# Patient Record
Sex: FEMALE | Race: WHITE | NOT HISPANIC OR LATINO | URBAN - METROPOLITAN AREA
[De-identification: names, ages, dates, MRNs, and addresses within clinical notes are randomized per-mention and may not be internally consistent; named-entity substitution may affect disease eponyms.]

---

## 2022-08-26 ENCOUNTER — EMERGENCY (EMERGENCY)
Facility: HOSPITAL | Age: 82
LOS: 1 days | Discharge: HOME | End: 2022-08-26
Attending: INTERNAL MEDICINE | Admitting: INTERNAL MEDICINE
Payer: MEDICARE

## 2022-08-26 ENCOUNTER — APPOINTMENT (OUTPATIENT)
Dept: INTERNAL MEDICINE | Facility: CLINIC | Age: 82
End: 2022-08-26

## 2022-08-26 VITALS
OXYGEN SATURATION: 100 % | HEART RATE: 89 BPM | DIASTOLIC BLOOD PRESSURE: 77 MMHG | WEIGHT: 110.89 LBS | HEIGHT: 62 IN | RESPIRATION RATE: 16 BRPM | SYSTOLIC BLOOD PRESSURE: 158 MMHG | TEMPERATURE: 98 F

## 2022-08-26 VITALS
OXYGEN SATURATION: 99 % | RESPIRATION RATE: 16 BRPM | BODY MASS INDEX: 21.99 KG/M2 | HEIGHT: 60 IN | DIASTOLIC BLOOD PRESSURE: 68 MMHG | WEIGHT: 112 LBS | TEMPERATURE: 98 F | HEART RATE: 88 BPM | SYSTOLIC BLOOD PRESSURE: 124 MMHG

## 2022-08-26 VITALS
RESPIRATION RATE: 18 BRPM | HEART RATE: 78 BPM | DIASTOLIC BLOOD PRESSURE: 73 MMHG | OXYGEN SATURATION: 97 % | SYSTOLIC BLOOD PRESSURE: 128 MMHG

## 2022-08-26 DIAGNOSIS — U07.1 COVID-19: ICD-10-CM

## 2022-08-26 DIAGNOSIS — M54.12 RADICULOPATHY, CERVICAL REGION: ICD-10-CM

## 2022-08-26 LAB
ALBUMIN SERPL ELPH-MCNC: 3.3 G/DL — SIGNIFICANT CHANGE UP (ref 3.3–5)
ALP SERPL-CCNC: 67 U/L — SIGNIFICANT CHANGE UP (ref 40–120)
ALT FLD-CCNC: 18 U/L — SIGNIFICANT CHANGE UP (ref 10–45)
ANION GAP SERPL CALC-SCNC: 10 MMOL/L — SIGNIFICANT CHANGE UP (ref 5–17)
AST SERPL-CCNC: 16 U/L — SIGNIFICANT CHANGE UP (ref 10–40)
BASOPHILS # BLD AUTO: 0.04 K/UL — SIGNIFICANT CHANGE UP (ref 0–0.2)
BASOPHILS NFR BLD AUTO: 0.8 % — SIGNIFICANT CHANGE UP (ref 0–2)
BILIRUB SERPL-MCNC: 0.2 MG/DL — SIGNIFICANT CHANGE UP (ref 0.2–1.2)
BUN SERPL-MCNC: 30 MG/DL — HIGH (ref 7–23)
CALCIUM SERPL-MCNC: 10.3 MG/DL — SIGNIFICANT CHANGE UP (ref 8.4–10.5)
CHLORIDE SERPL-SCNC: 107 MMOL/L — SIGNIFICANT CHANGE UP (ref 96–108)
CO2 SERPL-SCNC: 25 MMOL/L — SIGNIFICANT CHANGE UP (ref 22–31)
CREAT SERPL-MCNC: 0.9 MG/DL — SIGNIFICANT CHANGE UP (ref 0.5–1.3)
EGFR: 64 ML/MIN/1.73M2 — SIGNIFICANT CHANGE UP
EOSINOPHIL # BLD AUTO: 0.06 K/UL — SIGNIFICANT CHANGE UP (ref 0–0.5)
EOSINOPHIL NFR BLD AUTO: 1.2 % — SIGNIFICANT CHANGE UP (ref 0–6)
GLUCOSE SERPL-MCNC: 113 MG/DL — HIGH (ref 70–99)
HCT VFR BLD CALC: 33.1 % — LOW (ref 34.5–45)
HGB BLD-MCNC: 10.7 G/DL — LOW (ref 11.5–15.5)
IMM GRANULOCYTES NFR BLD AUTO: 0.2 % — SIGNIFICANT CHANGE UP (ref 0–1.5)
LIDOCAIN IGE QN: 91 U/L — SIGNIFICANT CHANGE UP (ref 73–393)
LYMPHOCYTES # BLD AUTO: 0.9 K/UL — LOW (ref 1–3.3)
LYMPHOCYTES # BLD AUTO: 18.7 % — SIGNIFICANT CHANGE UP (ref 13–44)
MCHC RBC-ENTMCNC: 29.9 PG — SIGNIFICANT CHANGE UP (ref 27–34)
MCHC RBC-ENTMCNC: 32.3 GM/DL — SIGNIFICANT CHANGE UP (ref 32–36)
MCV RBC AUTO: 92.5 FL — SIGNIFICANT CHANGE UP (ref 80–100)
MONOCYTES # BLD AUTO: 0.53 K/UL — SIGNIFICANT CHANGE UP (ref 0–0.9)
MONOCYTES NFR BLD AUTO: 11 % — SIGNIFICANT CHANGE UP (ref 2–14)
NEUTROPHILS # BLD AUTO: 3.27 K/UL — SIGNIFICANT CHANGE UP (ref 1.8–7.4)
NEUTROPHILS NFR BLD AUTO: 68.1 % — SIGNIFICANT CHANGE UP (ref 43–77)
NRBC # BLD: 0 /100 WBCS — SIGNIFICANT CHANGE UP (ref 0–0)
PLATELET # BLD AUTO: 250 K/UL — SIGNIFICANT CHANGE UP (ref 150–400)
POTASSIUM SERPL-MCNC: 3.9 MMOL/L — SIGNIFICANT CHANGE UP (ref 3.5–5.3)
POTASSIUM SERPL-SCNC: 3.9 MMOL/L — SIGNIFICANT CHANGE UP (ref 3.5–5.3)
PROT SERPL-MCNC: 7.7 G/DL — SIGNIFICANT CHANGE UP (ref 6–8.3)
RBC # BLD: 3.58 M/UL — LOW (ref 3.8–5.2)
RBC # FLD: 12.3 % — SIGNIFICANT CHANGE UP (ref 10.3–14.5)
SARS-COV-2 RNA SPEC QL NAA+PROBE: SIGNIFICANT CHANGE UP
SODIUM SERPL-SCNC: 142 MMOL/L — SIGNIFICANT CHANGE UP (ref 135–145)
TROPONIN I, HIGH SENSITIVITY RESULT: 11.5 NG/L — SIGNIFICANT CHANGE UP
TROPONIN I, HIGH SENSITIVITY RESULT: 12.4 NG/L — SIGNIFICANT CHANGE UP
WBC # BLD: 4.81 K/UL — SIGNIFICANT CHANGE UP (ref 3.8–10.5)
WBC # FLD AUTO: 4.81 K/UL — SIGNIFICANT CHANGE UP (ref 3.8–10.5)

## 2022-08-26 PROCEDURE — 83690 ASSAY OF LIPASE: CPT

## 2022-08-26 PROCEDURE — 76705 ECHO EXAM OF ABDOMEN: CPT

## 2022-08-26 PROCEDURE — 76705 ECHO EXAM OF ABDOMEN: CPT | Mod: 26

## 2022-08-26 PROCEDURE — 84484 ASSAY OF TROPONIN QUANT: CPT

## 2022-08-26 PROCEDURE — 74177 CT ABD & PELVIS W/CONTRAST: CPT | Mod: 26,MA

## 2022-08-26 PROCEDURE — 71045 X-RAY EXAM CHEST 1 VIEW: CPT | Mod: 26

## 2022-08-26 PROCEDURE — 99204 OFFICE O/P NEW MOD 45 MIN: CPT | Mod: 25

## 2022-08-26 PROCEDURE — 36415 COLL VENOUS BLD VENIPUNCTURE: CPT

## 2022-08-26 PROCEDURE — 93010 ELECTROCARDIOGRAM REPORT: CPT

## 2022-08-26 PROCEDURE — 74177 CT ABD & PELVIS W/CONTRAST: CPT | Mod: MA

## 2022-08-26 PROCEDURE — 85025 COMPLETE CBC W/AUTO DIFF WBC: CPT

## 2022-08-26 PROCEDURE — 93005 ELECTROCARDIOGRAM TRACING: CPT

## 2022-08-26 PROCEDURE — 96365 THER/PROPH/DIAG IV INF INIT: CPT | Mod: XU

## 2022-08-26 PROCEDURE — 80053 COMPREHEN METABOLIC PANEL: CPT

## 2022-08-26 PROCEDURE — 71045 X-RAY EXAM CHEST 1 VIEW: CPT

## 2022-08-26 PROCEDURE — 99285 EMERGENCY DEPT VISIT HI MDM: CPT | Mod: 25

## 2022-08-26 PROCEDURE — 96375 TX/PRO/DX INJ NEW DRUG ADDON: CPT

## 2022-08-26 PROCEDURE — 87635 SARS-COV-2 COVID-19 AMP PRB: CPT

## 2022-08-26 PROCEDURE — 99285 EMERGENCY DEPT VISIT HI MDM: CPT

## 2022-08-26 PROCEDURE — 96361 HYDRATE IV INFUSION ADD-ON: CPT

## 2022-08-26 RX ORDER — CIPROFLOXACIN LACTATE 400MG/40ML
400 VIAL (ML) INTRAVENOUS ONCE
Refills: 0 | Status: COMPLETED | OUTPATIENT
Start: 2022-08-26 | End: 2022-08-26

## 2022-08-26 RX ORDER — MORPHINE SULFATE 50 MG/1
4 CAPSULE, EXTENDED RELEASE ORAL ONCE
Refills: 0 | Status: DISCONTINUED | OUTPATIENT
Start: 2022-08-26 | End: 2022-08-26

## 2022-08-26 RX ORDER — SODIUM CHLORIDE 9 MG/ML
1000 INJECTION INTRAMUSCULAR; INTRAVENOUS; SUBCUTANEOUS ONCE
Refills: 0 | Status: COMPLETED | OUTPATIENT
Start: 2022-08-26 | End: 2022-08-26

## 2022-08-26 RX ORDER — ONDANSETRON 8 MG/1
4 TABLET, FILM COATED ORAL ONCE
Refills: 0 | Status: COMPLETED | OUTPATIENT
Start: 2022-08-26 | End: 2022-08-26

## 2022-08-26 RX ORDER — ONDANSETRON 8 MG/1
1 TABLET, FILM COATED ORAL
Qty: 21 | Refills: 0
Start: 2022-08-26 | End: 2022-09-01

## 2022-08-26 RX ORDER — METRONIDAZOLE 500 MG
500 TABLET ORAL ONCE
Refills: 0 | Status: DISCONTINUED | OUTPATIENT
Start: 2022-08-26 | End: 2022-08-26

## 2022-08-26 RX ADMIN — SODIUM CHLORIDE 1000 MILLILITER(S): 9 INJECTION INTRAMUSCULAR; INTRAVENOUS; SUBCUTANEOUS at 17:35

## 2022-08-26 RX ADMIN — Medication 400 MILLIGRAM(S): at 20:17

## 2022-08-26 RX ADMIN — Medication 200 MILLIGRAM(S): at 19:17

## 2022-08-26 RX ADMIN — SODIUM CHLORIDE 1000 MILLILITER(S): 9 INJECTION INTRAMUSCULAR; INTRAVENOUS; SUBCUTANEOUS at 16:35

## 2022-08-26 RX ADMIN — ONDANSETRON 4 MILLIGRAM(S): 8 TABLET, FILM COATED ORAL at 16:35

## 2022-08-26 NOTE — ED PROVIDER NOTE - PROGRESS NOTE DETAILS
case d/w dr martinez recommend no emergent surgical issue , dr camp informed ct ordered to further evaluate JESSICA Tran: Pt s/o to me to f/u abd CT results-- results d/w radiologist, suspicious for lung cancer with mets. I spoke with PCP Dr. Lynch regarding the results- he will f/u with patient in 1 week and refer her to the appropriate specialist. Pt and her  were informed of the results and the importance to f/u with Dr. Lynch for an outpatient w/u for malignancy. They verbalized understanding. No indication for abx

## 2022-08-26 NOTE — ED ADULT NURSE NOTE - OBJECTIVE STATEMENT
Pt came from home with c/o RUQ pain for a few weeks. Pt states that the RUQ pain is 3/10 and is associated with nausea. Pt states that she was sent in by MD Samaniego for a CT abdomen. Pt with hx HTN and HLD. Pt denies any fevers, chills, sob or chest pain at this time. Pt came from home with c/o RUQ pain for a few weeks. Pt states that the RUQ pain is 6/10 and is associated with nausea. Pt states that she has tried taking carafate, protonix and pepcid with no relief. Pt states that she was sent in by MD Samaniego for a CT abdomen. Pt with hx HTN and HLD. Pt denies any fevers, chills, sob or chest pain at this time.

## 2022-08-26 NOTE — ED ADULT TRIAGE NOTE - WEIGHT IN LBS
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 endometrium appears markedly thickened\par 4) EMB 8/28/2019\par    a) Rare foci of complex atypical hyperplasia in a background of exogenous hormone-related change.\par 5) EMB 11/22/2019 \par    a) Endometrium with foci of complex atypical hyperplasia in a background of exogenous hormone related changes. \par 6) EMB 2/27/2020\par     a) CAH\par 7) Pelvic US 7/7/20\par     a) uterus 8.4cm\par     b) endometrium 0.8cm , prominent echogenicity along its superior extent\par     c) IUD low in position \par \par \par patient is no longer bleeding.  feels fine.  Has been seeing an endocrinologist   She has gained a lot of weight.  She is taking megace.   110.8

## 2022-08-26 NOTE — ED PROVIDER NOTE - PATIENT PORTAL LINK FT
You can access the FollowMyHealth Patient Portal offered by [facility] by registering at the following website: [email or web address]. By joining [employer]’s FollowMyHealth portal, you will also be able to view your health information using other applications (apps) compatible with our system.

## 2022-08-26 NOTE — ED PROVIDER NOTE - CARE PROVIDERS DIRECT ADDRESSES
[email or web address]

## 2022-08-26 NOTE — ED PROVIDER NOTE - SKIN NEGATIVE STATEMENT, MLM
Afib    Anemia    CKD (chronic kidney disease)    Dementia    Depression    DM (diabetes mellitus), secondary    Dyslipidemia    HTN (hypertension) no abrasions, no jaundice, no lesions, no pruritis, and no rashes.

## 2022-08-26 NOTE — ED PROVIDER NOTE - CARE PROVIDER_API CALL
Chuck Samaniego  GASTROENTEROLOGY  [address] [place] [address], [address]  [place], [place] [id number]  Phone: [phone]  Fax: [phone]  Follow Up Time:

## 2022-08-26 NOTE — ED PROVIDER NOTE - ATTENDING APP SHARED VISIT CONTRIBUTION OF CARE
80 y/o F with h/o RA, HTN, HLD pw waxing and waning RUQ abdominal pain for a few weeks associated with nausea. Denies fever, chills, headache, chest pain, shortness of breath, vomiting, diarrhea, dysuria, weakness, numbness, tingling. Currently on carafate, pepcid and protonix without relief.   PMD- Danette  NO smoking, no ETOH  Plan: Will check basic labs with lipase, RUQ US, UA, UCX, COVID, give fluids/zofran/pepcid and reassess  Dr. Cao:  I have reviewed and discussed with the PA/ resident the case specifics, including the history, physical assessment, evaluation, conclusion, laboratory results, and medical plan. I agree with the contents, and conclusions. I have personally examined, and interviewed the patient. 82 y/o F with h/o RA, HTN, HLD pw waxing and waning RUQ abdominal pain for a few weeks associated with nausea. Denies fever, chills, headache, chest pain, shortness of breath, vomiting, diarrhea, dysuria, weakness, numbness, tingling. Currently on carafate, pepcid and protonix without relief.   PMD- Danette  NO smoking, no ETOH  Plan: Will check basic labs with lipase, RUQ US, UA, UCX, COVID, give fluids/zofran/pepcid and reassess  case was s/o dr prabhakar follow up ct , reeval and dispo  Dr. Cao:  I have reviewed and discussed with the PA/ resident the case specifics, including the history, physical assessment, evaluation, conclusion, laboratory results, and medical plan. I agree with the contents, and conclusions. I have personally examined, and interviewed the patient.

## 2022-08-26 NOTE — ED PROVIDER NOTE - CLINICAL SUMMARY MEDICAL DECISION MAKING FREE TEXT BOX
80 y/o F with h/o RA, HTN, HLD pw waxing and waning RUQ abdominal pain for a few weeks associated with nausea. Denies fever, chills, headache, chest pain, shortness of breath, vomiting, diarrhea, dysuria, weakness, numbness, tingling. Currently on carafate, pepcid and protonix without relief.   PMD- Danette  NO smoking, no ETOH  Plan: Will check basic labs with lipase, RUQ US, UA, UCX, COVID, give fluids/zofran/pepcid and reassess

## 2022-08-26 NOTE — ED PROVIDER NOTE - NSFOLLOWUPINSTRUCTIONS_ED_ALL_ED_FT
Follow up with your primary care physician Dr. Lynch within 1 week as discussed for the abnormal abdominal catscan. Take the copy of your test results you were given in the emergency room for your doctor to review.     Take the prescribed medication as directed for nausea     Stay hydrated    Return to the ER if your symptoms worsen or for any other medical emergencies  ***********

## 2022-08-26 NOTE — ED PROVIDER NOTE - OBJECTIVE STATEMENT
Pantoprazole 40mg PO daily     80 y/o F with h/o RA, HTN, HLD pw waxing and waning RUQ abdominal pain for a few weeks associated with nausea. Denies fever, chills, headache, chest pain, shortness of breath, vomiting, diarrhea, dysuria, weakness, numbness, tingling. Currently on carafate, pepcid and protonix without relief.   PMD- Danette  NO smoking, no ETOH

## 2022-08-26 NOTE — ED PROVIDER NOTE - NS ED ATTENDING STATEMENT MOD
This was a shared visit with the EDILMA. I reviewed and verified the documentation and independently performed the documented:

## 2022-08-29 ENCOUNTER — NON-APPOINTMENT (OUTPATIENT)
Age: 82
End: 2022-08-29

## 2022-08-30 ENCOUNTER — APPOINTMENT (OUTPATIENT)
Dept: CT IMAGING | Facility: IMAGING CENTER | Age: 82
End: 2022-08-30

## 2022-08-30 ENCOUNTER — RESULT REVIEW (OUTPATIENT)
Age: 82
End: 2022-08-30

## 2022-08-30 ENCOUNTER — OUTPATIENT (OUTPATIENT)
Dept: OUTPATIENT SERVICES | Facility: HOSPITAL | Age: 82
LOS: 1 days | End: 2022-08-30
Payer: MEDICARE

## 2022-08-30 ENCOUNTER — INPATIENT (INPATIENT)
Facility: HOSPITAL | Age: 82
LOS: 2 days | Discharge: HOME | End: 2022-09-02
Attending: INTERNAL MEDICINE | Admitting: INTERNAL MEDICINE
Payer: MEDICARE

## 2022-08-30 ENCOUNTER — APPOINTMENT (OUTPATIENT)
Dept: PULMONOLOGY | Facility: CLINIC | Age: 82
End: 2022-08-30

## 2022-08-30 VITALS
HEART RATE: 80 BPM | HEIGHT: 62 IN | OXYGEN SATURATION: 100 % | DIASTOLIC BLOOD PRESSURE: 67 MMHG | TEMPERATURE: 98 F | SYSTOLIC BLOOD PRESSURE: 137 MMHG | RESPIRATION RATE: 18 BRPM

## 2022-08-30 DIAGNOSIS — J18.1 LOBAR PNEUMONIA, UNSPECIFIED ORGANISM: ICD-10-CM

## 2022-08-30 DIAGNOSIS — Z01.812 ENCOUNTER FOR PREPROCEDURAL LABORATORY EXAMINATION: ICD-10-CM

## 2022-08-30 DIAGNOSIS — Z20.822 ENCOUNTER FOR PREPROCEDURAL LABORATORY EXAMINATION: ICD-10-CM

## 2022-08-30 PROCEDURE — 82565 ASSAY OF CREATININE: CPT

## 2022-08-30 PROCEDURE — 71275 CT ANGIOGRAPHY CHEST: CPT

## 2022-08-30 PROCEDURE — 87811 SARS-COV-2 COVID19 W/OPTIC: CPT

## 2022-08-30 PROCEDURE — 99285 EMERGENCY DEPT VISIT HI MDM: CPT

## 2022-08-30 PROCEDURE — 71275 CT ANGIOGRAPHY CHEST: CPT | Mod: 26

## 2022-08-30 NOTE — ED PROVIDER NOTE - ATTENDING CONTRIBUTION TO CARE
Patient is an 80 yo F with history of RA, HTN, high cholesterol sent in by Dr. Chiu for admission for outpatient CT that showed acute segmental PE in [place] and findings concerning for a neoplastic process with left lower lobe consolidation multistation thoracic lymphadenopathy, moderate left and small right pleural effusions with left pleural nodularity and question of L2 pathologic fracture. Patient was sent in with instructions to start heparin and admit to tele. IR was consulted by pcp and already arranged for a procedure in 2 days. Patient reports 3 weeks of left upper abdominal discomfort that led to outpatient imaging. She reports she had COVID in May. No fevers, chills, nausea, vomiting or chest pain.     VS noted  Gen. no acute distress, Non toxic   HEENT: EOMI, mmm  Lungs: CTAB/L no C/ W /R   CVS: RRR   Abd; Soft non tender, non distended   Ext: b/l pedal edema, no calf tenderness  Skin: no rash  Neuro AAOx3 non focal clear speech  a/p: acute PE and CT findings as described above, concerning for neoplastic process with associated pleural effusions. Plan for labs and admission. Will start heparin. Patient has CT imaging visible in PACS.   - Nieves CHRIS

## 2022-08-30 NOTE — ED ADULT NURSE NOTE - OBJECTIVE STATEMENT
Patient received to room trb. Patient complaining of ct scan showing PE and stating that patient needs to be admitted. . Patient denies chest pain sob, n/v Patient on exam is on the monitor. IVg placed awaitng for orders.

## 2022-08-30 NOTE — ED PROVIDER NOTE - OBJECTIVE STATEMENT
82yo F w/ history of RA, htn and HLD coming in for outpatient CT that showed acute segmental PE in [place] and findings concerning for a neoplastic process by Dr. Chiu. Patient initially reports 3 weeks of left upper abdominal pain x 3 weeks, which prompted the scan. Patient was + for COVID in may. Otherwise denies chest pain, nausea, vomiting or fevers.

## 2022-08-30 NOTE — ED ADULT TRIAGE NOTE - CHIEF COMPLAINT QUOTE
sent here by pulmonologist for "left sided pulmonary embolism." Denies CP, SOB, Dizziness Hx HTN, RA

## 2022-08-30 NOTE — CHART NOTE - NSCHARTNOTEFT_GEN_A_CORE
IP Note    80 y/o F with dyspnea, with findings of new left sided pleural effusion with pleural nodularity, thoracic adenopathy, and possible extrathoracic metastatic disease with CTA also demonstrating new PE and pericardial effusion. Patient admitted to hospital for diagnostic and therapeutic intervention. Case discussed with referring pulmonologist.     Plan for OR on Thursday pm with Flexible Bronchoscopy, EBUS and possible left thoracentesis.     Recommend -   - Admit to tele for new PE and pericardial effusion  - Start heparin infusion for PE  - Obtain TTE/2D echo to assess pericardial effusion prior to OR and obtain cardiology eval and clearance for general anesthesia for the procedure.   - NPO after midnight wednesday night.  - Page on call pulmonary fellow with questions.     Full consult in am.     Axel Dos Santos MD  Interventional Pulmonology

## 2022-08-30 NOTE — CHART NOTE - NSCHARTNOTEFT_GEN_A_CORE
SW placed call to patient to discuss and assist with follow up care.  Patient presented to ED on 8/26/22.  Patient did not answer - right to VM. As per HIE, patient is scheduled to complete CTA on 8/30/22 as ordered by PMD.

## 2022-08-30 NOTE — ED PROVIDER NOTE - NS ED ROS FT
General: denies fever, chills  HENT: denies nasal congestion, rhinorrhea  Eyes: denies visual changes, blurred vision  CV: denies chest pain, palpitations  Resp: denies difficulty breathing, cough  Abdominal: left upper abdominal pain  : denies urinary pain or discharge  MSK: denies muscle aches, leg swelling  Neuro: denies headaches, numbness, tingling  Skin: denies rashes, bruises

## 2022-08-30 NOTE — ED PROVIDER NOTE - PHYSICAL EXAMINATION
GENERAL: well appearing in no acute distress, non-toxic appearing  HEAD: normocephalic, atraumatic  HENT: airway intact, neck supple  EYES: normal conjunctiva, EOMI, PERRL  CARDIAC: regular rate and rhythm, normal S1S2, no appreciable murmurs, 2+ pulses in UE/LE b/l  PULM: normal breath sounds, clear to ascultation bilaterally, no rales, rhonchi, wheezing  GI: abdomen nondistended, soft, nontender, no guarding, rebound tenderness  NEURO: no focal motor or sensory deficits  MSK: no peripheral edema, no calf tenderness b/l  SKIN: well-perfused, extremities warm, no visible rashes

## 2022-08-30 NOTE — ED PROVIDER NOTE - CLINICAL SUMMARY MEDICAL DECISION MAKING FREE TEXT BOX
82yo F w/ history of RA, htn and HLD coming in for outpatient CT that showed acute segmental PE w/ concerns for neoplastic process. Will begin heparin and admit for further eval

## 2022-08-31 ENCOUNTER — TRANSCRIPTION ENCOUNTER (OUTPATIENT)
Age: 82
End: 2022-08-31

## 2022-08-31 DIAGNOSIS — E78.5 HYPERLIPIDEMIA, UNSPECIFIED: ICD-10-CM

## 2022-08-31 DIAGNOSIS — Z90.89 ACQUIRED ABSENCE OF OTHER ORGANS: Chronic | ICD-10-CM

## 2022-08-31 DIAGNOSIS — Z29.9 ENCOUNTER FOR PROPHYLACTIC MEASURES, UNSPECIFIED: ICD-10-CM

## 2022-08-31 DIAGNOSIS — I10 ESSENTIAL (PRIMARY) HYPERTENSION: ICD-10-CM

## 2022-08-31 DIAGNOSIS — M06.9 RHEUMATOID ARTHRITIS, UNSPECIFIED: ICD-10-CM

## 2022-08-31 DIAGNOSIS — I26.99 OTHER PULMONARY EMBOLISM WITHOUT ACUTE COR PULMONALE: ICD-10-CM

## 2022-08-31 DIAGNOSIS — Z91.89 OTHER SPECIFIED PERSONAL RISK FACTORS, NOT ELSEWHERE CLASSIFIED: ICD-10-CM

## 2022-08-31 LAB
ALBUMIN SERPL ELPH-MCNC: 3.9 G/DL — SIGNIFICANT CHANGE UP (ref 3.3–5)
ALP SERPL-CCNC: 67 U/L — SIGNIFICANT CHANGE UP (ref 40–120)
ALT FLD-CCNC: 13 U/L — SIGNIFICANT CHANGE UP (ref 4–33)
ANION GAP SERPL CALC-SCNC: 12 MMOL/L — SIGNIFICANT CHANGE UP (ref 7–14)
APTT BLD: 24 SEC — LOW (ref 27–36.3)
APTT BLD: 52 SEC — HIGH (ref 27–36.3)
APTT BLD: 71.4 SEC — HIGH (ref 27–36.3)
APTT BLD: 75.1 SEC — HIGH (ref 27–36.3)
AST SERPL-CCNC: 19 U/L — SIGNIFICANT CHANGE UP (ref 4–32)
BASOPHILS # BLD AUTO: 0.03 K/UL — SIGNIFICANT CHANGE UP (ref 0–0.2)
BASOPHILS NFR BLD AUTO: 0.7 % — SIGNIFICANT CHANGE UP (ref 0–2)
BILIRUB SERPL-MCNC: 0.2 MG/DL — SIGNIFICANT CHANGE UP (ref 0.2–1.2)
BLD GP AB SCN SERPL QL: NEGATIVE — SIGNIFICANT CHANGE UP
BUN SERPL-MCNC: 25 MG/DL — HIGH (ref 7–23)
CALCIUM SERPL-MCNC: 9.3 MG/DL — SIGNIFICANT CHANGE UP (ref 8.4–10.5)
CHLORIDE SERPL-SCNC: 102 MMOL/L — SIGNIFICANT CHANGE UP (ref 98–107)
CO2 SERPL-SCNC: 23 MMOL/L — SIGNIFICANT CHANGE UP (ref 22–31)
CREAT SERPL-MCNC: 0.95 MG/DL — SIGNIFICANT CHANGE UP (ref 0.5–1.3)
EGFR: 60 ML/MIN/1.73M2 — SIGNIFICANT CHANGE UP
EOSINOPHIL # BLD AUTO: 0.09 K/UL — SIGNIFICANT CHANGE UP (ref 0–0.5)
EOSINOPHIL NFR BLD AUTO: 2 % — SIGNIFICANT CHANGE UP (ref 0–6)
GLUCOSE SERPL-MCNC: 90 MG/DL — SIGNIFICANT CHANGE UP (ref 70–99)
HCT VFR BLD CALC: 33 % — LOW (ref 34.5–45)
HCT VFR BLD CALC: 35.3 % — SIGNIFICANT CHANGE UP (ref 34.5–45)
HGB BLD-MCNC: 10.4 G/DL — LOW (ref 11.5–15.5)
HGB BLD-MCNC: 11.1 G/DL — LOW (ref 11.5–15.5)
IANC: 2.95 K/UL — SIGNIFICANT CHANGE UP (ref 1.8–7.4)
IMM GRANULOCYTES NFR BLD AUTO: 0.2 % — SIGNIFICANT CHANGE UP (ref 0–1.5)
INR BLD: 1.04 RATIO — SIGNIFICANT CHANGE UP (ref 0.88–1.16)
LYMPHOCYTES # BLD AUTO: 1.04 K/UL — SIGNIFICANT CHANGE UP (ref 1–3.3)
LYMPHOCYTES # BLD AUTO: 22.6 % — SIGNIFICANT CHANGE UP (ref 13–44)
MAGNESIUM SERPL-MCNC: 2.1 MG/DL — SIGNIFICANT CHANGE UP (ref 1.6–2.6)
MCHC RBC-ENTMCNC: 29.6 PG — SIGNIFICANT CHANGE UP (ref 27–34)
MCHC RBC-ENTMCNC: 30 PG — SIGNIFICANT CHANGE UP (ref 27–34)
MCHC RBC-ENTMCNC: 31.4 GM/DL — LOW (ref 32–36)
MCHC RBC-ENTMCNC: 31.5 GM/DL — LOW (ref 32–36)
MCV RBC AUTO: 94.1 FL — SIGNIFICANT CHANGE UP (ref 80–100)
MCV RBC AUTO: 95.1 FL — SIGNIFICANT CHANGE UP (ref 80–100)
MONOCYTES # BLD AUTO: 0.49 K/UL — SIGNIFICANT CHANGE UP (ref 0–0.9)
MONOCYTES NFR BLD AUTO: 10.6 % — SIGNIFICANT CHANGE UP (ref 2–14)
NEUTROPHILS # BLD AUTO: 2.95 K/UL — SIGNIFICANT CHANGE UP (ref 1.8–7.4)
NEUTROPHILS NFR BLD AUTO: 63.9 % — SIGNIFICANT CHANGE UP (ref 43–77)
NRBC # BLD: 0 /100 WBCS — SIGNIFICANT CHANGE UP (ref 0–0)
NRBC # BLD: 0 /100 WBCS — SIGNIFICANT CHANGE UP (ref 0–0)
NRBC # FLD: 0 K/UL — SIGNIFICANT CHANGE UP (ref 0–0)
NRBC # FLD: 0.03 K/UL — HIGH (ref 0–0)
NT-PROBNP SERPL-SCNC: 478 PG/ML — HIGH
PLATELET # BLD AUTO: 212 K/UL — SIGNIFICANT CHANGE UP (ref 150–400)
PLATELET # BLD AUTO: 275 K/UL — SIGNIFICANT CHANGE UP (ref 150–400)
POTASSIUM SERPL-MCNC: 4 MMOL/L — SIGNIFICANT CHANGE UP (ref 3.5–5.3)
POTASSIUM SERPL-SCNC: 4 MMOL/L — SIGNIFICANT CHANGE UP (ref 3.5–5.3)
PROT SERPL-MCNC: 7.5 G/DL — SIGNIFICANT CHANGE UP (ref 6–8.3)
PROTHROM AB SERPL-ACNC: 12.1 SEC — SIGNIFICANT CHANGE UP (ref 10.5–13.4)
RBC # BLD: 3.47 M/UL — LOW (ref 3.8–5.2)
RBC # BLD: 3.75 M/UL — LOW (ref 3.8–5.2)
RBC # FLD: 12.3 % — SIGNIFICANT CHANGE UP (ref 10.3–14.5)
RBC # FLD: 12.4 % — SIGNIFICANT CHANGE UP (ref 10.3–14.5)
RH IG SCN BLD-IMP: POSITIVE — SIGNIFICANT CHANGE UP
SARS-COV-2 N GENE NPH QL NAA+PROBE: NOT DETECTED
SARS-COV-2 RNA SPEC QL NAA+PROBE: SIGNIFICANT CHANGE UP
SODIUM SERPL-SCNC: 137 MMOL/L — SIGNIFICANT CHANGE UP (ref 135–145)
TROPONIN T, HIGH SENSITIVITY RESULT: 24 NG/L — SIGNIFICANT CHANGE UP
WBC # BLD: 4.37 K/UL — SIGNIFICANT CHANGE UP (ref 3.8–10.5)
WBC # BLD: 4.61 K/UL — SIGNIFICANT CHANGE UP (ref 3.8–10.5)
WBC # FLD AUTO: 4.37 K/UL — SIGNIFICANT CHANGE UP (ref 3.8–10.5)
WBC # FLD AUTO: 4.61 K/UL — SIGNIFICANT CHANGE UP (ref 3.8–10.5)

## 2022-08-31 PROCEDURE — 99222 1ST HOSP IP/OBS MODERATE 55: CPT

## 2022-08-31 PROCEDURE — 99223 1ST HOSP IP/OBS HIGH 75: CPT

## 2022-08-31 PROCEDURE — 93306 TTE W/DOPPLER COMPLETE: CPT | Mod: 26

## 2022-08-31 PROCEDURE — 99233 SBSQ HOSP IP/OBS HIGH 50: CPT

## 2022-08-31 PROCEDURE — 99223 1ST HOSP IP/OBS HIGH 75: CPT | Mod: GC

## 2022-08-31 RX ORDER — HEPARIN SODIUM 5000 [USP'U]/ML
2000 INJECTION INTRAVENOUS; SUBCUTANEOUS EVERY 6 HOURS
Refills: 0 | Status: DISCONTINUED | OUTPATIENT
Start: 2022-08-31 | End: 2022-09-01

## 2022-08-31 RX ORDER — LANOLIN ALCOHOL/MO/W.PET/CERES
3 CREAM (GRAM) TOPICAL AT BEDTIME
Refills: 0 | Status: DISCONTINUED | OUTPATIENT
Start: 2022-08-31 | End: 2022-09-02

## 2022-08-31 RX ORDER — FOLIC ACID 0.8 MG
1 TABLET ORAL DAILY
Refills: 0 | Status: DISCONTINUED | OUTPATIENT
Start: 2022-08-31 | End: 2022-09-02

## 2022-08-31 RX ORDER — ONDANSETRON 8 MG/1
4 TABLET, FILM COATED ORAL ONCE
Refills: 0 | Status: COMPLETED | OUTPATIENT
Start: 2022-08-31 | End: 2022-08-31

## 2022-08-31 RX ORDER — HYDROXYCHLOROQUINE SULFATE 200 MG
200 TABLET ORAL DAILY
Refills: 0 | Status: DISCONTINUED | OUTPATIENT
Start: 2022-08-31 | End: 2022-09-02

## 2022-08-31 RX ORDER — HEPARIN SODIUM 5000 [USP'U]/ML
2000 INJECTION INTRAVENOUS; SUBCUTANEOUS EVERY 6 HOURS
Refills: 0 | Status: DISCONTINUED | OUTPATIENT
Start: 2022-08-31 | End: 2022-08-31

## 2022-08-31 RX ORDER — HEPARIN SODIUM 5000 [USP'U]/ML
INJECTION INTRAVENOUS; SUBCUTANEOUS
Qty: 25000 | Refills: 0 | Status: DISCONTINUED | OUTPATIENT
Start: 2022-08-31 | End: 2022-08-31

## 2022-08-31 RX ORDER — AMLODIPINE BESYLATE 2.5 MG/1
2.5 TABLET ORAL DAILY
Refills: 0 | Status: DISCONTINUED | OUTPATIENT
Start: 2022-08-31 | End: 2022-09-02

## 2022-08-31 RX ORDER — ACETAMINOPHEN 500 MG
650 TABLET ORAL EVERY 6 HOURS
Refills: 0 | Status: DISCONTINUED | OUTPATIENT
Start: 2022-08-31 | End: 2022-09-02

## 2022-08-31 RX ORDER — HEPARIN SODIUM 5000 [USP'U]/ML
1100 INJECTION INTRAVENOUS; SUBCUTANEOUS
Qty: 25000 | Refills: 0 | Status: DISCONTINUED | OUTPATIENT
Start: 2022-08-31 | End: 2022-09-01

## 2022-08-31 RX ORDER — HEPARIN SODIUM 5000 [USP'U]/ML
4000 INJECTION INTRAVENOUS; SUBCUTANEOUS EVERY 6 HOURS
Refills: 0 | Status: DISCONTINUED | OUTPATIENT
Start: 2022-08-31 | End: 2022-09-01

## 2022-08-31 RX ORDER — HEPARIN SODIUM 5000 [USP'U]/ML
4000 INJECTION INTRAVENOUS; SUBCUTANEOUS ONCE
Refills: 0 | Status: COMPLETED | OUTPATIENT
Start: 2022-08-31 | End: 2022-08-31

## 2022-08-31 RX ORDER — HEPARIN SODIUM 5000 [USP'U]/ML
4000 INJECTION INTRAVENOUS; SUBCUTANEOUS EVERY 6 HOURS
Refills: 0 | Status: DISCONTINUED | OUTPATIENT
Start: 2022-08-31 | End: 2022-08-31

## 2022-08-31 RX ORDER — SIMVASTATIN 20 MG/1
40 TABLET, FILM COATED ORAL AT BEDTIME
Refills: 0 | Status: DISCONTINUED | OUTPATIENT
Start: 2022-08-31 | End: 2022-09-02

## 2022-08-31 RX ADMIN — Medication 200 MILLIGRAM(S): at 11:55

## 2022-08-31 RX ADMIN — Medication 650 MILLIGRAM(S): at 18:57

## 2022-08-31 RX ADMIN — HEPARIN SODIUM 1100 UNIT(S)/HR: 5000 INJECTION INTRAVENOUS; SUBCUTANEOUS at 07:31

## 2022-08-31 RX ADMIN — Medication 650 MILLIGRAM(S): at 17:42

## 2022-08-31 RX ADMIN — HEPARIN SODIUM 1000 UNIT(S)/HR: 5000 INJECTION INTRAVENOUS; SUBCUTANEOUS at 00:48

## 2022-08-31 RX ADMIN — ONDANSETRON 4 MILLIGRAM(S): 8 TABLET, FILM COATED ORAL at 15:43

## 2022-08-31 RX ADMIN — HEPARIN SODIUM 2000 UNIT(S): 5000 INJECTION INTRAVENOUS; SUBCUTANEOUS at 07:31

## 2022-08-31 RX ADMIN — Medication 1 MILLIGRAM(S): at 11:56

## 2022-08-31 RX ADMIN — HEPARIN SODIUM 1100 UNIT(S)/HR: 5000 INJECTION INTRAVENOUS; SUBCUTANEOUS at 20:19

## 2022-08-31 RX ADMIN — HEPARIN SODIUM 1100 UNIT(S)/HR: 5000 INJECTION INTRAVENOUS; SUBCUTANEOUS at 22:22

## 2022-08-31 RX ADMIN — HEPARIN SODIUM 1100 UNIT(S)/HR: 5000 INJECTION INTRAVENOUS; SUBCUTANEOUS at 19:26

## 2022-08-31 RX ADMIN — AMLODIPINE BESYLATE 2.5 MILLIGRAM(S): 2.5 TABLET ORAL at 11:55

## 2022-08-31 RX ADMIN — HEPARIN SODIUM 4000 UNIT(S): 5000 INJECTION INTRAVENOUS; SUBCUTANEOUS at 00:48

## 2022-08-31 NOTE — H&P ADULT - GASTROINTESTINAL
details… normal/soft/nontender/nondistended/normal active bowel sounds/no guarding/no rigidity/no organomegaly/no palpable moisés/no masses palpable

## 2022-08-31 NOTE — H&P ADULT - NS ATTEND AMEND GEN_ALL_CORE FT
80yo F w/ history of RA, HTN, and HLD presenting after OP CT showed acute segmental PE in [place] with findings concerning for malignancy. Patient is a bit anxious due to findings, but doing okay.     #Acute segmental/subsegmental [place] PE  - HD stable, resting comfortably off oxygen   - hep ggt, monitor PTTs   - BNP mildly elevated to the 400s, trop negative   - echo ordered to assess for right heart strain   - hold ggt 6 am on 9/1 for procedure     #c/f malignancy   - pulm consult appreciated   - cardiology consult for risk strat prior to EBUS bronchoscopy, thoracentesis 09/01     #HTN / HLD  - c/w home amlodipine, Zocor      #RA   - continue Plaquenil     Moreno Thompson MD  [facility] Division of Hospital Medicine  Pager: [phone]  Available via MS Teams

## 2022-08-31 NOTE — H&P ADULT - RESPIRATORY
normal/clear to auscultation bilaterally/no wheezes/no rales/no rhonchi/no respiratory distress/no use of accessory muscles/no subcutaneous emphysema

## 2022-08-31 NOTE — ED ADULT NURSE REASSESSMENT NOTE - NS ED NURSE REASSESS COMMENT FT1
Aptt result noted to be 71.4 .. no change in titration needed per heparin nomogram full anticoagulation protocol. Heparin gtt remains at 11ml/hr. Rpt aptt @1900
Received report from ENEDINA Acosta. Pt presents AAOx4 resting in bed in non acute distress. Heparin gtt in progress @11ml/hr verified with ENEDINA Acosta. Rpt aptt sent @1314. Pt provided with food.  at bedside. Pt denies pain/discomfort. Respirations even and unlabored sating 100% on room air. NSR on cardiac monitor. Cardiology at bedside for eval. Admitted to tele awaiting bed assignment
pt alert and oriented. pt denies complaints at this time. pt has no signs of bleeding noticed. Heparin drip was titrated up to 11ml/hr as per full AC nomogram. Will continue to monitor.
pt heparin infusion began. pt explained to notify RN if noticed any signs of bleeding. pt denies complaints at this time. Family at bedside. Will continue to monitor.
pt received A&ox4, awake and alert, NSR on telemetry. pt denies chest pain and SOB. heparin gtt running at 11ml/hr. no signs of bleeding. bleeding precautions in place. breathing is spontaneous and unlabored.  at bedside, bed in lowest position, call bell within reach, will continue to monitor.
pt is sleeping in stretcher, no signs of bleeding noted. VSS. Will continue to monitor.

## 2022-08-31 NOTE — CONSULT NOTE ADULT - ASSESSMENT
82 yo F pmhx RA, HTN presenting with right upper quadrant pain found to have lymph adenopathy and pleural effusion. IP consulted for potential procedure. CT scan findings of PE and pericardial effusion as well    #Pulmonary Embolism  #Pleural Effusion  # Pleural Nodules  # Thoracic adenopathy    Recommendations:  - Heparin gtt for PE  - TTE for cardiac evaluation  - Plan for EBUS, bronchoscopy, thoracentesis tomorrow at 2pm   - Cardiology consult; needs cardiac clearance prior to procedure  - Hold anticoagulation at 6 am  - Check cbc, cmp, coags, type and screen, negative covid and NPO status prior to procedure  - Monitor on tele     case d/w Dr. Dos Santos  80 yo F pmhx RA, HTN presenting with right upper quadrant pain found to have lymph adenopathy and pleural effusion. IP consulted for diagnostic and therapeutic procedure. CT scan findings of PE and pericardial effusion as well    #Pulmonary Embolism  #Pleural Effusion  # Pleural Nodules  # Thoracic adenopathy    Recommendations:  - Heparin gtt for PE  - TTE for cardiac evaluation  - Plan for EBUS, bronchoscopy, thoracentesis tomorrow at 2pm   - Cardiology consult; needs cardiac clearance prior to procedure  - Hold anticoagulation at 6 am  - Check cbc, cmp, coags, type and screen, negative covid and NPO status prior to procedure  - Monitor on tele     case d/w Dr. Dos Santos

## 2022-08-31 NOTE — CONSULT NOTE ADULT - ASSESSMENT
82 y/o Female PMH HTN, HLD presented for further treatment and bronchoscopy after outpatient CT showed acute segmental PE, pericardial effusion and left lower lobe consolidation c/f neoplastic process. Cardiology consulted for evaluation and optimization prior to bronchoscopy tomorrow. Echo today showed no evidence of RHS.     #Pericardial Effusion   -Small circumferential pericardial effusion seen on echo 8/31/22  -JVD noted on exam   -No evidence of tamponade on EKG; patient is hemodynamically stable   -    #Pre-op evaluation   -Prior to 1 month ago patient was >4 METS  Cardiovascular Risk Stratification - RCRI =  (0 points).  1. History of ischemic heart disease: No  2. History of congestive heart failure: No  3. History of cerebrovascular disease (stroke or transient ischemic attack): No  4. History of diabetes requiring preoperative insulin use: No  5. Chronic kidney disease (creatinine > 2 mg/dL): No  6. Undergoing suprainguinal vascular, intraperitoneal, or intrathoracic surgery: No  0 predictors = 0.4%, 1 predictor = 0.9%, 2 predictors = 6.6%, =3 predictors = >11%    - Overall this patient is as 0.4% risk (for cardiac death, nonfatal myocardial infarction, and nonfatal cardiac arrest perioperatively for this low risk procedure).    Please note recommendations are not final until attested by attending.   Radha Arora  PGY-1 80 y/o Female PMH HTN, HLD presented for further treatment and bronchoscopy after outpatient CT showed acute segmental PE, pericardial effusion and left lower lobe consolidation c/f neoplastic process. Cardiology consulted for evaluation and optimization prior to bronchoscopy tomorrow. Echo today showed no evidence of RHS.     #Pericardial Effusion likely 2/2 neoplastic process   -Small circumferential pericardial effusion seen on echo 8/31/22  -JVD noted on exam   -No evidence of tamponade on EKG; patient is hemodynamically stable   -no further management at this time    #Pre-op evaluation   -Prior to 1 month ago patient was >4 METS  Cardiovascular Risk Stratification - RCRI =  0 points.  1. History of ischemic heart disease: No  2. History of congestive heart failure: No  3. History of cerebrovascular disease (stroke or transient ischemic attack): No  4. History of diabetes requiring preoperative insulin use: No  5. Chronic kidney disease (creatinine > 2 mg/dL): No  6. Undergoing suprainguinal vascular, intraperitoneal, or intrathoracic surgery: No  0 predictors = 0.4%, 1 predictor = 0.9%, 2 predictors = 6.6%, =3 predictors = >11%    - Overall this patient is as 0.4% risk (for cardiac death, nonfatal myocardial infarction, and nonfatal cardiac arrest perioperatively for this low risk procedure).  Patient is at low risk for this low risk procedure.   No further cardiac optimization at this time.     Please note recommendations are not final until attested by attending.   Radha Arora  PGY-1

## 2022-08-31 NOTE — H&P ADULT - HISTORY OF PRESENT ILLNESS
80yo F w/ history of RA, HTN, and HLD coming in for outpatient CT that showed acute segmental PE in [place], pericardial effusion and findings concerning for a neoplastic process by Dr. Chiu. Patient c/o left upper abdominal pain with findings of new left sided pleural effusion, pleural nodularity, thoracic adenopathy, and possible extrathoracic metastatic disease. Patient admitted to hospital for diagnostic and therapeutic intervention. Patient complaints of left upper/epigastric abdominal discomfort, decrease appetite, general weakness , and weight loss ( 8 lbs in 3 months).No chest pain, SOB at rest, ESCALANTE, PND, orthopnea, palpitations, diaphoresis, lightheadedness, dizziness, syncope, increased lower extremity edema, fever chills, malaise, myalgias, , night sweats,  abdominal pain, N/V/C/D BRBPR, melena, urinary symptoms, cough, and wheezing.

## 2022-08-31 NOTE — CONSULT NOTE ADULT - ATTENDING COMMENTS
No acute cardiac pathology. No active cardiac conditions.  May safely proceed with planned procedure.
Agree with above. Patient seen and examined in the emergency room. Case discussed with pulmonary team. Imaging reviewed. Concerning for underlying malignancy, will plan for diagnostic bronchoscopy, ebus guided terrie sampling, possible left sided thoracentesis. Recommend TTE and cardiology clearance for OR tomorrow given new pulmonary embolism and pericardial effusion. NPO after midnight. Hold AC starting at 6 am for the procedure tomorrow. High risk of decompensation. Call with questions.

## 2022-08-31 NOTE — CONSULT NOTE ADULT - SUBJECTIVE AND OBJECTIVE BOX
HPI:  80yo F w/ history of RA, HTN, and HLD coming in for outpatient CT that showed acute segmental PE in SANDOVAL, pericardial effusion and findings concerning for a neoplastic process by Dr. Chiu. Patient c/o left upper abdominal pain with findings of new left sided pleural effusion, pleural nodularity, thoracic adenopathy, and possible extrathoracic metastatic disease. Patient admitted to hospital for diagnostic and therapeutic intervention. Patient complaints of left upper/epigastric abdominal discomfort, decrease appetite, general weakness , and weight loss ( 8 lbs in 3 months).No chest pain, SOB at rest, ESCALANTE, PND, orthopnea, palpitations, diaphoresis, lightheadedness, dizziness, syncope, increased lower extremity edema, fever chills, malaise, myalgias, , night sweats,  abdominal pain, N/V/C/D BRBPR, melena, urinary symptoms, cough, and wheezing.     Patient seen and examined at bedside. Admitted to [facility] at night. NO fevers, chills, no shortness of breath. Oxygen saturations have been okay on room air. Does have some lower extremity swelling that is chronic for her after surgery.         PAST MEDICAL & SURGICAL HISTORY:  HTN (hypertension)      Rheumatoid arthritis      Hyperlipidemia      S/P tonsillectomy          FAMILY HISTORY:  No pertinent family history in first degree relatives        SOCIAL HISTORY:  Smoking:   EtOH Use:  Marital Status:  Occupation:  Exposures:  Recent Travel:    Allergies    No Known Allergies    Intolerances        HOME MEDICATIONS:    REVIEW OF SYSTEMS:  Constitutional: No fevers or chills. No weight loss. No fatigue or generalised malaise.  Eyes: No itching or discharge from the eyes  ENT: No ear pain. No ear discharge. No nasal congestion. No post nasal drip. No epistaxis. No throat pain. No sore throat. No difficulty swallowing.   CV: No chest pain. No palpitations. No lightheadedness or dizziness.   Resp: No dyspnea at rest. No dyspnea on exertion. No orthopnea. No wheezing. No cough. No stridor. No sputum production. No chest pain with respiration.  GI: No nausea. No vomiting. No diarrhea.  MSK: No joint pain or pain in any extremities  Integumentary: No skin lesions. No pedal edema.  Neurological: No gross motor weakness. No sensory changes.    [ ] All other systems negative  [ ] Unable to assess ROS because ________    OBJECTIVE:  ICU Vital Signs Last 24 Hrs  T(C): 36.7 (31 Aug 2022 07:40), Max: 36.7 (30 Aug 2022 21:14)  T(F): 98 (31 Aug 2022 07:40), Max: 98 (30 Aug 2022 21:14)  HR: 71 (31 Aug 2022 07:40) (71 - 88)  BP: 145/75 (31 Aug 2022 07:40) (128/76 - 156/88)  BP(mean): --  ABP: --  ABP(mean): --  RR: 24 (31 Aug 2022 07:40) (18 - 25)  SpO2: 96% (31 Aug 2022 07:40) (96% - 100%)    O2 Parameters below as of 31 Aug 2022 07:40  Patient On (Oxygen Delivery Method): room air              CAPILLARY BLOOD GLUCOSE          PHYSICAL EXAM:  General: Awake, alert, oriented X 3.   HEENT: Atraumatic, normocephalic.                  No tonsillar or pharyngeal exudates.  Lymph Nodes: No palpable lymphadenopathy  Neck: No JVD. No carotid bruit.   Respiratory: normal breath sounds b/l  Cardiovascular: S1 S2 normal. No murmurs, rubs or gallops.   Abdomen: Soft, non-tender, non-distended. No organomegaly.  Extremities: Warm to touch. Peripheral pulse palpable. No pedal edema. Mild swelling.   Skin: No rashes or skin lesions  Neurological: Non-focal  Psychiatry: Appropriate mood and affect.    HOSPITAL MEDICATIONS:  MEDICATIONS  (STANDING):  amLODIPine   Tablet 2.5 milliGRAM(s) Oral daily  folic acid 1 milliGRAM(s) Oral daily  heparin  Infusion.  Unit(s)/Hr (10 mL/Hr) IV Continuous <Continuous>  hydroxychloroquine 200 milliGRAM(s) Oral daily  simvastatin 40 milliGRAM(s) Oral at bedtime    MEDICATIONS  (PRN):  acetaminophen     Tablet .. 650 milliGRAM(s) Oral every 6 hours PRN Temp greater or equal to 38C (100.4F), Mild Pain (1 - 3)  heparin   Injectable 4000 Unit(s) IV Push every 6 hours PRN For aPTT less than 40  heparin   Injectable 2000 Unit(s) IV Push every 6 hours PRN For aPTT between 40 - 57  melatonin 3 milliGRAM(s) Oral at bedtime PRN Insomnia      LABS:                        10.4   4.37  )-----------( 212      ( 31 Aug 2022 07:07 )             33.0     08-30    137  |  102  |  25<H>  ----------------------------<  90  4.0   |  23  |  0.95    Ca    9.3      30 Aug 2022 23:27  Mg     2.10     08-30    TPro  7.5  /  Alb  3.9  /  TBili  0.2  /  DBili  x   /  AST  19  /  ALT  13  /  AlkPhos  67  08-30    PT/INR - ( 30 Aug 2022 23:27 )   PT: 12.1 sec;   INR: 1.04 ratio         PTT - ( 31 Aug 2022 07:07 )  PTT:52.0 sec          MICROBIOLOGY:     RADIOLOGY:  [ ] Reviewed and interpreted by me    Point of Care Ultrasound Findings;    PFT:  < from: CT Angio Chest PE Protocol w/ IV Cont (08.30.22 @ 18:06) >  IMPRESSION:  Acute segmental/subsegmental pulmonary embolism in the anterior left   upper lobe. No imaging evidence of right heart strain.    Findings concerning for a neoplastic process (suspect lung primary) with   left lower lobe consolidation, multistation thoracic lymphadenopathy,   moderate left and small right pleural effusions with left pleural   nodularity and question of L2 pathologic fracture; details above.    Small pericardial effusion.    Findings were discussed with Dr. Chiu at 6:25pm on 8/30/2022.    --- End of Report ---    < end of copied text >        EKG:
HPI: 80 y/o Female PMH RA, HTN, HLD presented for a bronchoscopy after an outpatient CT chest showed acute segmental PE in SANDOVAL, pericardial effusion and findings c/f neoplastic process. Cardiology consulted for cardiac evaluation and optimization prior to bronchoscopy tomorrow.     Patient described that prior to about 1 month ago, she was fairly independent, completing her own household chores, grocery shopping.   For the past one month she has felt increasingly weak and unable to continue with her usual activities as before.     SUBJECTIVE: Patient seen and examined at bedside. Patient denies fevers, chills, CP, palpitations, headache, SOB while lying down. Patient does report she feels "chest congestion" and SOB when she exerts herself.     OBJECTIVE:    VITAL SIGNS:  ICU Vital Signs Last 24 Hrs  T(C): 36.7 (31 Aug 2022 07:40), Max: 36.7 (30 Aug 2022 21:14)  T(F): 98 (31 Aug 2022 07:40), Max: 98 (30 Aug 2022 21:14)  HR: 72 (31 Aug 2022 11:40) (64 - 88)  BP: 129/90 (31 Aug 2022 11:40) (122/87 - 156/88)  BP(mean): --  ABP: --  ABP(mean): --  RR: 16 (31 Aug 2022 11:40) (16 - 25)  SpO2: 96% (31 Aug 2022 11:40) (96% - 100%)    O2 Parameters below as of 31 Aug 2022 11:40  Patient On (Oxygen Delivery Method): room air              CAPILLARY BLOOD GLUCOSE          PHYSICAL EXAM:    General: Thin, elderly.   HEENT: NC/AT; PERRL, clear conjunctiva  Neck: supple; JVD Noted   Respiratory: Rhonchorus breath sounds bilaterally, with diminished breath sounds at bases, worse on left side  Cardiovascular: +S1/S2; RRR; possible third heart sound  Abdomen: soft, NT/ND; +BS x4  Extremities:  1 + pitting edema   Vascular: WWP, 2+ peripheral pulses b/l;  Skin: normal color and turgor; no rash  Neurological: A&Ox3, move all extremities. CN II-XII intact    MEDICATIONS:  MEDICATIONS  (STANDING):  amLODIPine   Tablet 2.5 milliGRAM(s) Oral daily  folic acid 1 milliGRAM(s) Oral daily  heparin  Infusion.  Unit(s)/Hr (10 mL/Hr) IV Continuous <Continuous>  hydroxychloroquine 200 milliGRAM(s) Oral daily  simvastatin 40 milliGRAM(s) Oral at bedtime    MEDICATIONS  (PRN):  acetaminophen     Tablet .. 650 milliGRAM(s) Oral every 6 hours PRN Temp greater or equal to 38C (100.4F), Mild Pain (1 - 3)  heparin   Injectable 4000 Unit(s) IV Push every 6 hours PRN For aPTT less than 40  heparin   Injectable 2000 Unit(s) IV Push every 6 hours PRN For aPTT between 40 - 57  melatonin 3 milliGRAM(s) Oral at bedtime PRN Insomnia      ALLERGIES:  Allergies    No Known Allergies    Intolerances        LABS:                        10.4   4.37  )-----------( 212      ( 31 Aug 2022 07:07 )             33.0     08-30    137  |  102  |  25<H>  ----------------------------<  90  4.0   |  23  |  0.95    Ca    9.3      30 Aug 2022 23:27  Mg     2.10     08-30    TPro  7.5  /  Alb  3.9  /  TBili  0.2  /  DBili  x   /  AST  19  /  ALT  13  /  AlkPhos  67  08-30    PT/INR - ( 30 Aug 2022 23:27 )   PT: 12.1 sec;   INR: 1.04 ratio         PTT - ( 31 Aug 2022 07:07 )  PTT:52.0 sec      RADIOLOGY & ADDITIONAL TESTS: Reviewed.  < from: CT Angio Chest PE Protocol w/ IV Cont (08.30.22 @ 18:06) >  IMPRESSION:  Acute segmental/subsegmental pulmonary embolism in the anterior left   upper lobe. No imaging evidence of right heart strain.    Findings concerning for a neoplastic process (suspect lung primary) with   left lower lobe consolidation, multistation thoracic lymphadenopathy,   moderate left and small right pleural effusions with left pleural   nodularity and question of L2 pathologic fracture; details above.    Small pericardial effusion.    < end of copied text >    < from: Transthoracic Echocardiogram (08.31.22 @ 11:02) >  CONCLUSIONS:  1. Mitral annular calcification, otherwise normal mitral  valve. Mild mitral regurgitation.  2. Calcified trileaflet aortic valve with decreased  opening. Peak transaortic valve gradient equals 22 mm Hg,  mean transaortic valve gradient equals 11 mm Hg, estimated  aortic valve area equals 1.8 sqcm (by continuity equation),  consistent with mild aortic stenosis. Mild aortic  regurgitation.  3. Normal left ventricular internal dimensions and wall  thicknesses.  4. Normal left ventricular systolic function. No segmental  wall motion abnormalities.  5. Normal right ventricular size and function.  6. Small circumferential pericardial effusion.    < end of copied text >

## 2022-08-31 NOTE — H&P ADULT - NEGATIVE GENERAL SYMPTOMS
no fever/no chills/no sweating/no weight gain/no polyphagia/no polyuria/no polydipsia/no malaise/no fatigue

## 2022-08-31 NOTE — H&P ADULT - PROBLEM SELECTOR PLAN 2
New left sided pleural effusion with pleural nodularity, thoracic hilar lymphadenopathy, w/ pericardial effusion and possible extrathoracic metastatic disease possible primary lung CA w/ mets  Pulmonary following: New left sided pleural effusion with pleural nodularity, thoracic adenopathy, and possible extrathoracic metastatic disease with CTA also demonstrating new PE and pericardial effusion. Patient admitted to hospital for diagnostic and therapeutic intervention. Case discussed with referring pulmonologist. Plan for OR on Thursday pm with Flexible Bronchoscopy, EBUS and possible left thoracentesis. Start heparin infusion for PE  CT chest: Findings concerning for a neoplastic process (suspect lung primary) with left lower lobe consolidation, multistation thoracic lymphadenopathy, moderate left and small right pleural effusions with left pleural nodularity and question of L2 pathologic fracture  - Obtain TTE/2D echo to assess pericardial effusion prior to OR and obtain cardiology eval and clearance for general anesthesia for the procedure.   - NPO after midnight wednesday night.

## 2022-08-31 NOTE — H&P ADULT - ASSESSMENT
82yo F w/ history of RA, HTN, and HLD coming in for outpatient CT that showed acute segmental PE in SANDOVAL, pericardial effusion and findings concerning for a neoplastic process by Dr. Chiu. Patient c/o left upper abdominal pain with findings of new left sided pleural effusion, pleural nodularity, thoracic adenopathy, and possible extrathoracic metastatic disease.

## 2022-08-31 NOTE — H&P ADULT - PROBLEM SELECTOR PLAN 1
Acute segmental/subsegmental SANDOVAL PE  Heparin gtt started   TTE ordered to eval R heart strain and pericardial effusion  Case to be discussed

## 2022-09-01 ENCOUNTER — TRANSCRIPTION ENCOUNTER (OUTPATIENT)
Age: 82
End: 2022-09-01

## 2022-09-01 ENCOUNTER — APPOINTMENT (OUTPATIENT)
Dept: PULMONOLOGY | Facility: HOSPITAL | Age: 82
End: 2022-09-01

## 2022-09-01 ENCOUNTER — RESULT REVIEW (OUTPATIENT)
Age: 82
End: 2022-09-01

## 2022-09-01 LAB
A1C WITH ESTIMATED AVERAGE GLUCOSE RESULT: 5.1 % — SIGNIFICANT CHANGE UP (ref 4–5.6)
ALBUMIN FLD-MCNC: 2.4 G/DL — SIGNIFICANT CHANGE UP
ANION GAP SERPL CALC-SCNC: 12 MMOL/L — SIGNIFICANT CHANGE UP (ref 7–14)
APTT BLD: 26.3 SEC — LOW (ref 27–36.3)
APTT BLD: 40.7 SEC — HIGH (ref 27–36.3)
B PERT IGG+IGM PNL SER: ABNORMAL
BUN SERPL-MCNC: 17 MG/DL — SIGNIFICANT CHANGE UP (ref 7–23)
CALCIUM SERPL-MCNC: 8.9 MG/DL — SIGNIFICANT CHANGE UP (ref 8.4–10.5)
CHLORIDE SERPL-SCNC: 104 MMOL/L — SIGNIFICANT CHANGE UP (ref 98–107)
CHOLEST FLD-MCNC: 58 MG/DL — SIGNIFICANT CHANGE UP
CHOLEST SERPL-MCNC: 144 MG/DL — SIGNIFICANT CHANGE UP
CO2 SERPL-SCNC: 22 MMOL/L — SIGNIFICANT CHANGE UP (ref 22–31)
COLOR FLD: YELLOW
CREAT SERPL-MCNC: 0.81 MG/DL — SIGNIFICANT CHANGE UP (ref 0.5–1.3)
EGFR: 73 ML/MIN/1.73M2 — SIGNIFICANT CHANGE UP
EOSINOPHIL # FLD: 0 % — SIGNIFICANT CHANGE UP
ESTIMATED AVERAGE GLUCOSE: 100 — SIGNIFICANT CHANGE UP
FLUID INTAKE SUBSTANCE CLASS: SIGNIFICANT CHANGE UP
FOLATE+VIT B12 SERBLD-IMP: 0 % — SIGNIFICANT CHANGE UP
GLUCOSE FLD-MCNC: 87 MG/DL — SIGNIFICANT CHANGE UP
GLUCOSE SERPL-MCNC: 88 MG/DL — SIGNIFICANT CHANGE UP (ref 70–99)
HCT VFR BLD CALC: 33.1 % — LOW (ref 34.5–45)
HDLC SERPL-MCNC: 55 MG/DL — SIGNIFICANT CHANGE UP
HGB BLD-MCNC: 10.4 G/DL — LOW (ref 11.5–15.5)
INR BLD: 1.08 RATIO — SIGNIFICANT CHANGE UP (ref 0.88–1.16)
LDH SERPL L TO P-CCNC: 577 U/L — SIGNIFICANT CHANGE UP
LIPID PNL WITH DIRECT LDL SERPL: 75 MG/DL — SIGNIFICANT CHANGE UP
LYMPHOCYTES # FLD: 40 % — SIGNIFICANT CHANGE UP
MAGNESIUM SERPL-MCNC: 2.1 MG/DL — SIGNIFICANT CHANGE UP (ref 1.6–2.6)
MCHC RBC-ENTMCNC: 29.2 PG — SIGNIFICANT CHANGE UP (ref 27–34)
MCHC RBC-ENTMCNC: 31.4 GM/DL — LOW (ref 32–36)
MCV RBC AUTO: 93 FL — SIGNIFICANT CHANGE UP (ref 80–100)
MESOTHL CELL # FLD: 0 % — SIGNIFICANT CHANGE UP
MONOS+MACROS # FLD: 5 % — SIGNIFICANT CHANGE UP
NEUTROPHILS-BODY FLUID: 35 % — SIGNIFICANT CHANGE UP
NON HDL CHOLESTEROL: 89 MG/DL — SIGNIFICANT CHANGE UP
NRBC # BLD: 0 /100 WBCS — SIGNIFICANT CHANGE UP (ref 0–0)
NRBC # FLD: 0 K/UL — SIGNIFICANT CHANGE UP (ref 0–0)
OTHER CELLS FLD MANUAL: 20 % — SIGNIFICANT CHANGE UP
PHOSPHATE SERPL-MCNC: 2.9 MG/DL — SIGNIFICANT CHANGE UP (ref 2.5–4.5)
PLATELET # BLD AUTO: 250 K/UL — SIGNIFICANT CHANGE UP (ref 150–400)
POTASSIUM SERPL-MCNC: 3.9 MMOL/L — SIGNIFICANT CHANGE UP (ref 3.5–5.3)
POTASSIUM SERPL-SCNC: 3.9 MMOL/L — SIGNIFICANT CHANGE UP (ref 3.5–5.3)
PROT FLD-MCNC: 4.5 G/DL — SIGNIFICANT CHANGE UP
PROTHROM AB SERPL-ACNC: 12.5 SEC — SIGNIFICANT CHANGE UP (ref 10.5–13.4)
RBC # BLD: 3.56 M/UL — LOW (ref 3.8–5.2)
RBC # FLD: 12.2 % — SIGNIFICANT CHANGE UP (ref 10.3–14.5)
RCV VOL RI: 1000 CELLS/UL — HIGH (ref 0–5)
SODIUM SERPL-SCNC: 138 MMOL/L — SIGNIFICANT CHANGE UP (ref 135–145)
SPECIMEN SOURCE FLD: SIGNIFICANT CHANGE UP
TOTAL NUCLEATED CELL COUNT, BODY FLUID: 1755 CELLS/UL — HIGH (ref 0–5)
TRIGL FLD-MCNC: 16 MG/DL — SIGNIFICANT CHANGE UP
TRIGL SERPL-MCNC: 71 MG/DL — SIGNIFICANT CHANGE UP
TSH SERPL-MCNC: 3.01 UIU/ML — SIGNIFICANT CHANGE UP (ref 0.27–4.2)
TUBE TYPE: SIGNIFICANT CHANGE UP
WBC # BLD: 4.16 K/UL — SIGNIFICANT CHANGE UP (ref 3.8–10.5)
WBC # FLD AUTO: 4.16 K/UL — SIGNIFICANT CHANGE UP (ref 3.8–10.5)

## 2022-09-01 PROCEDURE — 31645 BRNCHSC W/THER ASPIR 1ST: CPT | Mod: GC

## 2022-09-01 PROCEDURE — 88360 TUMOR IMMUNOHISTOCHEM/MANUAL: CPT | Mod: 26,59

## 2022-09-01 PROCEDURE — 76604 US EXAM CHEST: CPT | Mod: 26,GC

## 2022-09-01 PROCEDURE — 88112 CYTOPATH CELL ENHANCE TECH: CPT | Mod: 26,59

## 2022-09-01 PROCEDURE — 32555 ASPIRATE PLEURA W/ IMAGING: CPT | Mod: GC,59

## 2022-09-01 PROCEDURE — 88342 IMHCHEM/IMCYTCHM 1ST ANTB: CPT | Mod: 26

## 2022-09-01 PROCEDURE — 88173 CYTOPATH EVAL FNA REPORT: CPT | Mod: 26

## 2022-09-01 PROCEDURE — 31652 BRONCH EBUS SAMPLNG 1/2 NODE: CPT | Mod: GC

## 2022-09-01 PROCEDURE — 88305 TISSUE EXAM BY PATHOLOGIST: CPT | Mod: 26

## 2022-09-01 PROCEDURE — 88341 IMHCHEM/IMCYTCHM EA ADD ANTB: CPT | Mod: 26

## 2022-09-01 PROCEDURE — 88108 CYTOPATH CONCENTRATE TECH: CPT | Mod: 26,59

## 2022-09-01 PROCEDURE — 99233 SBSQ HOSP IP/OBS HIGH 50: CPT

## 2022-09-01 PROCEDURE — 31641 BRONCHOSCOPY TREAT BLOCKAGE: CPT | Mod: GC

## 2022-09-01 PROCEDURE — 99233 SBSQ HOSP IP/OBS HIGH 50: CPT | Mod: GC,25

## 2022-09-01 DEVICE — PACK THORACENTESIS CHG: Type: IMPLANTABLE DEVICE | Status: FUNCTIONAL

## 2022-09-01 RX ORDER — ONDANSETRON 8 MG/1
4 TABLET, FILM COATED ORAL ONCE
Refills: 0 | Status: DISCONTINUED | OUTPATIENT
Start: 2022-09-01 | End: 2022-09-01

## 2022-09-01 RX ORDER — HEPARIN SODIUM 5000 [USP'U]/ML
2000 INJECTION INTRAVENOUS; SUBCUTANEOUS EVERY 6 HOURS
Refills: 0 | Status: DISCONTINUED | OUTPATIENT
Start: 2022-09-01 | End: 2022-09-02

## 2022-09-01 RX ORDER — FENTANYL CITRATE 50 UG/ML
50 INJECTION INTRAVENOUS
Refills: 0 | Status: DISCONTINUED | OUTPATIENT
Start: 2022-09-01 | End: 2022-09-01

## 2022-09-01 RX ORDER — HEPARIN SODIUM 5000 [USP'U]/ML
INJECTION INTRAVENOUS; SUBCUTANEOUS
Qty: 25000 | Refills: 0 | Status: DISCONTINUED | OUTPATIENT
Start: 2022-09-01 | End: 2022-09-02

## 2022-09-01 RX ORDER — OXYCODONE HYDROCHLORIDE 5 MG/1
5 TABLET ORAL ONCE
Refills: 0 | Status: DISCONTINUED | OUTPATIENT
Start: 2022-09-01 | End: 2022-09-01

## 2022-09-01 RX ORDER — POLYETHYLENE GLYCOL 3350 17 G/17G
17 POWDER, FOR SOLUTION ORAL DAILY
Refills: 0 | Status: DISCONTINUED | OUTPATIENT
Start: 2022-09-01 | End: 2022-09-02

## 2022-09-01 RX ORDER — HYDROMORPHONE HYDROCHLORIDE 2 MG/ML
0.25 INJECTION INTRAMUSCULAR; INTRAVENOUS; SUBCUTANEOUS
Refills: 0 | Status: DISCONTINUED | OUTPATIENT
Start: 2022-09-01 | End: 2022-09-01

## 2022-09-01 RX ORDER — FENTANYL CITRATE 50 UG/ML
25 INJECTION INTRAVENOUS
Refills: 0 | Status: DISCONTINUED | OUTPATIENT
Start: 2022-09-01 | End: 2022-09-01

## 2022-09-01 RX ORDER — HEPARIN SODIUM 5000 [USP'U]/ML
4000 INJECTION INTRAVENOUS; SUBCUTANEOUS EVERY 6 HOURS
Refills: 0 | Status: DISCONTINUED | OUTPATIENT
Start: 2022-09-01 | End: 2022-09-02

## 2022-09-01 RX ORDER — SENNA PLUS 8.6 MG/1
2 TABLET ORAL AT BEDTIME
Refills: 0 | Status: DISCONTINUED | OUTPATIENT
Start: 2022-09-01 | End: 2022-09-02

## 2022-09-01 RX ADMIN — Medication 1 MILLIGRAM(S): at 13:49

## 2022-09-01 RX ADMIN — SENNA PLUS 2 TABLET(S): 8.6 TABLET ORAL at 22:03

## 2022-09-01 RX ADMIN — HEPARIN SODIUM 900 UNIT(S)/HR: 5000 INJECTION INTRAVENOUS; SUBCUTANEOUS at 19:46

## 2022-09-01 RX ADMIN — Medication 3 MILLIGRAM(S): at 05:07

## 2022-09-01 RX ADMIN — AMLODIPINE BESYLATE 2.5 MILLIGRAM(S): 2.5 TABLET ORAL at 05:03

## 2022-09-01 RX ADMIN — POLYETHYLENE GLYCOL 3350 17 GRAM(S): 17 POWDER, FOR SOLUTION ORAL at 15:18

## 2022-09-01 RX ADMIN — SIMVASTATIN 40 MILLIGRAM(S): 20 TABLET, FILM COATED ORAL at 22:28

## 2022-09-01 RX ADMIN — Medication 200 MILLIGRAM(S): at 13:43

## 2022-09-01 RX ADMIN — HEPARIN SODIUM 900 UNIT(S)/HR: 5000 INJECTION INTRAVENOUS; SUBCUTANEOUS at 20:33

## 2022-09-01 NOTE — PROGRESS NOTE ADULT - ATTENDING COMMENTS
Patient seen and examined at the bedside earlier today. Underwent flexible bronchoscopy today with EBUS and APC ablation of mucosal infiltration as well as left sided thoracentesis. Preliminary findings concerning. Pleural fluid appears exudative. Discussed with patient,  and son. Recommend MRI Brain w/wo contrast for CNS eval. Will need oncology eval on discharge after biopsy results. Pulmonary follow up in 2-3 weeks to assess for pleural fluid recurrence. Ok to transition to DOAC/Lovenox if no bleeding. Page IP team with questions.

## 2022-09-01 NOTE — PROCEDURE NOTE - NSINFORMCONSENT_GEN_A_CORE
LEFT/Benefits, risks, and possible complications of procedure explained to patient/caregiver who verbalized understanding and gave written consent.

## 2022-09-01 NOTE — BRIEF OPERATIVE NOTE - OPERATION/FINDINGS
Flexible bronchoscopy, Endobronchial Ultrasound LN biopsy, thoracentesis Flexible bronchoscopy, Endobronchial Ultrasound LN biopsy, APC assisted ablation and hemostasis, left sided ultrasound guided thoracentesis    #[id number]

## 2022-09-02 ENCOUNTER — TRANSCRIPTION ENCOUNTER (OUTPATIENT)
Age: 82
End: 2022-09-02

## 2022-09-02 VITALS
SYSTOLIC BLOOD PRESSURE: 130 MMHG | RESPIRATION RATE: 18 BRPM | DIASTOLIC BLOOD PRESSURE: 69 MMHG | HEART RATE: 81 BPM | OXYGEN SATURATION: 98 % | TEMPERATURE: 98 F

## 2022-09-02 LAB
ALBUMIN SERPL ELPH-MCNC: 3.6 G/DL — SIGNIFICANT CHANGE UP (ref 3.3–5)
ALP SERPL-CCNC: 63 U/L — SIGNIFICANT CHANGE UP (ref 40–120)
ALT FLD-CCNC: 11 U/L — SIGNIFICANT CHANGE UP (ref 4–33)
ANION GAP SERPL CALC-SCNC: 10 MMOL/L — SIGNIFICANT CHANGE UP (ref 7–14)
APTT BLD: 101.1 SEC — HIGH (ref 27–36.3)
APTT BLD: 26.3 SEC — LOW (ref 27–36.3)
APTT BLD: 41.7 SEC — HIGH (ref 27–36.3)
AST SERPL-CCNC: 16 U/L — SIGNIFICANT CHANGE UP (ref 4–32)
BILIRUB SERPL-MCNC: 0.2 MG/DL — SIGNIFICANT CHANGE UP (ref 0.2–1.2)
BUN SERPL-MCNC: 18 MG/DL — SIGNIFICANT CHANGE UP (ref 7–23)
CALCIUM SERPL-MCNC: 8.9 MG/DL — SIGNIFICANT CHANGE UP (ref 8.4–10.5)
CHLORIDE SERPL-SCNC: 106 MMOL/L — SIGNIFICANT CHANGE UP (ref 98–107)
CO2 SERPL-SCNC: 24 MMOL/L — SIGNIFICANT CHANGE UP (ref 22–31)
COMMENT - FLUIDS: SIGNIFICANT CHANGE UP
CREAT SERPL-MCNC: 0.8 MG/DL — SIGNIFICANT CHANGE UP (ref 0.5–1.3)
EGFR: 74 ML/MIN/1.73M2 — SIGNIFICANT CHANGE UP
GLUCOSE SERPL-MCNC: 101 MG/DL — HIGH (ref 70–99)
HCT VFR BLD CALC: 32.3 % — LOW (ref 34.5–45)
HCT VFR BLD CALC: 32.8 % — LOW (ref 34.5–45)
HGB BLD-MCNC: 10.4 G/DL — LOW (ref 11.5–15.5)
HGB BLD-MCNC: 10.9 G/DL — LOW (ref 11.5–15.5)
LDH SERPL L TO P-CCNC: 335 U/L — HIGH (ref 135–225)
MAGNESIUM SERPL-MCNC: 2.2 MG/DL — SIGNIFICANT CHANGE UP (ref 1.6–2.6)
MCHC RBC-ENTMCNC: 29.5 PG — SIGNIFICANT CHANGE UP (ref 27–34)
MCHC RBC-ENTMCNC: 29.9 PG — SIGNIFICANT CHANGE UP (ref 27–34)
MCHC RBC-ENTMCNC: 32.2 GM/DL — SIGNIFICANT CHANGE UP (ref 32–36)
MCHC RBC-ENTMCNC: 33.2 GM/DL — SIGNIFICANT CHANGE UP (ref 32–36)
MCV RBC AUTO: 89.9 FL — SIGNIFICANT CHANGE UP (ref 80–100)
MCV RBC AUTO: 91.8 FL — SIGNIFICANT CHANGE UP (ref 80–100)
NRBC # BLD: 0 /100 WBCS — SIGNIFICANT CHANGE UP (ref 0–0)
NRBC # BLD: 0 /100 WBCS — SIGNIFICANT CHANGE UP (ref 0–0)
NRBC # FLD: 0 K/UL — SIGNIFICANT CHANGE UP (ref 0–0)
NRBC # FLD: 0 K/UL — SIGNIFICANT CHANGE UP (ref 0–0)
PHOSPHATE SERPL-MCNC: 2.7 MG/DL — SIGNIFICANT CHANGE UP (ref 2.5–4.5)
PLATELET # BLD AUTO: 281 K/UL — SIGNIFICANT CHANGE UP (ref 150–400)
PLATELET # BLD AUTO: 285 K/UL — SIGNIFICANT CHANGE UP (ref 150–400)
POTASSIUM SERPL-MCNC: 4.4 MMOL/L — SIGNIFICANT CHANGE UP (ref 3.5–5.3)
POTASSIUM SERPL-SCNC: 4.4 MMOL/L — SIGNIFICANT CHANGE UP (ref 3.5–5.3)
PROT SERPL-MCNC: 7.1 G/DL — SIGNIFICANT CHANGE UP (ref 6–8.3)
RBC # BLD: 3.52 M/UL — LOW (ref 3.8–5.2)
RBC # BLD: 3.65 M/UL — LOW (ref 3.8–5.2)
RBC # FLD: 11.9 % — SIGNIFICANT CHANGE UP (ref 10.3–14.5)
RBC # FLD: 12.2 % — SIGNIFICANT CHANGE UP (ref 10.3–14.5)
SODIUM SERPL-SCNC: 140 MMOL/L — SIGNIFICANT CHANGE UP (ref 135–145)
SPECIMEN SOURCE FLD: SIGNIFICANT CHANGE UP
WBC # BLD: 5.08 K/UL — SIGNIFICANT CHANGE UP (ref 3.8–10.5)
WBC # BLD: 6.39 K/UL — SIGNIFICANT CHANGE UP (ref 3.8–10.5)
WBC # FLD AUTO: 5.08 K/UL — SIGNIFICANT CHANGE UP (ref 3.8–10.5)
WBC # FLD AUTO: 6.39 K/UL — SIGNIFICANT CHANGE UP (ref 3.8–10.5)

## 2022-09-02 PROCEDURE — 99233 SBSQ HOSP IP/OBS HIGH 50: CPT | Mod: GC

## 2022-09-02 PROCEDURE — 99239 HOSP IP/OBS DSCHRG MGMT >30: CPT

## 2022-09-02 PROCEDURE — 71045 X-RAY EXAM CHEST 1 VIEW: CPT | Mod: 26

## 2022-09-02 RX ORDER — POLYETHYLENE GLYCOL 3350 17 G/17G
17 POWDER, FOR SOLUTION ORAL
Qty: 0 | Refills: 0 | DISCHARGE
Start: 2022-09-02

## 2022-09-02 RX ORDER — APIXABAN 2.5 MG/1
1 TABLET, FILM COATED ORAL
Qty: 60 | Refills: 0
Start: 2022-09-02

## 2022-09-02 RX ORDER — APIXABAN 2.5 MG/1
1 TABLET, FILM COATED ORAL
Qty: 60 | Refills: 0
Start: 2022-09-02 | End: 2022-10-01

## 2022-09-02 RX ORDER — APIXABAN 2.5 MG/1
10 TABLET, FILM COATED ORAL EVERY 12 HOURS
Refills: 0 | Status: DISCONTINUED | OUTPATIENT
Start: 2022-09-02 | End: 2022-09-02

## 2022-09-02 RX ORDER — LANOLIN ALCOHOL/MO/W.PET/CERES
1 CREAM (GRAM) TOPICAL
Qty: 0 | Refills: 0 | DISCHARGE
Start: 2022-09-02

## 2022-09-02 RX ORDER — APIXABAN 2.5 MG/1
2 TABLET, FILM COATED ORAL
Qty: 120 | Refills: 0
Start: 2022-09-02 | End: 2022-10-01

## 2022-09-02 RX ORDER — SENNA PLUS 8.6 MG/1
2 TABLET ORAL
Qty: 0 | Refills: 0 | DISCHARGE
Start: 2022-09-02

## 2022-09-02 RX ORDER — ACETAMINOPHEN 500 MG
2 TABLET ORAL
Qty: 0 | Refills: 0 | DISCHARGE
Start: 2022-09-02

## 2022-09-02 RX ORDER — APIXABAN 2.5 MG/1
2 TABLET, FILM COATED ORAL
Qty: 56 | Refills: 0
Start: 2022-09-02 | End: 2022-09-15

## 2022-09-02 RX ADMIN — APIXABAN 10 MILLIGRAM(S): 2.5 TABLET, FILM COATED ORAL at 18:07

## 2022-09-02 RX ADMIN — Medication 3 MILLIGRAM(S): at 02:08

## 2022-09-02 RX ADMIN — Medication 200 MILLIGRAM(S): at 12:41

## 2022-09-02 RX ADMIN — HEPARIN SODIUM 1100 UNIT(S)/HR: 5000 INJECTION INTRAVENOUS; SUBCUTANEOUS at 02:23

## 2022-09-02 RX ADMIN — POLYETHYLENE GLYCOL 3350 17 GRAM(S): 17 POWDER, FOR SOLUTION ORAL at 12:41

## 2022-09-02 RX ADMIN — AMLODIPINE BESYLATE 2.5 MILLIGRAM(S): 2.5 TABLET ORAL at 06:25

## 2022-09-02 RX ADMIN — HEPARIN SODIUM 1000 UNIT(S)/HR: 5000 INJECTION INTRAVENOUS; SUBCUTANEOUS at 10:28

## 2022-09-02 RX ADMIN — HEPARIN SODIUM 1100 UNIT(S)/HR: 5000 INJECTION INTRAVENOUS; SUBCUTANEOUS at 08:21

## 2022-09-02 RX ADMIN — HEPARIN SODIUM 4000 UNIT(S): 5000 INJECTION INTRAVENOUS; SUBCUTANEOUS at 02:26

## 2022-09-02 RX ADMIN — Medication 1 MILLIGRAM(S): at 12:41

## 2022-09-02 NOTE — DISCHARGE NOTE PROVIDER - NSDCQMAMI_CARD_ALL_CORE
Su Olivera. Signed orders per protocol. No

## 2022-09-02 NOTE — DISCHARGE NOTE NURSING/CASE MANAGEMENT/SOCIAL WORK - NSDCFUADDAPPT_GEN_ALL_CORE_FT
Pulmonology office should call you with your follow up appointment   If you do not hear from them you can call the office to speak with them   [phone]

## 2022-09-02 NOTE — PROGRESS NOTE ADULT - PROBLEM SELECTOR PLAN 3
low salt, low fat, low cholesterol   Continue norvasc
low salt, low fat, low cholesterol   Continue norvasc

## 2022-09-02 NOTE — DISCHARGE NOTE PROVIDER - HOSPITAL COURSE
82yo F w/ history of RA, HTN, and HLD coming in for outpatient CT that showed acute segmental PE in SANDOVAL, pericardial effusion and findings concerning for a neoplastic process by Dr. Chiu. Patient c/o left upper abdominal pain with findings of new left sided pleural effusion, pleural nodularity, thoracic adenopathy, and possible extrathoracic metastatic disease. Patient is s/p thoracentesis, EBUS, bronch. Path sent. Stable post-procedure. Awaiting MRI for staging. Dispo planning.      Problem/Plan - 1:  ·  Problem: Pulmonary embolism.   ·  Plan: Acute segmental/subsegmental SANDOVAL PE - may be 2' malignancy.  ECHO done. Small pericardial eff. No RV dysfunction   Resumed on heparin drip - tolerating. Will anticipate transition to DOAC. Eliquis$28 copay starter gary free   -MRI pending - will eval for mets.  does not want to stay in house for MRI and wishes for it to be done as OP.  Dr. Fox has explained the need for MRI of brain to eval for metastisis which could cause bleeding  is aware and still requests OP MRI and wife is in agreement    -Pleuritic CP likely 2' PE v. underlying malignancy.   -Appreciate pulm recs. F/u as OP.     Problem/Plan - 2:  ·  Problem: At risk for secondary malignancy.   ·  Plan: -Concerning findings on imaging. Thoracic findings noted on imaging, admitted for diagnostic workup   -S/p EBUS, thora, LN bx.   -Path to be followed as OP.     Problem/Plan - 3:  ·  Problem: HTN (hypertension).   ·  Plan: low salt, low fat, low cholesterol   Continue norvasc.     Problem/Plan - 4:  ·  Problem: Rheumatoid arthritis.   ·  Plan: Continue hydroxychloriquine  Cont Enbrel as OP fu with rheumatology MD        Problem/Plan - 5:  ·  Problem: Hyperlipidemia.   ·  Plan: Stable zocor.     Problem/Plan - 6:  ·  Problem: Prophylactic measure.   ·  Plan: DVT ppx- heparin drip - test Rx for Eliquis for DC.    Pt seen by Dr Fox and cleared for discharge 9/2/22 Risks reviewed with /Dr Fox and he wishes to take her for OP MRI Brain   Dispo: home

## 2022-09-02 NOTE — PROGRESS NOTE ADULT - PROBLEM SELECTOR PLAN 1
Acute segmental/subsegmental SANDOVAL PE - may be 2' malignancy.  ECHO done. Small pericardial eff. No RV dysfunction   Resumed on heparin drip - tolerating. Will anticipate transition to DOAC. Will do test script for Eliquis.  -MRI pending - will eval for mets. If any positive findings, would further discuss w/ oncology re: anticoagulation.   -Pleuritic CP likely 2' PE v. underlying malignancy.   -Appreciate pulm recs. F/u as OP.
Acute segmental/subsegmental SANDOVAL PE  TTE ordered to eval R heart strain and pericardial effusion  Heparin drip held due to procedure today.  -Awaiting pulm f/u for clearance after bronch/EBUS/thora.

## 2022-09-02 NOTE — DISCHARGE NOTE PROVIDER - CARE PROVIDER_API CALL
Axel Dos Santos)  Critical Care Medicine; Internal Medicine; Pulmonary Disease  [address] [place] [address]  Phone: [phone]  Fax: [phone]  Follow Up Time:

## 2022-09-02 NOTE — DISCHARGE NOTE NURSING/CASE MANAGEMENT/SOCIAL WORK - PATIENT PORTAL LINK FT
You can access the FollowMyHealth Patient Portal offered by [facility] by registering at the following website: [email or web address]. By joining [email or web address]’s FollowMyHealth portal, you will also be able to view your health information using other applications (apps) compatible with our system.

## 2022-09-02 NOTE — PROGRESS NOTE ADULT - SUBJECTIVE AND OBJECTIVE BOX
CHIEF COMPLAINT:    Interval Events:    Plan for bronchscopy today. Heparingtt is being held.     REVIEW OF SYSTEMS:  Constitutional: No fevers or chills. No weight loss. No fatigue or generalised malaise.  Eyes: No itching or discharge from the eyes  ENT: No ear pain. No ear discharge. No nasal congestion. No post nasal drip. No epistaxis. No throat pain. No sore throat. No difficulty swallowing.   CV: No chest pain. No palpitations. No lightheadedness or dizziness.   Resp: No dyspnea at rest. No dyspnea on exertion. No orthopnea. No wheezing. No cough. No stridor. No sputum production. No chest pain with respiration.  GI: No nausea. No vomiting. No diarrhea.  MSK: No joint pain or pain in any extremities  Integumentary: No skin lesions. No pedal edema.  Neurological: No gross motor weakness. No sensory changes.  [ ] All other systems negative  [ ] Unable to assess ROS because ________    OBJECTIVE:  ICU Vital Signs Last 24 Hrs  T(C): 36.7 (01 Sep 2022 05:00), Max: 36.9 (01 Sep 2022 00:54)  T(F): 98.1 (01 Sep 2022 05:00), Max: 98.4 (01 Sep 2022 00:54)  HR: 79 (01 Sep 2022 05:00) (64 - 79)  BP: 142/78 (01 Sep 2022 05:00) (121/70 - 142/78)  BP(mean): --  ABP: --  ABP(mean): --  RR: 18 (01 Sep 2022 05:00) (16 - 20)  SpO2: 96% (01 Sep 2022 05:00) (96% - 100%)    O2 Parameters below as of 01 Sep 2022 05:00  Patient On (Oxygen Delivery Method): room air              CAPILLARY BLOOD GLUCOSE          PHYSICAL EXAM:  General: Awake, alert, oriented X 3.   HEENT: Atraumatic, normocephalic.                  No tonsillar or pharyngeal exudates.  Lymph Nodes: No palpable lymphadenopathy  Neck: No JVD. No carotid bruit.   Respiratory: normal breath sounds b/l  Cardiovascular: S1 S2 normal. No murmurs, rubs or gallops.   Abdomen: Soft, non-tender, non-distended. No organomegaly.  Extremities: Warm to touch. Peripheral pulse palpable. No pedal edema. Mild swelling.   Skin: No rashes or skin lesions  Neurological: Non-focal  Psychiatry: Appropriate mood and affect.    HOSPITAL MEDICATIONS:  MEDICATIONS  (STANDING):  amLODIPine   Tablet 2.5 milliGRAM(s) Oral daily  folic acid 1 milliGRAM(s) Oral daily  hydroxychloroquine 200 milliGRAM(s) Oral daily  simvastatin 40 milliGRAM(s) Oral at bedtime    MEDICATIONS  (PRN):  acetaminophen     Tablet .. 650 milliGRAM(s) Oral every 6 hours PRN Temp greater or equal to 38C (100.4F), Mild Pain (1 - 3)  melatonin 3 milliGRAM(s) Oral at bedtime PRN Insomnia      LABS:                        10.4   4.16  )-----------( 250      ( 01 Sep 2022 07:10 )             33.1     09-01    138  |  104  |  17  ----------------------------<  88  3.9   |  22  |  0.81    Ca    8.9      01 Sep 2022 07:10  Phos  2.9     09-01  Mg     2.10     09-01    TPro  7.5  /  Alb  3.9  /  TBili  0.2  /  DBili  x   /  AST  19  /  ALT  13  /  AlkPhos  67  08-30    PT/INR - ( 01 Sep 2022 07:10 )   PT: 12.5 sec;   INR: 1.08 ratio         PTT - ( 01 Sep 2022 07:10 )  PTT:26.3 sec          MICROBIOLOGY:     RADIOLOGY:  [ ] Reviewed and interpreted by me    Point of Care Ultrasound Findings:    PFT:    EKG:
CHIEF COMPLAINT:    Interval Events:    Pt seen and examined at bedside. S.p bronchoscopy yesterday     REVIEW OF SYSTEMS:  Constitutional: No fevers or chills. No weight loss. No fatigue or generalised malaise.  Eyes: No itching or discharge from the eyes  ENT: No ear pain. No ear discharge. No nasal congestion. No post nasal drip. No epistaxis. No throat pain. No sore throat. No difficulty swallowing.   CV: No chest pain. No palpitations. No lightheadedness or dizziness.   Resp: No dyspnea at rest. No dyspnea on exertion. No orthopnea. No wheezing. No cough. No stridor. No sputum production. No chest pain with respiration.  GI: No nausea. No vomiting. No diarrhea.  MSK: No joint pain or pain in any extremities  Integumentary: No skin lesions. No pedal edema.  Neurological: No gross motor weakness. No sensory changes.  [ ] All other systems negative  [ ] Unable to assess ROS because ________    OBJECTIVE:  ICU Vital Signs Last 24 Hrs  T(C): 36.7 (02 Sep 2022 06:24), Max: 37.3 (01 Sep 2022 09:39)  T(F): 98 (02 Sep 2022 06:24), Max: 99.1 (01 Sep 2022 09:39)  HR: 83 (02 Sep 2022 06:24) (72 - 83)  BP: 147/83 (02 Sep 2022 06:24) (113/71 - 149/68)  BP(mean): 79 (01 Sep 2022 13:30) (79 - 90)  ABP: --  ABP(mean): --  RR: 17 (02 Sep 2022 06:24) (12 - 21)  SpO2: 97% (02 Sep 2022 06:24) (95% - 100%)    O2 Parameters below as of 02 Sep 2022 06:24  Patient On (Oxygen Delivery Method): room air              09-01 @ 07:01  -  09-02 @ 07:00  --------------------------------------------------------  IN: 100 mL / OUT: 0 mL / NET: 100 mL      CAPILLARY BLOOD GLUCOSE        PHYSICAL EXAM:  General: Awake, alert, oriented X 3.   HEENT: Atraumatic, normocephalic.                  No tonsillar or pharyngeal exudates.  Lymph Nodes: No palpable lymphadenopathy  Neck: No JVD. No carotid bruit.   Respiratory: normal breath sounds b/l  Cardiovascular: S1 S2 normal. No murmurs, rubs or gallops.   Abdomen: Soft, non-tender, non-distended. No organomegaly.  Extremities: Warm to touch. Peripheral pulse palpable. No pedal edema. Mild swelling.   Skin: No rashes or skin lesions  Neurological: Non-focal  Psychiatry: Appropriate mood and affect.      HOSPITAL MEDICATIONS:  MEDICATIONS  (STANDING):  amLODIPine   Tablet 2.5 milliGRAM(s) Oral daily  folic acid 1 milliGRAM(s) Oral daily  heparin  Infusion.  Unit(s)/Hr (9 mL/Hr) IV Continuous <Continuous>  hydroxychloroquine 200 milliGRAM(s) Oral daily  polyethylene glycol 3350 17 Gram(s) Oral daily  senna 2 Tablet(s) Oral at bedtime  simvastatin 40 milliGRAM(s) Oral at bedtime    MEDICATIONS  (PRN):  acetaminophen     Tablet .. 650 milliGRAM(s) Oral every 6 hours PRN Temp greater or equal to 38C (100.4F), Mild Pain (1 - 3)  heparin   Injectable 4000 Unit(s) IV Push every 6 hours PRN For aPTT less than 40  heparin   Injectable 2000 Unit(s) IV Push every 6 hours PRN For aPTT between 40 - 57  melatonin 3 milliGRAM(s) Oral at bedtime PRN Insomnia      LABS:                        10.9   5.08  )-----------( 285      ( 02 Sep 2022 01:46 )             32.8     09-01    138  |  104  |  17  ----------------------------<  88  3.9   |  22  |  0.81    Ca    8.9      01 Sep 2022 07:10  Phos  2.9     09-01  Mg     2.10     09-01      PT/INR - ( 01 Sep 2022 07:10 )   PT: 12.5 sec;   INR: 1.08 ratio         PTT - ( 02 Sep 2022 01:46 )  PTT:26.3 sec          MICROBIOLOGY:     RADIOLOGY:  [ ] Reviewed and interpreted by me    Point of Care Ultrasound Findings:    PFT:    EKG:
[facility] Division of Hospital Medicine  Félix MichelRadu) MD Ruddy  Pager [phone]    SUBJECTIVE:  Chief complaint: PE.    Pt seen and evaluated at bedside this AM. No o/n events. Denies any SOB/CP/NV. Just got out of bronch.       ROS: All systems negative except as noted.      Vital Signs Last 24 Hrs  T(C): 36.4 (01 Sep 2022 13:00), Max: 37.3 (01 Sep 2022 09:39)  T(F): 97.5 (01 Sep 2022 13:00), Max: 99.1 (01 Sep 2022 09:39)  HR: 73 (01 Sep 2022 13:45) (67 - 81)  BP: 129/65 (01 Sep 2022 13:30) (122/71 - 149/68)  BP(mean): 79 (01 Sep 2022 13:30) (79 - 90)  RR: 19 (01 Sep 2022 13:45) (12 - 21)  SpO2: 95% (01 Sep 2022 13:30) (95% - 100%)    Parameters below as of 01 Sep 2022 12:30  Patient On (Oxygen Delivery Method): room air      PHYSICAL EXAM:  Gen- In bed, comfortable, NAD  Eyes- EOMI, PERRLA, nonicteric.  EMNT- Fair dentition. MMM. No tonsilar exudates. No posterior pharynx erythema.  Neck- Supple. No masses. No tracheal deviation.  Resp- CTAB, normal effort. No r/r/w. No accessory muscle use.  CVS- RRR, S1S2, no g/r/m. No LE edema.  GI- Soft abd, NT, ND, +BSx4. No HSM.  MSK- No C/C. ROM intact. No crepitus.  Neuro- CN II-XII intact. Speech fluent/face symmetric. Sensation intact.  Skin- No rashes/ulcers. Warm/moist.  Psych- Appropriate mood/affect.      MEDICATION:  MEDICATIONS  (STANDING):  amLODIPine   Tablet 2.5 milliGRAM(s) Oral daily  folic acid 1 milliGRAM(s) Oral daily  hydroxychloroquine 200 milliGRAM(s) Oral daily  polyethylene glycol 3350 17 Gram(s) Oral daily  senna 2 Tablet(s) Oral at bedtime  simvastatin 40 milliGRAM(s) Oral at bedtime    MEDICATIONS  (PRN):  acetaminophen     Tablet .. 650 milliGRAM(s) Oral every 6 hours PRN Temp greater or equal to 38C (100.4F), Mild Pain (1 - 3)  fentaNYL    Injectable 25 MICROGram(s) IV Push every 5 minutes PRN Moderate Pain (4 - 6)  fentaNYL    Injectable 50 MICROGram(s) IV Push every 10 minutes PRN Severe Pain (7 - 10)  HYDROmorphone  Injectable 0.25 milliGRAM(s) IV Push every 15 minutes PRN Moderate Pain (4 - 6)  melatonin 3 milliGRAM(s) Oral at bedtime PRN Insomnia  ondansetron Injectable 4 milliGRAM(s) IV Push once PRN Nausea and/or Vomiting  oxyCODONE    IR 5 milliGRAM(s) Oral once PRN Moderate Pain (4 - 6)            LABORATORY:                          10.4   4.16  )-----------( 250      ( 01 Sep 2022 07:10 )             33.1     09-01    138  |  104  |  17  ----------------------------<  88  3.9   |  22  |  0.81    Ca    8.9      01 Sep 2022 07:10  Phos  2.9     09-01  Mg     2.10     09-01    TPro  7.5  /  Alb  3.9  /  TBili  0.2  /  DBili  x   /  AST  19  /  ALT  13  /  AlkPhos  67  08-30    PT/INR - ( 01 Sep 2022 07:10 )   PT: 12.5 sec;   INR: 1.08 ratio         PTT - ( 01 Sep 2022 07:10 )  PTT:26.3 sec          COVID-19 PCR: NotDetec (31 Aug 2022 00:01)  COVID-19 PCR: NotDetec (26 Aug 2022 16:47)              
[facility] Division of Hospital Medicine  Félix MichelRadu) MD Ruddy  Pager [phone]    SUBJECTIVE:  Chief complaint: PE.    Pt seen and evaluated at bedside this AM. No o/n events. Denies any SOB. She does report substernal CP, worse w/ inspiration. No f/c. Tolerating PO. No LE swelling. Spouse at bedside.       ROS: All systems negative except as noted.      Vital Signs Last 24 Hrs  T(C): 36.7 (02 Sep 2022 12:41), Max: 36.7 (02 Sep 2022 06:24)  T(F): 98 (02 Sep 2022 12:41), Max: 98 (02 Sep 2022 06:24)  HR: 76 (02 Sep 2022 12:41) (76 - 83)  BP: 112/64 (02 Sep 2022 12:41) (112/64 - 147/83)  BP(mean): --  RR: 18 (02 Sep 2022 12:41) (12 - 18)  SpO2: 97% (02 Sep 2022 12:41) (96% - 98%)    Parameters below as of 02 Sep 2022 12:41  Patient On (Oxygen Delivery Method): room air      PHYSICAL EXAM:  Gen- In bed, comfortable, NAD  Eyes- EOMI, PERRLA, nonicteric.  EMNT- Fair dentition. MMM. No tonsilar exudates. No posterior pharynx erythema.  Neck- Supple. No masses. No tracheal deviation.  Resp- CTAB, normal effort. No r/r/w. No accessory muscle use.  CVS- RRR, S1S2, no g/r/m. No LE edema.  GI- Soft abd, NT, ND, +BSx4. No HSM.  MSK- No C/C. ROM intact. No crepitus. LEFT chest wall thora site C/d/i.  Neuro- CN II-XII intact. Speech fluent/face symmetric. Sensation intact.  Skin- No rashes/ulcers. Warm/moist.  Psych- Appropriate mood/affect.      MEDICATION:  MEDICATIONS  (STANDING):  amLODIPine   Tablet 2.5 milliGRAM(s) Oral daily  folic acid 1 milliGRAM(s) Oral daily  heparin  Infusion.  Unit(s)/Hr (9 mL/Hr) IV Continuous <Continuous>  hydroxychloroquine 200 milliGRAM(s) Oral daily  polyethylene glycol 3350 17 Gram(s) Oral daily  senna 2 Tablet(s) Oral at bedtime  simvastatin 40 milliGRAM(s) Oral at bedtime    MEDICATIONS  (PRN):  acetaminophen     Tablet .. 650 milliGRAM(s) Oral every 6 hours PRN Temp greater or equal to 38C (100.4F), Mild Pain (1 - 3)  heparin   Injectable 4000 Unit(s) IV Push every 6 hours PRN For aPTT less than 40  heparin   Injectable 2000 Unit(s) IV Push every 6 hours PRN For aPTT between 40 - 57  melatonin 3 milliGRAM(s) Oral at bedtime PRN Insomnia        LABORATORY:                          10.4   6.39  )-----------( 281      ( 02 Sep 2022 09:02 )             32.3     09-02    140  |  106  |  18  ----------------------------<  101<H>  4.4   |  24  |  0.80    Ca    8.9      02 Sep 2022 09:02  Phos  2.7     09-02  Mg     2.20     09-02    TPro  7.1  /  Alb  3.6  /  TBili  0.2  /  DBili  x   /  AST  16  /  ALT  11  /  AlkPhos  63  09-02    PT/INR - ( 01 Sep 2022 07:10 )   PT: 12.5 sec;   INR: 1.08 ratio         PTT - ( 02 Sep 2022 09:02 )  PTT:101.1 sec          COVID-19 PCR: Calebtejesus (31 Aug 2022 00:01)  COVID-19 PCR: Robert (26 Aug 2022 16:47)

## 2022-09-02 NOTE — PROGRESS NOTE ADULT - TIME BILLING
patient care
review of laboratory data, radiology results, discussion with primary team\patient, and monitoring for potential decompensation. Interventions were performed as documented above. Time also spent discussing staging, possible diagnosis, possible treatment plans, appropriate imaging and referral to specialists as well as discussing management for recurrence of pleural effusion.

## 2022-09-02 NOTE — DISCHARGE NOTE NURSING/CASE MANAGEMENT/SOCIAL WORK - DATE OF LAST VACCINATION
Problem: Patient Care Overview  Goal: Plan of Care Review   07/01/19 1821   Plan of Care Review   Progress improving   Coping/Psychosocial   Plan of Care Reviewed With patient   OTHER   Outcome Summary Patient states he is back at his baseline for breathing. He is back on his home amount of o2 at rest. He will be using 6 liters with exertion and ghoulds will bring out a new concentrator. He is anxious to gome home with his daughter today.         
02-Sep-2022

## 2022-09-02 NOTE — DISCHARGE NOTE PROVIDER - NSDCCPCAREPLAN_GEN_ALL_CORE_FT
PRINCIPAL DISCHARGE DIAGNOSIS  Diagnosis: Pulmonary embolism  Assessment and Plan of Treatment: You were placed on Eliquis starter gary due to pulmonary embolism  Take as directed 10mg 2x day for 14 days then 5mg 2x day   Make sure you continue this medication as directed   Observe for any s/s bleeding while on Eliquis      SECONDARY DISCHARGE DIAGNOSES  Diagnosis: Rheumatoid arthritis  Assessment and Plan of Treatment: Follow up with your Rheumatologist regarding Enbral    Diagnosis: HTN (hypertension)  Assessment and Plan of Treatment: Continue blood pressure medication regimen as directed. Monitor for any visual changes, headaches or dizziness.  Monitor blood pressure regularly.  Follow up with your PCP for further management for high blood pressure.      Diagnosis: Hyperlipidemia  Assessment and Plan of Treatment: Continue simvastatin   Low fat/cholesterol diet    Diagnosis: At risk for secondary malignancy  Assessment and Plan of Treatment: Follow up with Pulmonology office regarding follow up appointment to get results   we have asked themt to reach out to you for an appointment  [phone] is the office # if you do not get a call     PRINCIPAL DISCHARGE DIAGNOSIS  Diagnosis: Pulmonary embolism  Assessment and Plan of Treatment: You were placed on Eliquis starter gary due to pulmonary embolism  Take as directed 10mg 2x day for 14 doses then 5mg 2x day   Make sure you continue this medication as directed   Observe for any s/s bleeding while on Eliquis      SECONDARY DISCHARGE DIAGNOSES  Diagnosis: Rheumatoid arthritis  Assessment and Plan of Treatment: Follow up with your Rheumatologist regarding Enbral    Diagnosis: HTN (hypertension)  Assessment and Plan of Treatment: Continue blood pressure medication regimen as directed. Monitor for any visual changes, headaches or dizziness.  Monitor blood pressure regularly.  Follow up with your PCP for further management for high blood pressure.      Diagnosis: Hyperlipidemia  Assessment and Plan of Treatment: Continue simvastatin   Low fat/cholesterol diet    Diagnosis: At risk for secondary malignancy  Assessment and Plan of Treatment: Follow up with Pulmonology office regarding follow up appointment to get results   we have asked themt to reach out to you for an appointment  [phone] is the office # if you do not get a call

## 2022-09-02 NOTE — DISCHARGE NOTE PROVIDER - NSDCMRMEDTOKEN_GEN_ALL_CORE_FT
acetaminophen 325 mg oral tablet: 2 tab(s) orally every 6 hours, As needed, Temp greater or equal to 38C (100.4F), Mild Pain (1 - 3)  Eliquis Starter Pack for Treatment of DVT and PE 5 mg oral tablet: 2 tab(s) orally 2 times a day x 14 days   then 5mg po daily 2x day   Please use coupon for free medication   Enbrel 25 mg/0.5 mL subcutaneous solution: 1 milliliter(s) subcutaneous once a week  folic acid 1 mg oral tablet: 1 tab(s) orally once a day  hydroxychloroquine 200 mg oral tablet: 1 tab(s) orally once a day  melatonin 3 mg oral tablet: 1 tab(s) orally once a day (at bedtime), As needed, Insomnia  Norvasc 2.5 mg oral tablet: 1 tab(s) orally once a day  polyethylene glycol 3350 oral powder for reconstitution: 17 gram(s) orally once a day  senna leaf extract oral tablet: 2 tab(s) orally once a day (at bedtime)  Zocor 40 mg oral tablet: 1 tab(s) orally once a day (at bedtime)   acetaminophen 325 mg oral tablet: 2 tab(s) orally every 6 hours, As needed, Temp greater or equal to 38C (100.4F), Mild Pain (1 - 3)  Enbrel 25 mg/0.5 mL subcutaneous solution: 1 milliliter(s) subcutaneous once a week  folic acid 1 mg oral tablet: 1 tab(s) orally once a day  hydroxychloroquine 200 mg oral tablet: 1 tab(s) orally once a day  melatonin 3 mg oral tablet: 1 tab(s) orally once a day (at bedtime), As needed, Insomnia  Norvasc 2.5 mg oral tablet: 1 tab(s) orally once a day  polyethylene glycol 3350 oral powder for reconstitution: 17 gram(s) orally once a day  senna leaf extract oral tablet: 2 tab(s) orally once a day (at bedtime)  Zocor 40 mg oral tablet: 1 tab(s) orally once a day (at bedtime)

## 2022-09-02 NOTE — DISCHARGE NOTE NURSING/CASE MANAGEMENT/SOCIAL WORK - NSDCVIVACCINE_GEN_ALL_CORE_FT
Refill Approved    Rx renewed per Medication Renewal Policy. Medication was last renewed on 10/3/119.    Madhavi Stallings, [facility] Triage/Med Refill 10/9/2020     Requested Prescriptions   Pending Prescriptions Disp Refills     blood glucose test (ACCU-CHEK SMARTVIEW TEST STRIP) strips [Pharmacy Med Name: ACCU-CHEK SMARTVIEW TEST STRIP] 100 strip PRN     Sig: USE TO CHECK BLOOD SUGARS DAILY AS DIRECTED       Diabetic Supplies Refill Protocol Passed - 10/7/2020  7:19 AM        Passed - Visit with PCP or prescribing provider visit in last 6 months     Last office visit with prescriber/PCP: 5/16/2019 Kamaljit Plata MD OR same dept: Visit date not found OR same specialty: 7/23/2020 Aakash García MD  Last physical: 10/23/2017 Last MTM visit: Visit date not found   Next visit within 3 mo: Visit date not found  Next physical within 3 mo: Visit date not found  Prescriber OR PCP: Kamaljit Plata MD  Last diagnosis associated with med order: 1. Uncontrolled type 2 diabetes mellitus with complication, without long-term current use of insulin (H)  - ACCU-CHEK SMARTVIEW TEST STRIP strips [Pharmacy Med Name: ACCU-CHEK SMARTVIEW TEST STRIP]; USE TO CHECK BLOOD SUGARS DAILY AS DIRECTED  Dispense: 100 strip; Refill: PRN    If protocol passes may refill for 12 months if within 3 months of last provider visit (or a total of 15 months).             Passed - A1C in last 6 months     Hemoglobin A1c   Date Value Ref Range Status   07/13/2020 7.9 (H) <=5.6 % Final     Comment:     Prediabetes:   HBA1c       5.7 to 6.4%        Diabetes:        HBA1c        >=6.5%   Patients with Hgb F >5%, total bilirubin >10.0 mg/dL, abnormal red cell turnover, severe renal or hepatic disease or malignancy should not have this A1C method used to diagnose or monitor diabetes.                                   No Vaccines Administered.

## 2022-09-02 NOTE — DISCHARGE NOTE PROVIDER - NSDCFUADDAPPT_GEN_ALL_CORE_FT
Pulmonology office should call you with your follow up appointment   If you do not hear from them you can call the office to speak with them   [phone]

## 2022-09-02 NOTE — PATIENT PROFILE ADULT - FALL HARM RISK - RISK INTERVENTIONS

## 2022-09-02 NOTE — PROGRESS NOTE ADULT - PROBLEM SELECTOR PLAN 2
-Concerning findings on imaging. Thoracic findings noted on imaging, admitted for diagnostic workup   -S/p EBUS, thora, LN bx.   -Path to be followed as OP.
-Thoracic findings noted on imaging, admitted for diagnostic workup   -S/p EBUS, thora, LN bx.   -Path to be followed  -Further discuss pulm re: plan of care and clearance for DC.   -Will need further OP follow up.

## 2022-09-02 NOTE — DISCHARGE NOTE NURSING/CASE MANAGEMENT/SOCIAL WORK - NSDCPEFALRISK_GEN_ALL_CORE
For information on Fall & Injury Prevention, visit: [email or web address] OR  [email or web address] OR  https://www.cdc.gov/steadi/patient.html

## 2022-09-02 NOTE — PROGRESS NOTE ADULT - PROBLEM SELECTOR PLAN 6
DVT ppx- heparin drip - test Rx for Eliquis for DC.    Dispo- Pending MRI of brain - anticipate DC home pending course. If no findings on MRI - possible this afternoon.
DVT ppx- Will await pulm clearance to resume AC.       Dispo- Pending pulm clearance.

## 2022-09-02 NOTE — PROGRESS NOTE ADULT - ASSESSMENT
82yo F w/ history of RA, HTN, and HLD coming in for outpatient CT that showed acute segmental PE in [place], pericardial effusion and findings concerning for a neoplastic process by Dr. Chiu. Patient c/o left upper abdominal pain with findings of new left sided pleural effusion, pleural nodularity, thoracic adenopathy, and possible extrathoracic metastatic disease.
80 yo F pmhx RA, HTN presenting with right upper quadrant pain found to have lymph adenopathy and pleural effusion. IP consulted for diagnostic and therapeutic procedure. CT scan findings of PE and pericardial effusion as well    #Pulmonary Embolism  #Pleural Effusion  # Pleural Nodules  # Thoracic adenopathy    Recommendations:  - Heparin gtt for PE, currently being held prior to procedure  - Cardiology evaluation on 8/31, okay to safely proceed with planned procedure   - TTE for cardiac evaluation  - Plan today, 9/1 for diagnostic bronchoscopy, ebus guided terrie sampling, possible left sided thoracentesis.   - labs reviewed, continue with NPO   - Monitor on tele     
82 yo F pmhx RA, HTN presenting with right upper quadrant pain found to have lymph adenopathy and pleural effusion. IP consulted for diagnostic and therapeutic procedure. CT scan findings of PE and pericardial effusion as well    #Pulmonary Embolism  #Pleural Effusion  # Pleural Nodules  # Thoracic adenopathy    Recommendations:  - s/p bronchoscopy on  with  Endobronchial Ultrasound LN biopsy, APC assisted ablation and hemostasis  - Left sided ultrasound guided thoracentesis on  with 650cc's of fluid drained --> exudative effusion  - Can transition to DOAC/Lovenox per primary team for PE if no hemoptysis or bleeding  - Recommend brain MRI w/ wo contrast for CNS eval  - Oncology follow up outpatient after biopsy results return  - Pulmonary follow up in 2-3 weeks for monitoring and assess for pleural fluid recurrence  - Prior to discharge:  Please email: [email or web address] to setup an appointment prior to discharge. Include the patient's name, , MRN and contact information in the email.      P[facility]  [address] [place] [address]  [phone]      
82yo F w/ history of RA, HTN, and HLD coming in for outpatient CT that showed acute segmental PE in [place], pericardial effusion and findings concerning for a neoplastic process by Dr. Chiu. Patient c/o left upper abdominal pain with findings of new left sided pleural effusion, pleural nodularity, thoracic adenopathy, and possible extrathoracic metastatic disease. Patient is s/p thoracentesis, EBUS, bronch. Path sent. Stable post-procedure. Awaiting MRI for staging. Dispo planning.

## 2022-09-02 NOTE — PROGRESS NOTE ADULT - ATTENDING COMMENTS
s/p flexible bronchoscopy with EBUS and APC ablation of mucosal infiltration as well as left sided thoracentesis.  Pleural fluid is exudative.   Pending MRI Brain.  Outpatient follow up in 2 - 3 weeks for follow up results.

## 2022-09-06 VITALS
WEIGHT: 112 LBS | HEIGHT: 60 IN | HEART RATE: 88 BPM | BODY MASS INDEX: 21.99 KG/M2 | TEMPERATURE: 98 F | OXYGEN SATURATION: 99 % | DIASTOLIC BLOOD PRESSURE: 68 MMHG | RESPIRATION RATE: 16 BRPM | SYSTOLIC BLOOD PRESSURE: 124 MMHG

## 2022-09-06 NOTE — REVIEW OF SYSTEMS
[Chest Pain] : chest pain [Nocturia] : nocturia [Joint Pain] : joint pain [Negative] : Heme/Lymph [FreeTextEntry6] : pleuritic chest pain

## 2022-09-06 NOTE — HEALTH RISK ASSESSMENT
[Never] : Never [No] : No [No falls in past year] : Patient reported no falls in the past year [0] : 2) Feeling down, depressed, or hopeless: Not at all (0) [PHQ-2 Negative - No further assessment needed] : PHQ-2 Negative - No further assessment needed [[id number]] : 0

## 2022-09-06 NOTE — ASSESSMENT
[FreeTextEntry1] : Physical examination reveals a well-developed elderly woman in no acute distress at rest temperature 98 °F blood pressure 124/68 heart rate of 88 respiratory rate of 16 oxygen saturation on room air 99% HEENT was unremarkable chest was significant for rales noted in the left lower lobe with decreased breath sounds questionable effusion at base of left lung cardiovascular was regular abdomen soft extremities showed no edema neurologic exam was nonfocal patient comes to me as a new patient with symptoms that have not been correct directed previously concerned about pulmonary embolus or abnormality in the lung she will go immediately to the emergency room for evaluation further work-up and treatment depends on the results patient and  are in agreement to go there immediately

## 2022-09-06 NOTE — PHYSICAL EXAM
[No Acute Distress] : no acute distress [Well Nourished] : well nourished [Well Developed] : well developed [Well-Appearing] : well-appearing [Normal Sclera/Conjunctiva] : normal sclera/conjunctiva [PERRL] : pupils equal round and reactive to light [EOMI] : extraocular movements intact [Normal Outer Ear/Nose] : the outer ears and nose were normal in appearance [Normal Oropharynx] : the oropharynx was normal [No JVD] : no jugular venous distention [No Lymphadenopathy] : no lymphadenopathy [Supple] : supple [Thyroid Normal, No Nodules] : the thyroid was normal and there were no nodules present [No Respiratory Distress] : no respiratory distress  [No Accessory Muscle Use] : no accessory muscle use [Normal Rate] : normal rate  [Regular Rhythm] : with a regular rhythm [Normal S1, S2] : normal S1 and S2 [No Murmur] : no murmur heard [No Carotid Bruits] : no carotid bruits [No Abdominal Bruit] : a ~M bruit was not heard ~T in the abdomen [No Varicosities] : no varicosities [Pedal Pulses Present] : the pedal pulses are present [No Edema] : there was no peripheral edema [No Palpable Aorta] : no palpable aorta [No Extremity Clubbing/Cyanosis] : no extremity clubbing/cyanosis [Soft] : abdomen soft [Non Tender] : non-tender [Non-distended] : non-distended [No Masses] : no abdominal mass palpated [No HSM] : no HSM [Normal Bowel Sounds] : normal bowel sounds [Normal Posterior Cervical Nodes] : no posterior cervical lymphadenopathy [Normal Anterior Cervical Nodes] : no anterior cervical lymphadenopathy [No CVA Tenderness] : no CVA  tenderness [No Spinal Tenderness] : no spinal tenderness [No Joint Swelling] : no joint swelling [Grossly Normal Strength/Tone] : grossly normal strength/tone [No Rash] : no rash [Coordination Grossly Intact] : coordination grossly intact [No Focal Deficits] : no focal deficits [Normal Gait] : normal gait [Deep Tendon Reflexes (DTR)] : deep tendon reflexes were 2+ and symmetric [Normal Affect] : the affect was normal [Normal Insight/Judgement] : insight and judgment were intact [de-identified] : rales left lower lung

## 2022-09-06 NOTE — HISTORY OF PRESENT ILLNESS
[Spouse] : spouse [FreeTextEntry1] : 81-year-old woman comes to the office as a new patient for a comprehensive physical examination to review her medications and discuss her overall health recently with chest discomfort [de-identified] : Comes to the office for comprehensive physical examination with a history of rheumatoid arthritis cervical radiculitis osteoarthritis of the shoulders and osteoporosis review of systems is significant for nonexertional chest discomfort worse with inspiration mostly on the left side nocturia and joint pains from rheumatoid arthritis remaining review of symptoms is noncontributory she has had no acid reflux has been recently treated for such with no improvement

## 2022-09-07 LAB
NON-GYNECOLOGICAL CYTOLOGY STUDY: SIGNIFICANT CHANGE UP
NON-GYNECOLOGICAL CYTOLOGY STUDY: SIGNIFICANT CHANGE UP

## 2022-09-09 PROBLEM — I10 ESSENTIAL (PRIMARY) HYPERTENSION: Chronic | Status: ACTIVE | Noted: 2022-08-26

## 2022-09-09 PROBLEM — E78.5 HYPERLIPIDEMIA, UNSPECIFIED: Chronic | Status: ACTIVE | Noted: 2022-08-31

## 2022-09-09 PROBLEM — M06.9 RHEUMATOID ARTHRITIS, UNSPECIFIED: Chronic | Status: ACTIVE | Noted: 2022-08-26

## 2022-09-12 ENCOUNTER — APPOINTMENT (OUTPATIENT)
Dept: NUCLEAR MEDICINE | Facility: CLINIC | Age: 82
End: 2022-09-12

## 2022-09-12 ENCOUNTER — OUTPATIENT (OUTPATIENT)
Dept: OUTPATIENT SERVICES | Facility: HOSPITAL | Age: 82
LOS: 1 days | End: 2022-09-12
Payer: MEDICARE

## 2022-09-12 ENCOUNTER — APPOINTMENT (OUTPATIENT)
Dept: MRI IMAGING | Facility: CLINIC | Age: 82
End: 2022-09-12

## 2022-09-12 DIAGNOSIS — Z90.89 ACQUIRED ABSENCE OF OTHER ORGANS: Chronic | ICD-10-CM

## 2022-09-12 DIAGNOSIS — Z00.8 ENCOUNTER FOR OTHER GENERAL EXAMINATION: ICD-10-CM

## 2022-09-12 DIAGNOSIS — C34.92 MALIGNANT NEOPLASM OF UNSPECIFIED PART OF LEFT BRONCHUS OR LUNG: ICD-10-CM

## 2022-09-12 DIAGNOSIS — J18.1 LOBAR PNEUMONIA, UNSPECIFIED ORGANISM: ICD-10-CM

## 2022-09-12 PROCEDURE — 70553 MRI BRAIN STEM W/O & W/DYE: CPT

## 2022-09-12 PROCEDURE — 70553 MRI BRAIN STEM W/O & W/DYE: CPT | Mod: 26

## 2022-09-12 PROCEDURE — 78815 PET IMAGE W/CT SKULL-THIGH: CPT | Mod: 26,PI

## 2022-09-12 PROCEDURE — A9585: CPT

## 2022-09-12 PROCEDURE — A9552: CPT

## 2022-09-12 PROCEDURE — 78815 PET IMAGE W/CT SKULL-THIGH: CPT

## 2022-09-16 ENCOUNTER — APPOINTMENT (OUTPATIENT)
Dept: PULMONOLOGY | Facility: CLINIC | Age: 82
End: 2022-09-16

## 2022-09-16 VITALS
BODY MASS INDEX: 21.2 KG/M2 | TEMPERATURE: 96.5 F | HEART RATE: 90 BPM | RESPIRATION RATE: 17 BRPM | DIASTOLIC BLOOD PRESSURE: 85 MMHG | SYSTOLIC BLOOD PRESSURE: 136 MMHG | WEIGHT: 108 LBS | OXYGEN SATURATION: 100 % | HEIGHT: 60 IN

## 2022-09-16 DIAGNOSIS — K29.70 GASTRITIS, UNSPECIFIED, W/OUT BLEEDING: ICD-10-CM

## 2022-09-16 PROCEDURE — 99214 OFFICE O/P EST MOD 30 MIN: CPT

## 2022-09-16 NOTE — HISTORY OF PRESENT ILLNESS
[TextBox_4] : Mrs. 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 She had recent EBUS/TBNA and thoracentesis on 9/1/22. Pathology with non-small cell lung adenocarcinoma, molecular studies pending. She lost about 8-10 lbs in the past few months. Denies dyspnea at rest or with regular exertion. She does report dyspnea going up a flight of stairs. Has mild cough, but not bothersome. No hemoptysis. Denies chest congestion. Reports significant epigastric pain that is worse with eating. Also with associated nausea and loss of appetite with about 8-10 lb weight loss in the past few months, but reports improved appetite currently. ROS positive for sleep-maintenance insomnia. Goes to sleep at 11 pm, but wakes up at 1-2 am with anxiety.\par \par MRI Brain (Sept 2022) with no evidence of metastatic disease. \par \par PET/CT (Sept 2022) with FDG avid mass in the left lower lung likely corresponding to the newly diagnosed adenocarcinoma. FDG avid nodule in the lingula, satellite lesion versus metastasis.  FDG avid nodular opacities in the right middle and right lower lobes; differential includes infectious and metastatic disease. FDG avid pleural thickening in the left lower lung, suspicious for metastasis. Multiple FDG avid mediastinal, and bilateral hilar lymph nodes compatible with metastases. FDG avid bilateral supraclavicular lymph nodes suspicious for additional metastatic lesions. The left supraclavicular lymph node is amenable to ultrasound-guided FNA biopsy as indicated. Scattered mildly FDG avid osseous foci in the axial skeleton and right proximal femur, concerning for metastases. FDG avid low-attenuation lesion within the enlarged left thyroid lobe. Differential includes benign and malignant etiologies. This may be further evaluated with ultrasound and possible FNA biopsy. Nonspecific diffuse gastric hypermetabolism without CT correlate. Please correlate clinically for gastritis.

## 2022-09-16 NOTE — REVIEW OF SYSTEMS
[Recent Wt Loss (___ Lbs)] : ~T recent [unfilled] lb weight loss [Negative] : Endocrine [Cough] : cough [SOB on Exertion] : sob on exertion [Abdominal Pain] : abdominal pain [Nausea] : nausea

## 2022-09-16 NOTE — ASSESSMENT
[FreeTextEntry1] : Mrs. 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 She had recent EBUS/TBNA and thoracentesis on 9/1/22. Pathology with non-small cell lung adenocarcinoma, molecular studies pending. She lost about 8-10 lbs in the past few months. Denies dyspnea at rest or with regular exertion. She does report dyspnea going up a flight of stairs. Has mild cough, but not bothersome. No hemoptysis. Denies chest congestion. Reports significant epigastric pain that is worse with eating. Also with associated nausea and loss of appetite with about 8-10 lb weight loss in the past few months, but reports improved appetite currently. ROS positive for sleep-maintenance insomnia. Goes to sleep at 11 pm, but wakes up at 1-2 am with anxiety.\par \par Assessment:\par Stage IV Lung adenocarcinoma, molecular studies pending\par Malignant left pleural effusion\par Axial and R femoral bone metastases\par Pulmonary emboli\par Rheumatoid arthritis\par Gastritis\par Insomnia\par \par Plan:\par - Follow-up molecular studies of pathology. So far, PD-L1 TPS is 40%\par - Oncology evaluation next week, may require radiation oncology evaluation given bone mets\par - MRI without brain mets\par - PET/CT with spread to supraclavicular lymph nodes, contralateral lung, left pleura, and axial/R femoral bone\par - The left malignant pleural effusion is stable/decreased in size after thoracentesis on 9/1 based on PET/CT from 9/12. If effusion enlarges despite therapy, then may require repeat thoracentesis vs. tunneled indwelling pleural catheter\par - Follow-up with interventional pulmonary next week\par - Continue apixaban for malignancy-associated pulmonary emboli\par - Will have to discuss with oncology regarding stopping Enbrel for rheumatoid arthritis\par - Severe gastritis, start pantoprazole 40 mg bid + sucralfate 1 gm TID AC + QHS\par - Avoid spicy foods/drinks, citrus foods/drinks, tomato-based foods/sauces, caffeine, chocolate, late evening meals, carbonated beverages, alcohol, and fatty foods\par - If abdominal pain/gastritis persists despite above, consider GI evaluation for EGD\par - Start melatonin 5 mg 30-60 minutes before sleeping\par - Consider starting low dose alprazolam for associated anxiety/insomnia\par - She is vaccinated and boosted for COVID-19\par - Follow-up in 1-2 months or sooner prn

## 2022-09-16 NOTE — PHYSICAL EXAM
[No Acute Distress] : no acute distress [Well Nourished] : well nourished [Well Developed] : well developed [Normal Oropharynx] : normal oropharynx [Normal Appearance] : normal appearance [Supple] : supple [No Neck Mass] : no neck mass [No JVD] : no jvd [Normal Rate/Rhythm] : normal rate/rhythm [Normal Pulses] : normal pulses [Normal S1, S2] : normal s1, s2 [No Murmurs] : no murmurs [No Resp Distress] : no resp distress [No Acc Muscle Use] : no acc muscle use [No Abnormalities] : no abnormalities [Benign] : benign [Not Tender] : not tender [Soft] : soft [Normal Gait] : normal gait [No Clubbing] : no clubbing [No Cyanosis] : no cyanosis [No Edema] : no edema [FROM] : FROM [Normal Color/ Pigmentation] : normal color/ pigmentation [No Focal Deficits] : no focal deficits [Oriented x3] : oriented x3 [Normal Affect] : normal affect [TextBox_68] : decreased breath sounds at left lung base

## 2022-09-19 ENCOUNTER — OUTPATIENT (OUTPATIENT)
Dept: OUTPATIENT SERVICES | Facility: HOSPITAL | Age: 82
LOS: 1 days | Discharge: HOME | End: 2022-09-19

## 2022-09-19 ENCOUNTER — APPOINTMENT (OUTPATIENT)
Dept: PULMONOLOGY | Facility: CLINIC | Age: 82
End: 2022-09-19

## 2022-09-19 VITALS
SYSTOLIC BLOOD PRESSURE: 122 MMHG | BODY MASS INDEX: 20.62 KG/M2 | DIASTOLIC BLOOD PRESSURE: 71 MMHG | HEART RATE: 91 BPM | OXYGEN SATURATION: 97 % | WEIGHT: 105 LBS | TEMPERATURE: 97.2 F | HEIGHT: 60 IN

## 2022-09-19 DIAGNOSIS — Z90.89 ACQUIRED ABSENCE OF OTHER ORGANS: Chronic | ICD-10-CM

## 2022-09-19 DIAGNOSIS — C34.90 MALIGNANT NEOPLASM OF UNSPECIFIED PART OF UNSPECIFIED BRONCHUS OR LUNG: ICD-10-CM

## 2022-09-19 PROCEDURE — 76604 US EXAM CHEST: CPT

## 2022-09-19 PROCEDURE — 99214 OFFICE O/P EST MOD 30 MIN: CPT | Mod: 25

## 2022-09-19 NOTE — REVIEW OF SYSTEMS
[Recent Wt Loss (___ Lbs)] : ~T recent [unfilled] lb weight loss [Cough] : cough [SOB on Exertion] : sob on exertion [Abdominal Pain] : abdominal pain [Nausea] : nausea [Negative] : Endocrine

## 2022-09-19 NOTE — ASSESSMENT
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 She had recent EBUS/TBNA and thoracentesis on 9/1/22. Pathology with non-small cell lung adenocarcinoma, molecular studies pending. \par \par Patient evaluated in office for recurrence of pleural effusion. Thoracic ultrasound notes small to moderate amount of simple pleural effusion, not large enough to warrant drainage. Currently symptoms are stable. She did have symptomatic improvement after her drainage in the hospital. The etiology of the effusion is related to the underlying malignancy. Since there was symptomatic improvement after large volume thoracentesis. In view of patient’s symptoms and given patient’s performance status, we also discussed benefits, risks and alternative ways to palliate dyspnea including serial thoracenteses and placement of a tunneled pleural catheter/pleurx if there was a recurrence and she gets symptomatic again. Patient and family showed good understanding of the indications, contraindications, risks and benefits of these interventions. A shared decision was taken to proceed with TIPC/pleurx placement if reaccumulation of fluid was noted. \par \par Currently we will continue clinicoradiographic follow up. She is due to see oncology soon to discuss disease directed therapy. Advised patient's  to contact us immediately if worsening symptoms noted prior to next appointment. \par \par \par \par \par \par \par Plan to follow up in 3 weeks or sooner if symptoms worsen. At that time will ultrasound again to assess for pocket for thoracentesis vs PleurX catheter.

## 2022-09-19 NOTE — PROCEDURE
[Thoracic Ultrasound] : Thoracic Ultrasound [Simple] : simple [Lung sliding] : Lung sliding: Yes [Normal] : Normal [Pleural Effusion] : Pleural Effusion: No [Consolidation] : Consolidation: No [de-identified] : Dyspnea [FreeTextEntry2] : Small to moderate left effusion, not large enough for tunneled catheter placement at this time

## 2022-09-19 NOTE — HISTORY OF PRESENT ILLNESS
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 She had recent EBUS/TBNA and thoracentesis on 9/1/22. Pathology with non-small cell lung adenocarcinoma, molecular studies pending, Stage IV with malignant pleural effusion,.\par \par PET/CT (Sept 2022) with FDG avid mass in the left lower lung likely corresponding to the newly diagnosed adenocarcinoma. FDG avid nodule in the lingula, satellite lesion versus metastasis. FDG avid nodular opacities in the right middle and right lower lobes; differential includes infectious and metastatic disease. FDG avid pleural thickening in the left lower lung, suspicious for metastasis. Multiple FDG avid mediastinal, and bilateral hilar lymph nodes compatible with metastases. FDG avid bilateral supraclavicular lymph nodes suspicious for additional metastatic lesions. The left supraclavicular lymph node is amenable to ultrasound-guided FNA biopsy as indicated. Scattered mildly FDG avid osseous foci in the axial skeleton and right proximal femur, concerning for metastases. FDG avid low-attenuation lesion within the enlarged left thyroid lobe. Differential includes benign and malignant etiologies. This may be further evaluated with ultrasound and possible FNA biopsy. Nonspecific diffuse gastric hypermetabolism without CT correlate. Please correlate clinically for gastritis. \par \par In terms of her symptoms, both patient and  endorsed that her symptoms of dyspnea did improve after the thoracentesis. Currently she feels well. She does have an decreased appetite, though that has improved with treatment by Dr. Chiu. We are still awaiting results of the molecular analysis. She is due to see Dr. Persaud this Friday. Presents today for evaluation and follow up of the effusion and establish care.

## 2022-09-19 NOTE — PHYSICAL EXAM
[No Acute Distress] : no acute distress [Normal Oropharynx] : normal oropharynx [Normal Appearance] : normal appearance [No Neck Mass] : no neck mass [Normal Rate/Rhythm] : normal rate/rhythm [Normal S1, S2] : normal s1, s2 [No Murmurs] : no murmurs [No Resp Distress] : no resp distress [No Acc Muscle Use] : no acc muscle use [Clear to Auscultation Bilaterally] : clear to auscultation bilaterally [No Abnormalities] : no abnormalities [Benign] : benign [Normal Gait] : normal gait [No Clubbing] : no clubbing [No Cyanosis] : no cyanosis [No Edema] : no edema [FROM] : FROM [TextBox_68] : Reduce breath sounds at left base

## 2022-09-23 ENCOUNTER — NON-APPOINTMENT (OUTPATIENT)
Age: 82
End: 2022-09-23

## 2022-09-23 ENCOUNTER — RESULT REVIEW (OUTPATIENT)
Age: 82
End: 2022-09-23

## 2022-09-23 ENCOUNTER — APPOINTMENT (OUTPATIENT)
Dept: HEMATOLOGY ONCOLOGY | Facility: CLINIC | Age: 82
End: 2022-09-23

## 2022-09-23 ENCOUNTER — APPOINTMENT (OUTPATIENT)
Dept: GASTROENTEROLOGY | Facility: CLINIC | Age: 82
End: 2022-09-23

## 2022-09-23 VITALS
HEIGHT: 59.06 IN | RESPIRATION RATE: 16 BRPM | OXYGEN SATURATION: 97 % | DIASTOLIC BLOOD PRESSURE: 79 MMHG | BODY MASS INDEX: 22.09 KG/M2 | TEMPERATURE: 97.7 F | HEART RATE: 85 BPM | WEIGHT: 109.57 LBS | SYSTOLIC BLOOD PRESSURE: 145 MMHG

## 2022-09-23 VITALS
HEART RATE: 80 BPM | SYSTOLIC BLOOD PRESSURE: 139 MMHG | HEIGHT: 59 IN | DIASTOLIC BLOOD PRESSURE: 79 MMHG | WEIGHT: 109 LBS | OXYGEN SATURATION: 94 % | TEMPERATURE: 97.6 F | BODY MASS INDEX: 21.97 KG/M2

## 2022-09-23 DIAGNOSIS — Z86.79 PERSONAL HISTORY OF OTHER DISEASES OF THE CIRCULATORY SYSTEM: ICD-10-CM

## 2022-09-23 DIAGNOSIS — E78.00 PURE HYPERCHOLESTEROLEMIA, UNSPECIFIED: ICD-10-CM

## 2022-09-23 LAB
BASOPHILS # BLD AUTO: 0.05 K/UL — SIGNIFICANT CHANGE UP (ref 0–0.2)
BASOPHILS NFR BLD AUTO: 1 % — SIGNIFICANT CHANGE UP (ref 0–2)
EOSINOPHIL # BLD AUTO: 0.08 K/UL — SIGNIFICANT CHANGE UP (ref 0–0.5)
EOSINOPHIL NFR BLD AUTO: 1.6 % — SIGNIFICANT CHANGE UP (ref 0–6)
HCT VFR BLD CALC: 36.3 % — SIGNIFICANT CHANGE UP (ref 34.5–45)
HGB BLD-MCNC: 11.4 G/DL — LOW (ref 11.5–15.5)
IMM GRANULOCYTES NFR BLD AUTO: 0 % — SIGNIFICANT CHANGE UP (ref 0–0.9)
LYMPHOCYTES # BLD AUTO: 0.88 K/UL — LOW (ref 1–3.3)
LYMPHOCYTES # BLD AUTO: 17.3 % — SIGNIFICANT CHANGE UP (ref 13–44)
MCHC RBC-ENTMCNC: 29.5 PG — SIGNIFICANT CHANGE UP (ref 27–34)
MCHC RBC-ENTMCNC: 31.4 G/DL — LOW (ref 32–36)
MCV RBC AUTO: 94 FL — SIGNIFICANT CHANGE UP (ref 80–100)
MONOCYTES # BLD AUTO: 0.4 K/UL — SIGNIFICANT CHANGE UP (ref 0–0.9)
MONOCYTES NFR BLD AUTO: 7.8 % — SIGNIFICANT CHANGE UP (ref 2–14)
NEUTROPHILS # BLD AUTO: 3.69 K/UL — SIGNIFICANT CHANGE UP (ref 1.8–7.4)
NEUTROPHILS NFR BLD AUTO: 72.3 % — SIGNIFICANT CHANGE UP (ref 43–77)
NRBC # BLD: 0 /100 WBCS — SIGNIFICANT CHANGE UP (ref 0–0)
PLATELET # BLD AUTO: 301 K/UL — SIGNIFICANT CHANGE UP (ref 150–400)
RBC # BLD: 3.86 M/UL — SIGNIFICANT CHANGE UP (ref 3.8–5.2)
RBC # FLD: 12.4 % — SIGNIFICANT CHANGE UP (ref 10.3–14.5)
WBC # BLD: 5.1 K/UL — SIGNIFICANT CHANGE UP (ref 3.8–10.5)
WBC # FLD AUTO: 5.1 K/UL — SIGNIFICANT CHANGE UP (ref 3.8–10.5)

## 2022-09-23 PROCEDURE — 99205 OFFICE O/P NEW HI 60 MIN: CPT

## 2022-09-23 PROCEDURE — 93010 ELECTROCARDIOGRAM REPORT: CPT

## 2022-09-23 PROCEDURE — 99204 OFFICE O/P NEW MOD 45 MIN: CPT

## 2022-09-23 NOTE — HISTORY OF PRESENT ILLNESS
[Disease: _____________________] : Disease: [unfilled] [T: ___] : T[unfilled] [N: ___] : N[unfilled] [M: ___] : M[unfilled] [AJCC Stage: ____] : AJCC Stage: [unfilled] [de-identified] : 81 year-old female with med hx of RA (Enbrel and hydroxycholorquine), osteoporosis (on prolia), dyslipidemia, started expereincing GI issues since COVID infection in May 2022. Went to PCP who referred her to ER for rt UQ pain- with concern of GB issues. CT scan at that time showed suspicious findings in the lung. Additional work up as below: \par \par CT chest A/P: LLL mass with mets to right lung, left pleura, bone (thoracic spine & R proximal femur), malignant left pleural effusion s/p thoracentesis (9/1/22), PE was also noted. She had recent EBUS/TBNA and thoracentesis on 9/1/22. Pathology with non-small cell lung adenocarcinoma, stage IV with malignant pleural effusion, lung primary\par \par PET/CT (Sept 2022) with FDG avid mass in the left lower lung likely corresponding to the newly diagnosed adenocarcinoma. FDG avid nodule in the lingula, satellite lesion versus metastasis. FDG avid nodular opacities in the right middle and right lower lobes; differential includes infectious and metastatic disease. FDG avid pleural thickening in the left lower lung, suspicious for metastasis. Multiple FDG avid mediastinal, and bilateral hilar lymph nodes compatible with metastases. FDG avid bilateral supraclavicular lymph nodes suspicious for additional metastatic lesions. The left supraclavicular lymph node is amenable to ultrasound-guided FNA biopsy as indicated. Scattered mildly FDG avid osseous foci in the axial skeleton and right proximal femur, concerning for metastases. FDG avid low-attenuation lesion within the enlarged left thyroid lobe. Differential includes benign and malignant etiologies. This may be further evaluated with ultrasound and possible FNA biopsy. Nonspecific diffuse gastric hypermetabolism without CT correlate. Please correlate clinically for gastritis. \par \par In terms of her symptoms, both patient and  endorsed that her symptoms of dyspnea did improve after the thoracentesis. Currently she feels well. She does have an decreased appetite and ongoing GI issues which have improved with PPI.  [de-identified] : adeno ca

## 2022-09-23 NOTE — HISTORY OF PRESENT ILLNESS
[FreeTextEntry1] : Deena Braxton is a 81-year-old pleasant young woman brought in by her son and  for consultation of abnormal PET CT scan showing gastritis, however the CT scan did not show any structural abnormality in the corresponding areas of stomach.  4 months ago she was diagnosed to have stage IV lung cancer metastasizing to the other lung and spine resulting in pathological fracture and pleural effusion.She has PE and was started on Eliquis .\par She was complaining of epigastric abdominal discomfort but no nausea or vomiting or melena.  She denied any rectal bleeding or change in bowel habits.  Her appetite has been stable.  She denied taking NSAIDs, was taking only Tylenol for spine related pain.  She denied smoking and alcohol abuse in the past.\par She was prescribed  tyrosine kinase inhibitors today for her lung cancer.

## 2022-09-23 NOTE — ASSESSMENT
[FreeTextEntry1] : Stage IV metastatic lung cancer with abnormal imaging study showing gastritis with no structural abnormality.\par Will manage conservatively with pantoprazole in the morning before breakfast and will prescribe famotidine at bedtime.  Advised her she could take sucralfate as needed basis as a rescue.  Advised dietary changes.  Explained the risk of endoscopic confirmation.  Status of H. pylori and confirmation test were also brought up in the discussion, but suggested not to pursue .

## 2022-09-23 NOTE — REVIEW OF SYSTEMS
[Fatigue] : fatigue [Recent Change In Weight] : ~T recent weight change [Cough] : cough [SOB on Exertion] : shortness of breath during exertion [Anxiety] : anxiety [Depression] : depression [Negative] : Allergic/Immunologic [FreeTextEntry7] : GERD-like symptoms [de-identified] : heavy head [de-identified] : pt marked high on depression screen- she is not suicidal. Will refer to SW

## 2022-09-23 NOTE — PHYSICAL EXAM
[Alert] : alert [Normal Voice/Communication] : normal voice/communication [Healthy Appearing] : healthy appearing [No Acute Distress] : no acute distress [Sclera] : the sclera and conjunctiva were normal [Hearing Threshold Finger Rub Not [place]] : hearing was normal [Normal Lips/Gums] : the lips and gums were normal [Oropharynx] : the oropharynx was normal [Normal Appearance] : the appearance of the neck was normal [No Neck Mass] : no neck mass was observed [No Respiratory Distress] : no respiratory distress [No Acc Muscle Use] : no accessory muscle use [Respiration, Rhythm And Depth] : normal respiratory rhythm and effort [Auscultation Breath Sounds / Voice Sounds] : lungs were clear to auscultation bilaterally [Heart Rate And Rhythm] : heart rate was normal and rhythm regular [Normal S1, S2] : normal S1 and S2 [Murmurs] : no murmurs [No Edema] : no edema [Bowel Sounds] : normal bowel sounds [Abdomen Tenderness] : non-tender [No Masses] : no abdominal mass palpated [Abdomen Soft] : soft [] : no hepatosplenomegaly [Cervical Lymph Nodes Enlarged Posterior Bilaterally] : no posterior cervical lymphadenopathy [Supraclavicular Lymph Nodes Enlarged Bilaterally] : no supraclavicular lymphadenopathy [Cervical Lymph Nodes Enlarged Anterior Bilaterally] : no anterior cervical lymphadenopathy [Abnormal Walk] : normal gait [No Clubbing, Cyanosis] : no clubbing or cyanosis of the fingernails [Normal Color / Pigmentation] : normal skin color and pigmentation [No Focal Deficits] : no focal deficits [Oriented To Time, Place, And Person] : oriented to person, place, and time

## 2022-09-23 NOTE — PHYSICAL EXAM
[Alert] : alert [Normal Voice/Communication] : normal voice/communication [Healthy Appearing] : healthy appearing [No Acute Distress] : no acute distress [Sclera] : the sclera and conjunctiva were normal [Hearing Threshold Finger Rub Not [place]] : hearing was normal [Normal Lips/Gums] : the lips and gums were normal [Oropharynx] : the oropharynx was normal [Normal Appearance] : the appearance of the neck was normal [No Neck Mass] : no neck mass was observed [No Respiratory Distress] : no respiratory distress [No Acc Muscle Use] : no accessory muscle use [Respiration, Rhythm And Depth] : normal respiratory rhythm and effort [Auscultation Breath Sounds / Voice Sounds] : lungs were clear to auscultation bilaterally [Heart Rate And Rhythm] : heart rate was normal and rhythm regular [Normal S1, S2] : normal S1 and S2 [Murmurs] : no murmurs [No Edema] : no edema [Bowel Sounds] : normal bowel sounds [Abdomen Tenderness] : non-tender [No Masses] : no abdominal mass palpated [Abdomen Soft] : soft [] : no hepatosplenomegaly [Cervical Lymph Nodes Enlarged Posterior Bilaterally] : no posterior cervical lymphadenopathy [Supraclavicular Lymph Nodes Enlarged Bilaterally] : no supraclavicular lymphadenopathy [Cervical Lymph Nodes Enlarged Anterior Bilaterally] : no anterior cervical lymphadenopathy [Abnormal Walk] : normal gait [No Clubbing, Cyanosis] : no clubbing or cyanosis of the fingernails [Normal Color / Pigmentation] : normal skin color and pigmentation [No Focal Deficits] : no focal deficits [Oriented To Time, Place, And Person] : oriented to person, place, and time

## 2022-09-23 NOTE — CONSULT LETTER
[Dear  ___] : Dear  [unfilled], [Consult Letter:] : I had the pleasure of evaluating your patient, [unfilled]. [Please see my note below.] : Please see my note below. [Consult Closing:] : Thank you very much for allowing me to participate in the care of this patient.  If you have any questions, please do not hesitate to contact me. [Sincerely,] : Sincerely, [FreeTextEntry2] : Dr. Axel Dos Santos [FreeTextEntry3] : Gray Persaud MD\par \par

## 2022-09-23 NOTE — REVIEW OF SYSTEMS
[As Noted in HPI] : as noted in HPI [Abdominal Pain] : abdominal pain [Heartburn] : heartburn [Bloating (gassiness)] : bloating [Arthralgias (joint pain)] : arthralgias [Joint Stiffness] : joint stiffness [Negative] : Heme/Lymph [Vomiting] : no vomiting [Diarrhea] : no diarrhea [Melena (black stool)] : no melena [Bleeding] : no bleeding [Fecal Incontinence (soiling)] : no fecal incontinence

## 2022-09-23 NOTE — ASSESSMENT
[FreeTextEntry1] : 80 yo w with some comorbities, including RA, with newly diagnosed lung adeno ca\par Noted notes from pother providers, history obtained in detail, images reviewed independently and so were labs\par Specifically, PET/CT showed FDG avid mass in the left lower lung likely corresponding to the newly diagnosed adenocarcinoma. FDG avid nodule in the lingula, satellite lesion versus metastasis. FDG avid nodular opacities in the right middle and right lower lobes; differential includes infectious and metastatic disease. FDG avid pleural thickening in the left lower lung, suspicious for metastasis. Multiple FDG avid mediastinal, and bilateral hilar lymph nodes compatible with metastases. FDG avid bilateral supraclavicular lymph nodes suspicious for additional metastatic lesions. The left supraclavicular lymph node is amenable to ultrasound-guided FNA biopsy as indicated.  Scattered mildly FDG avid osseous foci in the axial skeleton and right proximal femur, concerning for metastases.  FDG avid low-attenuation lesion within the enlarged left thyroid lobe. Differential includes benign and malignant etiologies. This may be further evaluated with ultrasound and possible FNA biopsy. Nonspecific diffuse gastric hypermetabolism without CT correlate. Please correlate clinically for gastritis.\par \par Brain MRI showed no mets\par PDL1 40% and NGS showed multiple muts, importantly, EGFR exon 18 and exon 20 muts \par We discussed at depth dx of lung adeno ca, tumor genetics and palliative intent of tx\par We went over the three basic options for tx including chemo (pt's underlyuing comorbidities and PS makes this less desirable but can be used in the future), immunotherapy (while PDL1 is 40%, the presence of EGFR and never-smoking status makes this unlikely to be beneficial and moreover, with active RA, toxicity would be of concern as well). That leaves us with targeted tx. Fortunately, there is EGFR mut(s) which based on literature review are sensitive to TKI. These atypical mutations however, have subdued responses to TKI compared to common ones. Nevertheless, this seems to be the best option at this time. \par Of the TKIs, afatinib is approved for rare EGFR muts. However, given severe gastritis, tolerance would be difficult. Osimertinib is the least toxic of all agents (GI toxicity) and may be the best option here\par \par Pt and her family are in agreement\par Will start at 80 mg daily\par Discussed administration, potential AEs, importance of close monitoring with labs, toxicity and efficacy assessment, and EKG while on this high risk medication\par Pt agreed and signed informed consent\par Continue follow up with rheum and pulm\par refer to GI ASAP\par skin care discussed\par Given bone mets, would like dental clearance prior to the same\par All questions answered\par \par \par \par

## 2022-09-23 NOTE — REASON FOR VISIT
spherecylinderaxisaddprismvertexVAInt VANVExaminer [Initial Consultation] : an initial consultation [FreeTextEntry2] : lung cancer

## 2022-09-26 LAB
ALBUMIN SERPL ELPH-MCNC: 4.3 G/DL
ALP BLD-CCNC: 76 U/L
ALT SERPL-CCNC: 14 U/L
ANION GAP SERPL CALC-SCNC: 14 MMOL/L
AST SERPL-CCNC: 17 U/L
BILIRUB SERPL-MCNC: 0.2 MG/DL
BUN SERPL-MCNC: 24 MG/DL
CALCIUM SERPL-MCNC: 9 MG/DL
CEA SERPL-MCNC: 32.2 NG/ML
CHLORIDE SERPL-SCNC: 108 MMOL/L
CO2 SERPL-SCNC: 21 MMOL/L
CREAT SERPL-MCNC: 0.84 MG/DL
EGFR: 70 ML/MIN/1.73M2
GLUCOSE SERPL-MCNC: 101 MG/DL
HBV CORE IGG+IGM SER QL: NONREACTIVE
HBV CORE IGM SER QL: NONREACTIVE
HBV SURFACE AB SER QL: NONREACTIVE
HBV SURFACE AG SER QL: NONREACTIVE
HEPB DNA PCR INT: NOT DETECTED
HEPB DNA PCR LOG: NOT DETECTED LOGIU/ML
POTASSIUM SERPL-SCNC: 4.7 MMOL/L
PROT SERPL-MCNC: 7.8 G/DL
SODIUM SERPL-SCNC: 144 MMOL/L
TSH SERPL-ACNC: 0.74 UIU/ML

## 2022-10-07 ENCOUNTER — RESULT REVIEW (OUTPATIENT)
Age: 82
End: 2022-10-07

## 2022-10-07 ENCOUNTER — APPOINTMENT (OUTPATIENT)
Dept: HEMATOLOGY ONCOLOGY | Facility: CLINIC | Age: 82
End: 2022-10-07

## 2022-10-07 VITALS
OXYGEN SATURATION: 97 % | SYSTOLIC BLOOD PRESSURE: 132 MMHG | WEIGHT: 108.47 LBS | TEMPERATURE: 97 F | DIASTOLIC BLOOD PRESSURE: 78 MMHG | BODY MASS INDEX: 21.91 KG/M2 | RESPIRATION RATE: 16 BRPM | HEART RATE: 81 BPM

## 2022-10-07 LAB
ALBUMIN SERPL ELPH-MCNC: 4 G/DL
ALP BLD-CCNC: 67 U/L
ALT SERPL-CCNC: 13 U/L
ANION GAP SERPL CALC-SCNC: 12 MMOL/L
AST SERPL-CCNC: 17 U/L
BASOPHILS # BLD AUTO: 0.03 K/UL — SIGNIFICANT CHANGE UP (ref 0–0.2)
BASOPHILS NFR BLD AUTO: 0.8 % — SIGNIFICANT CHANGE UP (ref 0–2)
BILIRUB SERPL-MCNC: 0.2 MG/DL
BUN SERPL-MCNC: 31 MG/DL
CALCIUM SERPL-MCNC: 9 MG/DL
CEA SERPL-MCNC: 36.5 NG/ML
CHLORIDE SERPL-SCNC: 104 MMOL/L
CO2 SERPL-SCNC: 24 MMOL/L
CREAT SERPL-MCNC: 1.02 MG/DL
EGFR: 55 ML/MIN/1.73M2
EOSINOPHIL # BLD AUTO: 0.08 K/UL — SIGNIFICANT CHANGE UP (ref 0–0.5)
EOSINOPHIL NFR BLD AUTO: 2.1 % — SIGNIFICANT CHANGE UP (ref 0–6)
GLUCOSE SERPL-MCNC: 95 MG/DL
HCT VFR BLD CALC: 34.8 % — SIGNIFICANT CHANGE UP (ref 34.5–45)
HGB BLD-MCNC: 10.9 G/DL — LOW (ref 11.5–15.5)
IMM GRANULOCYTES NFR BLD AUTO: 0.3 % — SIGNIFICANT CHANGE UP (ref 0–0.9)
LYMPHOCYTES # BLD AUTO: 0.9 K/UL — LOW (ref 1–3.3)
LYMPHOCYTES # BLD AUTO: 23.6 % — SIGNIFICANT CHANGE UP (ref 13–44)
MAGNESIUM SERPL-MCNC: 2.4 MG/DL
MCHC RBC-ENTMCNC: 29.5 PG — SIGNIFICANT CHANGE UP (ref 27–34)
MCHC RBC-ENTMCNC: 31.3 G/DL — LOW (ref 32–36)
MCV RBC AUTO: 94.3 FL — SIGNIFICANT CHANGE UP (ref 80–100)
MONOCYTES # BLD AUTO: 0.34 K/UL — SIGNIFICANT CHANGE UP (ref 0–0.9)
MONOCYTES NFR BLD AUTO: 8.9 % — SIGNIFICANT CHANGE UP (ref 2–14)
NEUTROPHILS # BLD AUTO: 2.45 K/UL — SIGNIFICANT CHANGE UP (ref 1.8–7.4)
NEUTROPHILS NFR BLD AUTO: 64.3 % — SIGNIFICANT CHANGE UP (ref 43–77)
NRBC # BLD: 0 /100 WBCS — SIGNIFICANT CHANGE UP (ref 0–0)
PLATELET # BLD AUTO: 301 K/UL — SIGNIFICANT CHANGE UP (ref 150–400)
POTASSIUM SERPL-SCNC: 4.3 MMOL/L
PROT SERPL-MCNC: 7.7 G/DL
RBC # BLD: 3.69 M/UL — LOW (ref 3.8–5.2)
RBC # FLD: 12.1 % — SIGNIFICANT CHANGE UP (ref 10.3–14.5)
SODIUM SERPL-SCNC: 140 MMOL/L
TSH SERPL-ACNC: 0.75 UIU/ML
WBC # BLD: 3.81 K/UL — SIGNIFICANT CHANGE UP (ref 3.8–10.5)
WBC # FLD AUTO: 3.81 K/UL — SIGNIFICANT CHANGE UP (ref 3.8–10.5)

## 2022-10-07 PROCEDURE — 93010 ELECTROCARDIOGRAM REPORT: CPT

## 2022-10-07 PROCEDURE — 99215 OFFICE O/P EST HI 40 MIN: CPT

## 2022-10-10 ENCOUNTER — APPOINTMENT (OUTPATIENT)
Dept: PULMONOLOGY | Facility: CLINIC | Age: 82
End: 2022-10-10

## 2022-10-10 VITALS
HEIGHT: 59 IN | OXYGEN SATURATION: 92 % | TEMPERATURE: 97 F | SYSTOLIC BLOOD PRESSURE: 127 MMHG | DIASTOLIC BLOOD PRESSURE: 76 MMHG | WEIGHT: 108 LBS | BODY MASS INDEX: 21.77 KG/M2 | HEART RATE: 87 BPM | RESPIRATION RATE: 17 BRPM

## 2022-10-10 DIAGNOSIS — R04.0 EPISTAXIS: ICD-10-CM

## 2022-10-10 DIAGNOSIS — R09.82 POSTNASAL DRIP: ICD-10-CM

## 2022-10-10 DIAGNOSIS — J32.9 CHRONIC SINUSITIS, UNSPECIFIED: ICD-10-CM

## 2022-10-10 PROCEDURE — 99214 OFFICE O/P EST MOD 30 MIN: CPT | Mod: 25

## 2022-10-10 PROCEDURE — 76604 US EXAM CHEST: CPT

## 2022-10-10 NOTE — PHYSICAL EXAM
[Fully active, able to carry on all pre-disease performance without restriction] : Status 0 - Fully active, able to carry on all pre-disease performance without restriction [Thin] : thin [Normal] : grossly intact [de-identified] : Rash on L thigh macula with redness, no vesicles. Rash with clear margins

## 2022-10-10 NOTE — ASSESSMENT
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 \par \par PET/CT (Sept 2022) with FDG avid mass in the left lower lung likely corresponding to the newly diagnosed adenocarcinoma. FDG avid nodule in the lingula, satellite lesion versus metastasis. FDG avid nodular opacities in the right middle and right lower lobes; differential includes infectious and metastatic disease. FDG avid pleural thickening in the left lower lung, suspicious for metastasis. Multiple FDG avid mediastinal, and bilateral hilar lymph nodes compatible with metastases. FDG avid bilateral supraclavicular lymph nodes suspicious for additional metastatic lesions. The left supraclavicular lymph node is amenable to ultrasound-guided FNA biopsy as indicated. Scattered mildly FDG-avid osseous foci in the axial skeleton and right proximal femur, concerning for metastases. FDG avid low-attenuation lesion within the enlarged left thyroid lobe.Nonspecific diffuse gastric hypermetabolism without CT correlate. \par \par Since last visit, pt has seen GI and nausea was thought due to gastritis and was prescribed pantoprazole and famotidine.\par \par Brain MRI showed no mets\par PDL1 40% and NGS showed multiple muts, importantly, EGFR exon 18 and exon 20 muts, both atypical but shown to be responsive to TKI. \par \par 2 weeks ago, patient was started with osimertinib. She has been tolerating osimertinib well, with no drug specific AEs but does note an increase in fatigue. Also discussed with patient and son about L thyroid lobe lesion on imaging, unlikely would change course of treatment so will defer pursing diagnostic tests and imaging at this time.\par \par Due to drugs interaction, we suggested that simvastatin be switched to atorvastatin. Pt will discuss with PCP. \par \par For now, c/w osimertinib\par f/u with rheum\par CBC, CMP, CEA, EKG today\par Scans 8 weeks from start of tx. Will order next visit\par RTC in 1 month \par We also discussed the importance of close monitoring with labs, toxicity and efficacy assessment, and EKG while on this high risk medication. \par \par Case discussed with Dr. Persaud\par \par \par I was with the hematology/ oncology resident, Dr. Finley for the entire visit and agree with above documentation\par \par \par

## 2022-10-10 NOTE — REVIEW OF SYSTEMS
[Skin Rash] : skin rash [Negative] : Musculoskeletal [FreeTextEntry4] : Increased saliva secretion and runny nose [FreeTextEntry7] : Nausea

## 2022-10-10 NOTE — HISTORY OF PRESENT ILLNESS
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 \par \par She  started experiencing GI issues since COVID infection in May 2022. Went to PCP who referred her to ER for RUQ pain- with concern of GB issues. CT scan at that time showed suspicious findings in the lung. Additional work up as below: \par \par CT chest A/P: LLL mass with mets to right lung, left pleura, bone (thoracic spine & R proximal femur), malignant left pleural effusion s/p thoracentesis (9/1/22), PE was also noted. She had recent EBUS/TBNA and thoracentesis on 9/1/22. Pathology with non-small cell lung adenocarcinoma, stage IV with malignant pleural effusion, lung primary\par \par PET/CT (Sept 2022) with FDG avid mass in the left lower lung likely corresponding to the newly diagnosed adenocarcinoma. FDG avid nodule in the lingula, satellite lesion versus metastasis. FDG avid nodular opacities in the right middle and right lower lobes; differential includes infectious and metastatic disease. FDG avid pleural thickening in the left lower lung, suspicious for metastasis. Multiple FDG avid mediastinal, and bilateral hilar lymph nodes compatible with metastases. FDG avid bilateral supraclavicular lymph nodes suspicious for additional metastatic lesions. The left supraclavicular lymph node is amenable to ultrasound-guided FNA biopsy as indicated. Scattered mildly FDG avid osseous foci in the axial skeleton and right proximal femur, concerning for metastases. FDG avid low-attenuation lesion within the enlarged left thyroid lobe. Differential includes benign and malignant etiologies. This may be further evaluated with ultrasound and possible FNA biopsy. Nonspecific diffuse gastric hypermetabolism without CT correlate. \par \par Sep 2022: Started Osimertinb\par \par \par \par \par \par Disease: lung cancer \par Pathology: adeno ca \par TNM stage: T4, N3, M1 \par AJCC Stage: IV \par  [de-identified] : 10/7/22: Since starting osimertinib, patient noticed increased runny nose, saliva secretion and more pronounced fatigue. She saw GI for nausea and was started on additional meds with gastritis. She got started on pantoprazole and famotidine though it only helped improve her nausea slightly. She has mild lower waist pain which is chronic. She doesn't have seasonal allergy. Her fatigue is less in frequency but more pronounced. She has rash on L thigh, thought due to Enbrel injection. She denied abdominal pain, diarrhea, vomiting, fever, chills, cough SOB. She sometimes felt anxious at night. \par

## 2022-10-20 ENCOUNTER — APPOINTMENT (OUTPATIENT)
Dept: HEMATOLOGY ONCOLOGY | Facility: CLINIC | Age: 82
End: 2022-10-20

## 2022-10-21 ENCOUNTER — APPOINTMENT (OUTPATIENT)
Dept: GERIATRICS | Facility: CLINIC | Age: 82
End: 2022-10-21

## 2022-10-21 VITALS
SYSTOLIC BLOOD PRESSURE: 132 MMHG | HEART RATE: 74 BPM | BODY MASS INDEX: 21.01 KG/M2 | HEIGHT: 60 IN | TEMPERATURE: 97.8 F | RESPIRATION RATE: 15 BRPM | DIASTOLIC BLOOD PRESSURE: 66 MMHG | WEIGHT: 107 LBS | OXYGEN SATURATION: 98 %

## 2022-10-21 DIAGNOSIS — Z23 ENCOUNTER FOR IMMUNIZATION: ICD-10-CM

## 2022-10-21 PROCEDURE — 99205 OFFICE O/P NEW HI 60 MIN: CPT

## 2022-10-21 PROCEDURE — 99497 ADVNCD CARE PLAN 30 MIN: CPT

## 2022-10-21 RX ORDER — SIMVASTATIN 40 MG/1
40 TABLET, FILM COATED ORAL
Refills: 0 | Status: DISCONTINUED | COMMUNITY
Start: 2022-08-26 | End: 2022-10-21

## 2022-10-25 ENCOUNTER — NON-APPOINTMENT (OUTPATIENT)
Age: 82
End: 2022-10-25

## 2022-10-25 NOTE — HISTORY OF PRESENT ILLNESS
[Patient reported hearing was normal] : Patient reported hearing was normal [Patient reported vision is normal] : Patient reported vision is normal [Patient reported Patient reported dental screening is normal] : Patient reported dental screening is normal [No falls in past year] : Patient reported no falls in the past year [Completely Independent] : Completely independent. [] : Assistance needed with traveling/transport [Smoke Detector] : smoke detector [Carbon Monoxide Detector] : carbon monoxide detector [Night Light] : night light [0] : 1) Little interest or pleasure doing things: Not at all (0) [1] : 2) Feeling down, depressed, or hopeless for several days (1) [With Patient/Caregiver] : , with patient/caregiver [I will adhere to the patient's wishes.] : I will adhere to the patient's wishes. [Time Spent: ___ minutes] : Time Spent: [unfilled] minutes [FreeTextEntry1] : VIPIN KENDRICK is an 80 yo woman with stage IV NSCLC (on Tagrisso), rheumatoid arthritis, HTN, HLD, osteoporosis, hx of COVID-19 (5/2022) and hx of PE who presents for initial visit to establish care at geriatric practice.. Patient is accompanied by her , Gustavo. \par \par \par Prior PCP:Dr. Quintanilla\par Oncologist: Dr. Persaud\par Rheumatologist: Dr. El \par Pulm: Dr. Dos Santos\par GI: Dr. Fox\par Palliative: Dr. Street\par \par Stage IV NSCLC:\par -non-small cell lung adenocarcinoma of the LLL with mets to right lung, left pleura (thoracic spine & R proximal femur)\par -on osimertinib (Tagrisso)\par -cancer history:\par         -She started experiencing GI issues since COVID infection in May 2022. Went to PCP who referred her to ER for RUQ pain- with concern          of GB issues. CT scan at that time showed suspicious findings in the lung. Additional work up as below: \par         -CT chest A/P: LLL mass with mets to right lung, left pleura, bone (thoracic spine & R proximal femur), malignant left pleural effusion s/p          thoracentesis (9/1/22), PE was also noted. She had recent EBUS/TBNA and thoracentesis on 9/1/22. Pathology with non-small cell lung          adenocarcinoma, stage IV with malignant pleural effusion, lung primary\par         -PET/CT (Sept 2022) with FDG avid mass in the left lower lung likely corresponding to the newly diagnosed adenocarcinoma. FDG avid          nodule in the lingula, satellite lesion versus metastasis. FDG avid nodular opacities in the right middle and right lower lobes; differential          includes infectious and metastatic disease. FDG avid pleural thickening in the left lower lung, suspicious for metastasis. Multiple FDG avid          mediastinal, and bilateral hilar lymph nodes compatible with metastases. FDG avid bilateral supraclavicular lymph nodes suspicious for          additional metastatic lesions. The left supraclavicular lymph node is amenable to ultrasound-guided FNA biopsy as indicated. Scattered          mildly FDG avid osseous foci in the axial skeleton and right proximal femur, concerning for metastases. FDG avid low-attenuation lesion          within the enlarged left thyroid lobe. Differential includes benign and malignant etiologies. This may be further evaluated with ultrasound          and possible FNA biopsy. Nonspecific diffuse gastric hypermetabolism without CT correlate. \par         -Sep 2022: Started osimertinib (Tagrisso)\par \par          Disease: lung cancer \par          Pathology: adeno ca \par          TNM stage: T4, N3, M1 \par          AJCC Stage: IV\par -referred to Dr. Street (Palliative) by Dr. Persaud for symptom management; initial appointment on 10/28\par \par Hx of PE:\par -found during hospitalization in September\par -on eliquis 5 mg bid ; patient in hypercoagulable states due to metastatic disease\par \par Nausea/diminished appetitie:\par -Since starting osimertinib, patient noticed increased runny nose, saliva secretion and more pronounced fatigue\par -runny nose/increased saliva secretion is still present but improved today; fatigue is still main symptom\par -She saw GI for nausea and was started on famotidine and pantoprazole for gastritis which has helped slightly\par -She has mild lower waist pain which is chronic\par -today she states that nausea is worse when presented with food; she has no appetite and states "the sight of food can make me nauseous\par -denies vomiting, abdominal pain, diarrhea, or constipation\par \par Sleep disturbance:\par -states she probably gets about 3 hours of sleep each night\par -getting up frequently to urinate\par -states that every time she gets up, it is very difficult for her to fall back asleep \par \par Depression:\par -no history but states that she has been very sad since learning  about her diagnosis\par -states repeatedly that she has been pretty healthy her whole life and that this has been horrible for her and her family\par -not currently on medication, does not go to therapy\par \par Rheumatoid arthritis:\par -on hydroxychloroquine 200mg qd \par -previously on weekly Enbrel injections; stopped due to contraindication in the setting of immunocompromised patient\par -also had rash on left thigh in early October, through to be from injection\par -occasionally with right shoulder pain; states that she takes Tylenol occasionally which helps\par \par Osteoporosis:\par -on Prolia injections; last received 3 months ago\par -also taking Vitamin D\par \par HLD:\par -on simvastatin 40mg ; patient is also on amlodipine\par -statin should be switched d/t potential interaction\par \par HTN:\par -on amlodipine 2.5mg qd\par \par ACP:\par -no HCP or MOLST in place\par -plan to complete HCP today would want , Gustavo [phone] as primary proxy and sonRichardson [phone] as secondary proxy \par \par Immunizations: need records\par -COVID-19: x2; recommended booster\par -Flu: never received; recommended today especially d/t immunocompromised state but patient defers\par -Shingles:\par -pna:\par -tdap: [TextBox_25] : has OP, on Prolia- last DEXA? [TextBox_43] : follows with [facility] [de-identified] : 6 [FreeTextEntry6] : patient has not driven for the last ~20 years [de-identified] : 7 [de-identified] : no DME [[id number]] : 1 [AdvancecareDate] : 10/22 [FreeTextEntry4] : -HCP form completed today; primary HCP is , Gustavo [phone] and secondary HCP is  son, Richardson [phone] \par -no MOLST in place; plan to open discussion at f/u visit

## 2022-10-25 NOTE — DATA REVIEWED
[FreeTextEntry1] : MR BRAIN  (09/12/2022):\par \par IMPRESSION: No abnormal enhancement suggest intracranial metastasis.\par -------\par PET CT  (09/12/2022): \par \par COMPARISON:  No prior PET/CT.\par \par OTHER STUDIES USED FOR CORRELATION: CT angiogram chest from 8/30/2022 and CT of abdomen and pelvis from 8/26/2022.\par \par FINDINGS:\par \par HEAD/NECK: FDG avid bilateral left greater than right supraclavicular lymph nodes. Reference nodes:\par -Left lateral supraclavicular 1.7 x 1 cm (SUV 13.0; image 55)\par -Left medial supraclavicular 1.5 x 1.2 cm (SUV 14.4; image 64)\par -Right supraclavicular approximately 1.2 x 1 cm (SUV 7.6; image 63)\par FDG avid 1.8 x 1 cm low-attenuation density within the enlarged left thyroid lobe (SUV 8.8; image 64).\par \par Physiologic FDG activity in visualized brain, major salivary glands and neck muscles\par \par CHEST: FDG avid mediastinal and bilateral perihilar lymph nodes, some of which are difficult to delineate. Reference lymph nodes:\par -Right paratracheal approximately 1.8 x 1.6 cm (SUV 11.8; image 82)\par -AP window 1.8 x 1.2 cm (SUV 13.4; image 83).\par -Right anterior mediastinal 1 x 0.6 cm (SUV 9.0; image 83)\par -Left internal mammary 1.5 x 0.6 cm (SUV 12.2; image 92)\par -Subcarinal 2.2 x 1.9 cm (SUV 12.0; image 93)\par -Right perihilar approximately 1.9 x 1.9 cm (SUV 11.5; image 88)\par -Left perihilar, difficult to delineate (SUV 9.3; image 86).\par Several FDG avid subcentimeter left cardiophrenic; reference medially located 0.8 cm lymph node just to the left of the sternum (SUV 11.6; image 106).\par \par LUNGS: Difficult to delineate approximately 5.4 x 5.1 cm left lower lung mass with adjacent pleural effusion (SUV 12.4; image 94). The FDG avid mass extends into the mediastinum.\par \par FDG avid approximately 1 cm nodule in the lingula (SUV 6.7; image 121).\par \par FDG avid nodular opacity in the superior segment of the right lower lobe measuring approximately 2.5 x 2.2 cm (SUV 7.9; image 105) and approximately 1 cm nodular opacity in the right middle lobe medially (SUV 4.8; image 103)..\par \par PLEURA/PERICARDIUM: Trace right pleural effusion, decreased from recent CT. Moderate left pleural effusion, not significantly changed. FDG avid pleural thickening in the left lower lung medially (SUV 8.8; image 126). Additional FDG avid pleural thickening in the left lower lobe posteriorly (SUV 7.1; image 132).\par Circumferential small pericardial effusion, not significantly changed from recent CT.\par \par HEPATOBILIARY/PANCREAS:  Physiologic FDG activity. Liver SUV mean is 2.3.\par \par SPLEEN: Physiologic FDG activity. Normal in size.\par \par ADRENAL GLANDS: No abnormal FDG activity. No nodule.\par \par KIDNEYS/URINARY BLADDER: Physiologic excreted FDG activity.\par \par REPRODUCTIVE ORGANS: No abnormal FDG activity.\par \par ABDOMINOPELVIC NODES: No enlarged or FDG-avid lymph node.\par \par BOWEL/PERITONEUM/MESENTERY: Mild diffuse gastric hypermetabolism without CT correlate (SUV 5.0; image 1:30).\par \par BONES: FDG avid 0.7 cm lucency with peripheral sclerosis in the left lateral aspect of T8 vertebra (SUV 10.8; image 95), 0.4 cm lucency with peripheral sclerosis in the right anterior aspect of T5 vertebra (SUV 5.5; image 76), punctate sclerosis in the midportion of L1 (SUV 3.5; image 139), subtle sclerosis in the right anterior 9th rib (SUV 3.1; image 153) , 0.6 cm lucency with peripheral sclerosis in the left anterior acetabulum (SUV 8.3; image 210) and 0 7 centimeters lucency with subtle sclerosis in the anterior aspect of the right proximal femur (SUV 8.9; image 232). .\par \par SOFT TISSUES: No abnormal FDG activity.\par \par IMPRESSION: Abnormal FDG-PET/CT scan.\par \par 1. FDG avid mass in the left lower lung likely corresponding to the newly diagnosed adenocarcinoma. FDG avid nodule in the lingula, satellite lesion versus metastasis.  FDG avid nodular opacities in the right middle and right lower lobes; differential includes infectious and metastatic disease.\par \par FDG avid pleural thickening in the left lower lung, suspicious for metastasis.\par \par 2. Multiple FDG avid mediastinal, and bilateral hilar lymph nodes compatible with metastases. FDG avid bilateral supraclavicular lymph nodes suspicious for additional metastatic lesions. The left supraclavicular lymph node is amenable to ultrasound-guided FNA biopsy as indicated.\par \par 3. Scattered mildly FDG avid osseous foci in the axial skeleton and right proximal femur, concerning for metastases.\par \par 4.  FDG avid low-attenuation lesion within the enlarged left thyroid lobe. Differential includes benign and malignant etiologies. This may be further evaluated with ultrasound and possible FNA biopsy.\par \par 5. Nonspecific diffuse gastric hypermetabolism without CT correlate. Please correlate clinically for gastritis.

## 2022-10-25 NOTE — REASON FOR VISIT
[Initial Evaluation] : an initial evaluation [Family Member] : family member [FreeTextEntry1] : stage IV NSCLC [FreeTextEntry3] : spouse, Gustavo

## 2022-10-25 NOTE — REVIEW OF SYSTEMS
[Feeling Poorly] : feeling poorly [Feeling Tired] : feeling tired [Nasal Discharge] : nasal discharge [As Noted in HPI] : as noted in HPI [Depression] : depression [Discharge From Eyes] : no purulent discharge from the eyes [Dry Eyes] : no dryness of the eyes [Sore Throat] : no sore throat [Chest Pain] : no chest pain [Palpitations] : no palpitations [Shortness Of Breath] : no shortness of breath [Cough] : no cough [SOB on Exertion] : no shortness of breath during exertion [Abdominal Pain] : no abdominal pain [Vomiting] : no vomiting [Constipation] : no constipation [Diarrhea] : no diarrhea [Dysuria] : no dysuria [Incontinence] : no incontinence [Skin Wound] : no skin wound [Dizziness] : no dizziness [Suicidal] : not suicidal [FreeTextEntry7] : diminished appetite

## 2022-10-25 NOTE — PHYSICAL EXAM
[No Acute Distress] : in no acute distress [Alert] : alert [Sclera] : the sclera and conjunctiva were normal [EOMI] : extraocular movements were intact [PERRL] : pupils were equal in size, round, and reactive to light [Normal Oral Mucosa] : normal oral mucosa [Normal Outer Ear/Nose] : the ears and nose were normal in appearance [Both Tympanic Membranes Were Examined] : both tympanic membranes were normal [Normal Appearance] : the appearance of the neck was normal [Supple] : the neck was supple [No Respiratory Distress] : no respiratory distress [No Acc Muscle Use] : no accessory muscle use [Respiration, Rhythm And Depth] : normal respiratory rhythm and effort [Auscultation Breath Sounds / Voice Sounds] : lungs were clear to auscultation bilaterally [Normal S1, S2] : normal S1 and S2 [Heart Rate And Rhythm] : heart rate was normal and rhythm regular [Edema] : edema was not present [Pedal Pulses Normal] : the pedal pulses are present [Bowel Sounds] : normal bowel sounds [Abdomen Tenderness] : non-tender [Abdomen Soft] : soft [Cervical Lymph Nodes Enlarged Posterior Bilaterally] : posterior cervical [Cervical Lymph Nodes Enlarged Anterior Bilaterally] : anterior cervical, supraclavicular [No CVA Tenderness] : no CVA  tenderness [No Spinal Tenderness] : no spinal tenderness [Normal Gait] : normal gait [Motor Tone] : muscle strength and tone were normal [Normal Color / Pigmentation] : normal skin color and pigmentation [No Focal Deficits] : no focal deficits [Oriented To Time, Place, And Person] : oriented to person, place, and time [Normal Affect] : the affect was normal [Normal Insight/Judgment] : insight and judgment were intact [Normal Mood] : the mood was normal

## 2022-10-28 ENCOUNTER — APPOINTMENT (OUTPATIENT)
Dept: HEMATOLOGY ONCOLOGY | Facility: CLINIC | Age: 82
End: 2022-10-28

## 2022-10-28 VITALS
DIASTOLIC BLOOD PRESSURE: 72 MMHG | SYSTOLIC BLOOD PRESSURE: 126 MMHG | BODY MASS INDEX: 21.25 KG/M2 | HEIGHT: 59.96 IN | TEMPERATURE: 97.2 F | HEART RATE: 82 BPM | WEIGHT: 108.25 LBS | OXYGEN SATURATION: 99 % | RESPIRATION RATE: 16 BRPM

## 2022-10-28 DIAGNOSIS — F43.21 ADJUSTMENT DISORDER WITH DEPRESSED MOOD: ICD-10-CM

## 2022-10-28 PROCEDURE — 99205 OFFICE O/P NEW HI 60 MIN: CPT

## 2022-10-28 RX ORDER — SUCRALFATE 1 G/1
1 TABLET ORAL
Qty: 360 | Refills: 2 | Status: DISCONTINUED | COMMUNITY
Start: 2022-09-16 | End: 2022-10-28

## 2022-10-28 RX ORDER — ETANERCEPT 50 MG/ML
50 SOLUTION SUBCUTANEOUS WEEKLY
Refills: 0 | Status: DISCONTINUED | COMMUNITY
Start: 2022-08-26 | End: 2022-10-28

## 2022-10-30 PROBLEM — R09.82 POST-NASAL DRIP: Status: ACTIVE | Noted: 2022-10-30

## 2022-10-30 PROBLEM — R04.0 EPISTAXIS: Status: ACTIVE | Noted: 2022-10-30

## 2022-10-30 PROBLEM — J32.9 SINUSITIS: Status: ACTIVE | Noted: 2022-10-10

## 2022-10-30 NOTE — ASSESSMENT
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 Currently she is with moderate left sided effusion, though with stable respiratory symptoms. Will defer pleural intervention at this time. Plan for clinical surveillance. Instructed patient to notify clinic if worsening SOB due to need for holding anticoagulation before intervention. \par \par - will add flonase/ocean spray for nasal congestion\par - Will continue clinicoradiographic follow up for the pleural effusion.\par - If worsening symptoms noted, we will plan for therapeutic pleural intervention\par - Thoracentesis vs TIPC based on speed and rate of recurrence. \par - We discussed the risks and benefits of both interventions as well as all alternative interventions. \par \par Patient and  demonstrated understanding. Follow up in 6 weeks.

## 2022-10-30 NOTE — PROCEDURE
[Thoracic Ultrasound] : Thoracic Ultrasound [Pleural Effusion] : Pleural Effusion: Yes [Simple] : simple [Normal] : Normal [de-identified] : Pleural effusion, cough [FreeTextEntry2] : Moderate left sided pleural effusion with compressive atelectasis; no notable diaphragmatic inversion

## 2022-10-30 NOTE — HISTORY OF PRESENT ILLNESS
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 She had recent EBUS/TBNA and thoracentesis on 9/1/22. Pathology with non-small cell lung adenocarcinoma, Stage IV with malignant pleural effusion,.\par \par PET/CT (Sept 2022) with FDG avid mass in the left lower lung likely corresponding to the newly diagnosed adenocarcinoma. FDG avid nodule in the lingula, satellite lesion versus metastasis. FDG avid nodular opacities in the right middle and right lower lobes; differential includes infectious and metastatic disease. FDG avid pleural thickening in the left lower lung, suspicious for metastasis. Multiple FDG avid mediastinal, and bilateral hilar lymph nodes compatible with metastases. FDG avid bilateral supraclavicular lymph nodes suspicious for additional metastatic lesions. The left supraclavicular lymph node is amenable to ultrasound-guided FNA biopsy as indicated. Scattered mildly FDG avid osseous foci in the axial skeleton and right proximal femur, concerning for metastases. FDG avid low-attenuation lesion within the enlarged left thyroid lobe. Differential includes benign and malignant etiologies. This may be further evaluated with ultrasound and possible FNA biopsy. Nonspecific diffuse gastric hypermetabolism without CT correlate. Please correlate clinically for gastritis. \par \par Last seen in clinic 9/19. Currently reports that breathing is stable and has not been the major limiting factor in her daily activities. Denies notable new fevers, chills, chest discomfort, abdominal pain. Biopsy results showing PDL1 40% with NGS indicating EGFR exon 18 and 20 mutations, atypical though responsive to TKI. Started on osermitinib. Reports some nasal congestion.

## 2022-10-30 NOTE — PHYSICAL EXAM
[No Acute Distress] : no acute distress [Normal Oropharynx] : normal oropharynx [Normal Appearance] : normal appearance [No Neck Mass] : no neck mass [Normal Rate/Rhythm] : normal rate/rhythm [Normal S1, S2] : normal s1, s2 [No Murmurs] : no murmurs [No Resp Distress] : no resp distress [No Acc Muscle Use] : no acc muscle use [Clear to Auscultation Bilaterally] : clear to auscultation bilaterally [No Abnormalities] : no abnormalities [Benign] : benign [Normal Gait] : normal gait [No Clubbing] : no clubbing [No Cyanosis] : no cyanosis [No Edema] : no edema [FROM] : FROM [Normal Color/ Pigmentation] : normal color/ pigmentation [No Focal Deficits] : no focal deficits [Oriented x3] : oriented x3 [Normal Affect] : normal affect [TextBox_68] : Reduce breath sounds at left base

## 2022-11-01 ENCOUNTER — RX CHANGE (OUTPATIENT)
Age: 82
End: 2022-11-01

## 2022-11-02 PROBLEM — F43.21 ADJUSTMENT DISORDER WITH DEPRESSED MOOD: Status: ACTIVE | Noted: 2022-11-02

## 2022-11-02 RX ORDER — FLUTICASONE PROPIONATE 50 UG/1
50 SPRAY, METERED NASAL TWICE DAILY
Qty: 2 | Refills: 0 | Status: COMPLETED | COMMUNITY
Start: 2022-10-10

## 2022-11-08 NOTE — PHYSICAL EXAM
[General Appearance - Alert] : alert [General Appearance - In No Acute Distress] : in no acute distress [Sclera] : the sclera and conjunctiva were normal [Normal Oral Mucosa] : normal oral mucosa [Neck Appearance] : the appearance of the neck was normal [] : no respiratory distress [Auscultation Breath Sounds / Voice Sounds] : lungs were clear to auscultation bilaterally [Heart Rate And Rhythm] : heart rate was normal and rhythm regular [Heart Sounds] : normal S1 and S2 [Bowel Sounds] : normal bowel sounds [Abdomen Soft] : soft [Abdomen Tenderness] : non-tender [Edema] : there was no peripheral edema [Skin Color & Pigmentation] : normal skin color and pigmentation [No Focal Deficits] : no focal deficits [Oriented To Time, Place, And Person] : oriented to person, place, and time [Affect] : the affect was normal [FreeTextEntry1] : dry cracked skin on feet

## 2022-11-08 NOTE — HISTORY OF PRESENT ILLNESS
[FreeTextEntry1] : 81yoF with stage IV NSCLC (mets to R lung, L pleura, bone, L pleural effusion) presents for initial palliative care visit, referred by Dr. Persaud. \par PMH significant for PE on Apixaban 2.5mg (lowered from 5mg due to nosebleeds), RA (on Enbrel until recently, on hydroxychloroquine), HTN, HLD, and osteoporosis, cataracts.  \par \par She started experiencing GI issues since COVID infection in May 2022. Went to PCP who referred her to ER for RUQ pain- with concern of GB issues. CT scan at that time showed suspicious findings in the lung. Additional work up as below: \par \par CT chest A/P: LLL mass with mets to right lung, left pleura, bone (thoracic spine & R proximal femur), malignant left pleural effusion s/p thoracentesis (9/1/22), PE was also noted. She had recent EBUS/TBNA and thoracentesis on 9/1/22. Pathology with non-small cell lung adenocarcinoma, stage IV with malignant pleural effusion, lung primary\par She is on treatment with Osimertinib since 9/27/22. \par \par Main reason for which patient is referred is symptom management. \par \par She was started on Mirtazapine 7.5mg recently. She said she took it only one time and felt "crazy in bed." She felt restless and unable to fall asleep. Has not resumed it nor does she wish to consider doing so. \par \par Has been having episodes of dizziness preceding her bowel movements. Has some pain in her lower abdomen. Has 2-3 BMs per day, soft in consistency. \par \par ROS:\par +abdominal gas\par +fatigue - dozes during the day\par +low appetite - was worse before, she reports that it has been getting better recently as she has been forcing herself to eat. \par +trouble sleeping at night \par +nausea - typically when she sees food it causes her to become nauseous. She is now on pantoprazole, famotidine. Has not been using ondansetron which was previously prescribed \par +mood has been very poor lately; \par +some low back pain, not to the point where she has to use medication. \par Denies \par \par Patient is , lives with her . She has one child. Lives in a ranch style house. \par Prior to cancer she was very active. Woke up early, went to [employer] regularly, watched her grandchildren. \par \par Independent in her ADLs. Has not been driving lately. \par \par Rheumatologist: Dr. Miller\par \par I-STOP Ref#:  [id number]

## 2022-11-08 NOTE — ASSESSMENT
[FreeTextEntry1] : 81yoF with:\par \par # Stage IV NSCLC - On Osimertinb; Med Onc follow up. \par \par # Adjustment disorder - Discussed re-initiation of mirtazapine at a higher dose of 15mg which should provide less sedation. She prefers not to do this. \par \par # Trouble sleeping - proposed that pt trial low dose medicinal cannabis for therapeutic effect. She prefers not to do this. \par \par # Dizziness episodes - Per patient's description of these events, they occur prior to BMs. Thus these dizzy spells could be related to shunting of blood flow to GI tract prior to the BM. Patient cautioned about finding a place to sit when these episodes occur. \par \par # Nausea - Ondansetron ODT renewed for patient.\par Contacted GI to confirm whether patient should remain on both H2 blocker and PPI. \par Patient may try using peppermint tea to help with GERD/nausea symptoms as she would like to use natural measures. \par \par # Encounter for palliative care- Emotional support provided.\par Explained the role of palliative care in enhancing quality of life in the setting of serious illness. Answered all her questions.\par \par Follow up in 1 month, call sooner with questions or issues.

## 2022-11-11 ENCOUNTER — RESULT REVIEW (OUTPATIENT)
Age: 82
End: 2022-11-11

## 2022-11-11 ENCOUNTER — APPOINTMENT (OUTPATIENT)
Dept: HEMATOLOGY ONCOLOGY | Facility: CLINIC | Age: 82
End: 2022-11-11

## 2022-11-11 VITALS — SYSTOLIC BLOOD PRESSURE: 138 MMHG | OXYGEN SATURATION: 98 % | DIASTOLIC BLOOD PRESSURE: 80 MMHG

## 2022-11-11 VITALS
BODY MASS INDEX: 21.56 KG/M2 | RESPIRATION RATE: 16 BRPM | TEMPERATURE: 97.3 F | HEART RATE: 80 BPM | DIASTOLIC BLOOD PRESSURE: 81 MMHG | OXYGEN SATURATION: 100 % | SYSTOLIC BLOOD PRESSURE: 151 MMHG | WEIGHT: 110.23 LBS

## 2022-11-11 VITALS — DIASTOLIC BLOOD PRESSURE: 74 MMHG | OXYGEN SATURATION: 98 % | SYSTOLIC BLOOD PRESSURE: 127 MMHG | HEART RATE: 77 BPM

## 2022-11-11 VITALS — DIASTOLIC BLOOD PRESSURE: 83 MMHG | OXYGEN SATURATION: 99 % | SYSTOLIC BLOOD PRESSURE: 144 MMHG | HEART RATE: 78 BPM

## 2022-11-11 DIAGNOSIS — R42 DIZZINESS AND GIDDINESS: ICD-10-CM

## 2022-11-11 LAB
BASOPHILS # BLD AUTO: 0.02 K/UL — SIGNIFICANT CHANGE UP (ref 0–0.2)
BASOPHILS NFR BLD AUTO: 0.8 % — SIGNIFICANT CHANGE UP (ref 0–2)
EOSINOPHIL # BLD AUTO: 0.04 K/UL — SIGNIFICANT CHANGE UP (ref 0–0.5)
EOSINOPHIL NFR BLD AUTO: 1.5 % — SIGNIFICANT CHANGE UP (ref 0–6)
HCT VFR BLD CALC: 34.5 % — SIGNIFICANT CHANGE UP (ref 34.5–45)
HGB BLD-MCNC: 10.6 G/DL — LOW (ref 11.5–15.5)
IMM GRANULOCYTES NFR BLD AUTO: 0 % — SIGNIFICANT CHANGE UP (ref 0–0.9)
LYMPHOCYTES # BLD AUTO: 0.98 K/UL — LOW (ref 1–3.3)
LYMPHOCYTES # BLD AUTO: 37 % — SIGNIFICANT CHANGE UP (ref 13–44)
MCHC RBC-ENTMCNC: 28.5 PG — SIGNIFICANT CHANGE UP (ref 27–34)
MCHC RBC-ENTMCNC: 30.7 G/DL — LOW (ref 32–36)
MCV RBC AUTO: 92.7 FL — SIGNIFICANT CHANGE UP (ref 80–100)
MONOCYTES # BLD AUTO: 0.26 K/UL — SIGNIFICANT CHANGE UP (ref 0–0.9)
MONOCYTES NFR BLD AUTO: 9.8 % — SIGNIFICANT CHANGE UP (ref 2–14)
NEUTROPHILS # BLD AUTO: 1.35 K/UL — LOW (ref 1.8–7.4)
NEUTROPHILS NFR BLD AUTO: 50.9 % — SIGNIFICANT CHANGE UP (ref 43–77)
NRBC # BLD: 0 /100 WBCS — SIGNIFICANT CHANGE UP (ref 0–0)
PLATELET # BLD AUTO: 186 K/UL — SIGNIFICANT CHANGE UP (ref 150–400)
RBC # BLD: 3.72 M/UL — LOW (ref 3.8–5.2)
RBC # FLD: 12.5 % — SIGNIFICANT CHANGE UP (ref 10.3–14.5)
WBC # BLD: 2.65 K/UL — LOW (ref 3.8–10.5)
WBC # FLD AUTO: 2.65 K/UL — LOW (ref 3.8–10.5)

## 2022-11-11 PROCEDURE — 99215 OFFICE O/P EST HI 40 MIN: CPT

## 2022-11-14 LAB
ALBUMIN SERPL ELPH-MCNC: 3.9 G/DL
ALP BLD-CCNC: 57 U/L
ALT SERPL-CCNC: 15 U/L
ANION GAP SERPL CALC-SCNC: 11 MMOL/L
AST SERPL-CCNC: 17 U/L
BILIRUB SERPL-MCNC: 0.2 MG/DL
BUN SERPL-MCNC: 23 MG/DL
CALCIUM SERPL-MCNC: 8.6 MG/DL
CEA SERPL-MCNC: 34.7 NG/ML
CHLORIDE SERPL-SCNC: 106 MMOL/L
CO2 SERPL-SCNC: 23 MMOL/L
CREAT SERPL-MCNC: 0.96 MG/DL
EGFR: 59 ML/MIN/1.73M2
GLUCOSE SERPL-MCNC: 116 MG/DL
MAGNESIUM SERPL-MCNC: 2.3 MG/DL
POTASSIUM SERPL-SCNC: 4.7 MMOL/L
PROT SERPL-MCNC: 7.4 G/DL
SODIUM SERPL-SCNC: 140 MMOL/L
TSH SERPL-ACNC: 1.19 UIU/ML

## 2022-11-16 PROBLEM — R42 DIZZY SPELLS: Status: ACTIVE | Noted: 2022-11-02

## 2022-11-16 NOTE — REVIEW OF SYSTEMS
[Fatigue] : fatigue [Recent Change In Weight] : ~T recent weight change [Cough] : cough [SOB on Exertion] : shortness of breath during exertion [Dizziness] : dizziness [Anxiety] : anxiety [Depression] : depression [Negative] : Allergic/Immunologic [FreeTextEntry7] : GERD-like symptoms [de-identified] : heavy head [de-identified] : pt marked high on depression screen- she is not suicidal. Will refer to SW

## 2022-11-16 NOTE — ASSESSMENT
[FreeTextEntry1] : 80 yo w with some comorbities, including RA, with newly diagnosed lung adeno ca\par Noted notes from pother providers, history obtained in detail, images reviewed independently and so were labs\par Specifically, PET/CT showed FDG avid mass in the left lower lung likely corresponding to the newly diagnosed adenocarcinoma. FDG avid nodule in the lingula, satellite lesion versus metastasis. FDG avid nodular opacities in the right middle and right lower lobes; differential includes infectious and metastatic disease. FDG avid pleural thickening in the left lower lung, suspicious for metastasis. Multiple FDG avid mediastinal, and bilateral hilar lymph nodes compatible with metastases. FDG avid bilateral supraclavicular lymph nodes suspicious for additional metastatic lesions. The left supraclavicular lymph node is amenable to ultrasound-guided FNA biopsy as indicated.  Scattered mildly FDG avid osseous foci in the axial skeleton and right proximal femur, concerning for metastases.  FDG avid low-attenuation lesion within the enlarged left thyroid lobe. Differential includes benign and malignant etiologies. This may be further evaluated with ultrasound and possible FNA biopsy. Nonspecific diffuse gastric hypermetabolism without CT correlate. Please correlate clinically for gastritis.\par \par Brain MRI showed no mets\par PDL1 40% and NGS showed multiple muts, importantly, EGFR exon 18 and exon 20 muts \par We discussed at depth dx of lung adeno ca, tumor genetics and palliative intent of tx\par We went over the three basic options for tx including chemo (pt's underlyuing comorbidities and PS makes this less desirable but can be used in the future), immunotherapy (while PDL1 is 40%, the presence of EGFR and never-smoking status makes this unlikely to be beneficial and moreover, with active RA, toxicity would be of concern as well). That leaves us with targeted tx. Fortunately, there is EGFR mut(s) which based on literature review are sensitive to TKI. These atypical mutations however, have subdued responses to TKI compared to common ones. Nevertheless, this seems to be the best option at this time. \par Of the TKIs, afatinib is approved for rare EGFR muts. However, given severe gastritis, tolerance would be difficult. Osimertinib is the least toxic of all agents (GI toxicity) and was thought to be the best option here\par \par Pt started on osimertinib 80 mg daily on 9/23\par She is tolerating this well but GI symptoms may have slightly worsened since she started this med\par Dizziness- unclear etiology but sounds vasovagal. Discussed adequate oral hydration with pt and reiterated the importance of being careful while indulging in activities that cause dizziness (have a support near by or an alarm bracelet)\par EKG today\par Will get brain imaging again to ensure no new findings there, will include IAC\par GI symptoms discussed in detail. Appreciate input from [facility]. \par Given heightened anxiety, will start low dose anxiolytic (lorazepam) to be used as needed\par Discussed administration, potential AEs, importance of close monitoring with labs, toxicity and efficacy assessment, and EKG while on this high risk medication\par Follow up with GI\par Xgeva for bone mets (dental clearance obtained)\par \par \par \par

## 2022-11-16 NOTE — HISTORY OF PRESENT ILLNESS
[Disease: _____________________] : Disease: [unfilled] [T: ___] : T[unfilled] [N: ___] : N[unfilled] [M: ___] : M[unfilled] [AJCC Stage: ____] : AJCC Stage: [unfilled] [de-identified] : 81 year-old female with med hx of RA (Enbrel and hydroxycholorquine), osteoporosis (on prolia), dyslipidemia, started expereincing GI issues since COVID infection in May 2022. Went to PCP who referred her to ER for rt UQ pain- with concern of GB issues. CT scan at that time showed suspicious findings in the lung. Additional work up as below: \par \par CT chest A/P: LLL mass with mets to right lung, left pleura, bone (thoracic spine & R proximal femur), malignant left pleural effusion s/p thoracentesis (9/1/22), PE was also noted. She had recent EBUS/TBNA and thoracentesis on 9/1/22. Pathology with non-small cell lung adenocarcinoma, stage IV with malignant pleural effusion, lung primary\par \par PET/CT (Sept 2022) with FDG avid mass in the left lower lung likely corresponding to the newly diagnosed adenocarcinoma. FDG avid nodule in the lingula, satellite lesion versus metastasis. FDG avid nodular opacities in the right middle and right lower lobes; differential includes infectious and metastatic disease. FDG avid pleural thickening in the left lower lung, suspicious for metastasis. Multiple FDG avid mediastinal, and bilateral hilar lymph nodes compatible with metastases. FDG avid bilateral supraclavicular lymph nodes suspicious for additional metastatic lesions. The left supraclavicular lymph node is amenable to ultrasound-guided FNA biopsy as indicated. Scattered mildly FDG avid osseous foci in the axial skeleton and right proximal femur, concerning for metastases. FDG avid low-attenuation lesion within the enlarged left thyroid lobe. Differential includes benign and malignant etiologies. This may be further evaluated with ultrasound and possible FNA biopsy. Nonspecific diffuse gastric hypermetabolism without CT correlate. Please correlate clinically for gastritis. \par \par In terms of her symptoms, both patient and  endorsed that her symptoms of dyspnea did improve after the thoracentesis. Currently she feels well. She does have an decreased appetite and ongoing GI issues which have improved with PPI. \par \par \par 11/11/22: Pt here for follow up. She has no cough but she does have increased nausea and dizziness. She saw Dr. Street for symtom management. Dizziness mainly precedes dizziness preceding her bowel movements. Has some pain in her lower abdomen. Has 2-3 BMs per day, soft in consistency. She also notes increased bloating and fatigue. Appetite seems to be a little bettr and she has gained some weight. She admits to feeling depressed most of the time but she is not suicidal. \par \par  [de-identified] : adeno ca

## 2022-11-18 ENCOUNTER — APPOINTMENT (OUTPATIENT)
Dept: GERIATRICS | Facility: CLINIC | Age: 82
End: 2022-11-18

## 2022-11-18 ENCOUNTER — NON-APPOINTMENT (OUTPATIENT)
Age: 82
End: 2022-11-18

## 2022-11-18 DIAGNOSIS — M25.552 PAIN IN RIGHT HIP: ICD-10-CM

## 2022-11-18 DIAGNOSIS — M25.551 PAIN IN RIGHT HIP: ICD-10-CM

## 2022-11-18 PROCEDURE — 99215 OFFICE O/P EST HI 40 MIN: CPT

## 2022-11-21 ENCOUNTER — APPOINTMENT (OUTPATIENT)
Dept: PULMONOLOGY | Facility: CLINIC | Age: 82
End: 2022-11-21

## 2022-11-21 VITALS
HEIGHT: 59 IN | SYSTOLIC BLOOD PRESSURE: 124 MMHG | HEART RATE: 76 BPM | OXYGEN SATURATION: 98 % | TEMPERATURE: 97.2 F | WEIGHT: 109 LBS | RESPIRATION RATE: 17 BRPM | DIASTOLIC BLOOD PRESSURE: 70 MMHG | BODY MASS INDEX: 21.97 KG/M2

## 2022-11-21 PROCEDURE — 76604 US EXAM CHEST: CPT

## 2022-11-21 PROCEDURE — 99214 OFFICE O/P EST MOD 30 MIN: CPT | Mod: 25

## 2022-11-22 ENCOUNTER — APPOINTMENT (OUTPATIENT)
Dept: GERIATRICS | Facility: CLINIC | Age: 82
End: 2022-11-22

## 2022-11-22 VITALS
HEART RATE: 78 BPM | RESPIRATION RATE: 16 BRPM | SYSTOLIC BLOOD PRESSURE: 102 MMHG | DIASTOLIC BLOOD PRESSURE: 60 MMHG | HEIGHT: 59 IN | BODY MASS INDEX: 21.97 KG/M2 | OXYGEN SATURATION: 95 % | TEMPERATURE: 97.2 F | WEIGHT: 109 LBS

## 2022-11-22 VITALS — SYSTOLIC BLOOD PRESSURE: 110 MMHG | DIASTOLIC BLOOD PRESSURE: 50 MMHG

## 2022-11-22 PROCEDURE — 99215 OFFICE O/P EST HI 40 MIN: CPT

## 2022-11-22 RX ORDER — AMLODIPINE BESYLATE 2.5 MG/1
2.5 TABLET ORAL DAILY
Qty: 30 | Refills: 3 | Status: DISCONTINUED | COMMUNITY
Start: 2022-08-26 | End: 2022-11-22

## 2022-11-22 NOTE — REASON FOR VISIT
[Acute] : an acute visit [Spouse] : spouse [FreeTextEntry1] : bilateral hip pain radiating to feet and rash/ pruritis

## 2022-11-22 NOTE — PHYSICAL EXAM
[Alert] : alert [No Acute Distress] : in no acute distress [Sclera] : the sclera and conjunctiva were normal [EOMI] : extraocular movements were intact [PERRL] : pupils were equal in size, round, and reactive to light [Normal Oral Mucosa] : normal oral mucosa [Normal Outer Ear/Nose] : the ears and nose were normal in appearance [Normal Appearance] : the appearance of the neck was normal [Supple] : the neck was supple [No Respiratory Distress] : no respiratory distress [No Acc Muscle Use] : no accessory muscle use [Respiration, Rhythm And Depth] : normal respiratory rhythm and effort [Auscultation Breath Sounds / Voice Sounds] : lungs were clear to auscultation bilaterally [Normal S1, S2] : normal S1 and S2 [Heart Rate And Rhythm] : heart rate was normal and rhythm regular [Pedal Pulses Normal] : the pedal pulses are present [Bowel Sounds] : normal bowel sounds [Abdomen Tenderness] : non-tender [Abdomen Soft] : soft [Cervical Lymph Nodes Enlarged Posterior Bilaterally] : posterior cervical [Cervical Lymph Nodes Enlarged Anterior Bilaterally] : anterior cervical, supraclavicular [No CVA Tenderness] : no CVA  tenderness [No Spinal Tenderness] : no spinal tenderness [Normal Gait] : normal gait [Motor Tone] : muscle strength and tone were normal [No Focal Deficits] : no focal deficits [Oriented To Time, Place, And Person] : oriented to person, place, and time [Normal Affect] : the affect was normal [Normal Insight/Judgment] : insight and judgment were intact [Normal Mood] : the mood was normal [de-identified] : 1+ b/l LE edema L>R; no calf pain, rubor or erythema   [de-identified] : acneiform rash on b/l shins (~50%)

## 2022-11-22 NOTE — HISTORY OF PRESENT ILLNESS
[No falls in past year] : Patient reported no falls in the past year [Completely Independent] : Completely independent. [Independent] : managing finances [] : Assistance needed with traveling/transport [0] : 2) Feeling down, depressed, or hopeless: Not at all (0) [PHQ-2 Negative - No further assessment needed] : PHQ-2 Negative - No further assessment needed [With Patient/Caregiver] : , with patient/caregiver [I will adhere to the patient's wishes.] : I will adhere to the patient's wishes. [Time Spent: ___ minutes] : Time Spent: [unfilled] minutes [FreeTextEntry1] : VIPIN KENDRICK is an 82 yo woman with stage IV NSCLC (on Tagrisso), rheumatoid arthritis, HTN, HLD, osteoporosis, hx of COVID-19 (5/2022) and hx of PE who presents for acute visit to evaluate bilateral hip pain and rash. Patient is accompanied by her  for visit. \par \par Hip pain:\par -started about 1 week ago \par -bilateral hip pain radiating down her legs to both feet\par -descibed as "burning"\par -No evidence of saddle anesthesia, no episodes of urinary or fecal incontinence, no changes in gait\par -Discomfort is relieved by Tylenol\par -patient has mets to right lung, left pleura (thoracic spine & R proximal femur); has repeat imaging ordered by Dr. Persaud but pending prior authorization\par -does endorse LE edema (left >right) but states that this is chronic from previous ankle surgery she had in the past; denies calf pain\par \par Rash:\par -Rash developed on b/l shins over the weekend \par -also with  diffuse pruritis, mainly on legs and arms  \par -denies fever/chills, rash anywhere else on body, new fragrances/detergents\par \par  [de-identified] : 6 [de-identified] : 7 [[id number]] : 1 [AdvancecareDate] : 11/22 [FreeTextEntry4] : -HCP form completed; primary HCP is , Gustavo [phone] and secondary HCP is  son, Richardson [phone] \par -no MOLST in place; plan to open discussion at f/u visit

## 2022-11-22 NOTE — REVIEW OF SYSTEMS
[Feeling Tired] : feeling tired [As Noted in HPI] : as noted in HPI [Feeling Poorly] : not feeling poorly [Discharge From Eyes] : no purulent discharge from the eyes [Dry Eyes] : no dryness of the eyes [Nasal Discharge] : no nasal discharge [Sore Throat] : no sore throat [Chest Pain] : no chest pain [Palpitations] : no palpitations [Shortness Of Breath] : no shortness of breath [Wheezing] : no wheezing [Cough] : no cough [SOB on Exertion] : no shortness of breath during exertion [Abdominal Pain] : no abdominal pain [Vomiting] : no vomiting [Constipation] : no constipation [Diarrhea] : no diarrhea [Dysuria] : no dysuria [Dizziness] : no dizziness

## 2022-11-22 NOTE — REASON FOR VISIT
[Home] : at home, [unfilled] , at the time of the visit. [Medical Office: ([facility])___] : at the medical office located in  [Spouse] : spouse [Patient] : the patient [This encounter was initiated by telehealth (audio with video) and converted to telephone (audio only) due to technical difficulties.] : This encounter was initiated by telehealth (audio with video) and converted to telephone (audio only) due to technical difficulties. [Acute] : an acute visit [Family Member] : family member

## 2022-11-22 NOTE — ASSESSMENT
[FreeTextEntry1] : has f/u in December; earlier PRN \par -Will f/u to ensure that imaging is done promplty\par \par Tiny Hunt, FNP-BC

## 2022-11-23 ENCOUNTER — RESULT REVIEW (OUTPATIENT)
Age: 82
End: 2022-11-23

## 2022-11-25 NOTE — DISCUSSION/SUMMARY
[FreeTextEntry1] : \par PET/CT (Sept 2022) with FDG avid mass in the left lower lung likely corresponding to the newly diagnosed adenocarcinoma. FDG avid nodule in the lingula, satellite lesion versus metastasis. FDG avid nodular opacities in the right middle and right lower lobes; differential includes infectious and metastatic disease. FDG avid pleural thickening in the left lower lung, suspicious for metastasis. Multiple FDG avid mediastinal, and bilateral hilar lymph nodes compatible with metastases. FDG avid bilateral supraclavicular lymph nodes suspicious for additional metastatic lesions. The left supraclavicular lymph node is amenable to ultrasound-guided FNA biopsy as indicated. Scattered mildly FDG avid osseous foci in the axial skeleton and right proximal femur, concerning for metastases. FDG avid low-attenuation lesion within the enlarged left thyroid lobe. Differential includes benign and malignant etiologies. This may be further evaluated with ultrasound and possible FNA biopsy. Nonspecific diffuse gastric hypermetabolism without CT correlate. Please correlate clinically for gastritis.

## 2022-11-25 NOTE — ASSESSMENT
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 Currently she is with moderate left sided effusion, though with stable respiratory symptoms. Will defer pleural intervention at this time based on lack of symptoms and patient preference. Plan for clinical surveillance. Instructed patient to notify clinic if worsening SOB due to need for holding anticoagulation before intervention. \par \par - POCUS shows moderate left sided pleural effusion; unchanged from prior\par - patient is asymptomatic at this time\par - Will continue clinicoradiographic follow up for the pleural effusion.\par - If worsening symptoms noted, we will plan for therapeutic pleural intervention\par - Thoracentesis vs TIPC based on speed and rate of recurrence. \par - We discussed the risks and benefits of both interventions as well as all alternative interventions. \par \par Patient and  demonstrated understanding. Follow up in 3 months weeks. \par \par

## 2022-11-25 NOTE — PROCEDURE
[Thoracic Ultrasound] : Thoracic Ultrasound [Pleural Effusion] : Pleural Effusion: Yes [Simple] : simple [de-identified] : Malignnat Pleural Effusion [FreeTextEntry2] : Moderate left sided pleural effusion with compressive atelectasis. Effusion appears homogenous without septations.

## 2022-12-08 ENCOUNTER — APPOINTMENT (OUTPATIENT)
Dept: MRI IMAGING | Facility: CLINIC | Age: 82
End: 2022-12-08

## 2022-12-08 ENCOUNTER — APPOINTMENT (OUTPATIENT)
Dept: CT IMAGING | Facility: CLINIC | Age: 82
End: 2022-12-08

## 2022-12-08 ENCOUNTER — OUTPATIENT (OUTPATIENT)
Dept: OUTPATIENT SERVICES | Facility: HOSPITAL | Age: 82
LOS: 1 days | End: 2022-12-08
Payer: MEDICARE

## 2022-12-08 DIAGNOSIS — C34.92 MALIGNANT NEOPLASM OF UNSPECIFIED PART OF LEFT BRONCHUS OR LUNG: ICD-10-CM

## 2022-12-08 DIAGNOSIS — Z00.8 ENCOUNTER FOR OTHER GENERAL EXAMINATION: ICD-10-CM

## 2022-12-08 DIAGNOSIS — Z90.89 ACQUIRED ABSENCE OF OTHER ORGANS: Chronic | ICD-10-CM

## 2022-12-08 PROCEDURE — 71260 CT THORAX DX C+: CPT | Mod: 26

## 2022-12-08 PROCEDURE — 70553 MRI BRAIN STEM W/O & W/DYE: CPT

## 2022-12-08 PROCEDURE — 71260 CT THORAX DX C+: CPT

## 2022-12-08 PROCEDURE — 74177 CT ABD & PELVIS W/CONTRAST: CPT | Mod: 26

## 2022-12-08 PROCEDURE — 70553 MRI BRAIN STEM W/O & W/DYE: CPT | Mod: 26

## 2022-12-08 PROCEDURE — A9585: CPT

## 2022-12-08 PROCEDURE — 72156 MRI NECK SPINE W/O & W/DYE: CPT | Mod: 26

## 2022-12-08 PROCEDURE — 72156 MRI NECK SPINE W/O & W/DYE: CPT

## 2022-12-08 PROCEDURE — 74177 CT ABD & PELVIS W/CONTRAST: CPT

## 2022-12-12 ENCOUNTER — OUTPATIENT (OUTPATIENT)
Dept: OUTPATIENT SERVICES | Facility: HOSPITAL | Age: 82
LOS: 1 days | Discharge: HOME | End: 2022-12-12

## 2022-12-12 DIAGNOSIS — C34.90 MALIGNANT NEOPLASM OF UNSPECIFIED PART OF UNSPECIFIED BRONCHUS OR LUNG: ICD-10-CM

## 2022-12-12 DIAGNOSIS — Z90.89 ACQUIRED ABSENCE OF OTHER ORGANS: Chronic | ICD-10-CM

## 2022-12-16 ENCOUNTER — RESULT REVIEW (OUTPATIENT)
Age: 82
End: 2022-12-16

## 2022-12-16 ENCOUNTER — APPOINTMENT (OUTPATIENT)
Dept: HEMATOLOGY ONCOLOGY | Facility: CLINIC | Age: 82
End: 2022-12-16

## 2022-12-16 VITALS
TEMPERATURE: 97.7 F | BODY MASS INDEX: 21.24 KG/M2 | SYSTOLIC BLOOD PRESSURE: 144 MMHG | WEIGHT: 105.38 LBS | OXYGEN SATURATION: 97 % | RESPIRATION RATE: 16 BRPM | HEIGHT: 58.98 IN | HEART RATE: 85 BPM | DIASTOLIC BLOOD PRESSURE: 74 MMHG

## 2022-12-16 LAB
BASOPHILS # BLD AUTO: 0.01 K/UL — SIGNIFICANT CHANGE UP (ref 0–0.2)
BASOPHILS NFR BLD AUTO: 0.3 % — SIGNIFICANT CHANGE UP (ref 0–2)
EOSINOPHIL # BLD AUTO: 0 K/UL — SIGNIFICANT CHANGE UP (ref 0–0.5)
EOSINOPHIL NFR BLD AUTO: 0 % — SIGNIFICANT CHANGE UP (ref 0–6)
HCT VFR BLD CALC: 32.6 % — LOW (ref 34.5–45)
HGB BLD-MCNC: 10 G/DL — LOW (ref 11.5–15.5)
IMM GRANULOCYTES NFR BLD AUTO: 0 % — SIGNIFICANT CHANGE UP (ref 0–0.9)
LYMPHOCYTES # BLD AUTO: 0.35 K/UL — LOW (ref 1–3.3)
LYMPHOCYTES # BLD AUTO: 9.2 % — LOW (ref 13–44)
MCHC RBC-ENTMCNC: 28.5 PG — SIGNIFICANT CHANGE UP (ref 27–34)
MCHC RBC-ENTMCNC: 30.7 G/DL — LOW (ref 32–36)
MCV RBC AUTO: 92.9 FL — SIGNIFICANT CHANGE UP (ref 80–100)
MONOCYTES # BLD AUTO: 0.13 K/UL — SIGNIFICANT CHANGE UP (ref 0–0.9)
MONOCYTES NFR BLD AUTO: 3.4 % — SIGNIFICANT CHANGE UP (ref 2–14)
NEUTROPHILS # BLD AUTO: 3.32 K/UL — SIGNIFICANT CHANGE UP (ref 1.8–7.4)
NEUTROPHILS NFR BLD AUTO: 87.1 % — HIGH (ref 43–77)
NRBC # BLD: 0 /100 WBCS — SIGNIFICANT CHANGE UP (ref 0–0)
PLATELET # BLD AUTO: 151 K/UL — SIGNIFICANT CHANGE UP (ref 150–400)
RBC # BLD: 3.51 M/UL — LOW (ref 3.8–5.2)
RBC # FLD: 13.4 % — SIGNIFICANT CHANGE UP (ref 10.3–14.5)
WBC # BLD: 3.81 K/UL — SIGNIFICANT CHANGE UP (ref 3.8–10.5)
WBC # FLD AUTO: 3.81 K/UL — SIGNIFICANT CHANGE UP (ref 3.8–10.5)

## 2022-12-16 PROCEDURE — 99215 OFFICE O/P EST HI 40 MIN: CPT

## 2022-12-16 NOTE — REVIEW OF SYSTEMS
[Fatigue] : fatigue [Recent Change In Weight] : ~T recent weight change [Lower Ext Edema] : lower extremity edema [Cough] : no cough [SOB on Exertion] : no shortness of breath during exertion [Skin Rash] : skin rash [Dizziness] : dizziness [Anxiety] : anxiety [Depression] : depression [Negative] : Allergic/Immunologic [FreeTextEntry7] : GERD-like symptoms improved [de-identified] : heavy head [de-identified] : affect better

## 2022-12-16 NOTE — ASSESSMENT
[FreeTextEntry1] : 81 yo w with some comorbidities, including RA, with stage IV diagnosed lung adeno ca\par Baseline PET/CT showed FDG avid mass in the left lower lung likely corresponding to the newly diagnosed adenocarcinoma. FDG avid nodule in the lingula, satellite lesion versus metastasis. FDG avid nodular opacities in the right middle and right lower lobes; differential includes infectious and metastatic disease. FDG avid pleural thickening in the left lower lung, suspicious for metastasis. Multiple FDG avid mediastinal, and bilateral hilar lymph nodes compatible with metastases. FDG avid bilateral supraclavicular lymph nodes suspicious for additional metastatic lesions. The left supraclavicular lymph node is amenable to ultrasound-guided FNA biopsy as indicated.  Scattered mildly FDG avid osseous foci in the axial skeleton and right proximal femur, concerning for metastases.  FDG avid low-attenuation lesion within the enlarged left thyroid lobe. Differential includes benign and malignant etiologies. This may be further evaluated with ultrasound and possible FNA biopsy. Nonspecific diffuse gastric hypermetabolism without CT correlate. Please correlate clinically for gastritis. \par Brain MRI showed no mets\par PDL1 40% and NGS showed multiple muts, importantly, EGFR exon 18 and exon 20 muts \par We discussed at depth dx of lung adeno ca, tumor genetics and palliative intent of tx\par Pt started on osimertinib 80 mg daily on 9/23\par She is tolerating this well and most of her symptoms have resolved\par I reviewed recent scans and note significant improvement in disease burden in LAD\par Brain MRI showed no mets\par Thyroid nodule noted on baseline PET. Will check with next scan and if persistent, consider further work up. TSH normal\par Discussed administration, potential AEs, importance of close monitoring with labs, toxicity and efficacy assessment, and EKG while on this high risk medication\par Xgeva for bone mets (dental clearance obtained) to start ASAP. Pt has been taking Prolia for osteoporosis. \par OV in 6 weeks. \par \par \par \par

## 2022-12-16 NOTE — HISTORY OF PRESENT ILLNESS
[Disease: _____________________] : Disease: [unfilled] [T: ___] : T[unfilled] [N: ___] : N[unfilled] [M: ___] : M[unfilled] [AJCC Stage: ____] : AJCC Stage: [unfilled] [de-identified] : 81 year-old female with med hx of RA (Enbrel and hydroxycholorquine), osteoporosis (on prolia), dyslipidemia, started expereincing GI issues since COVID infection in May 2022. Went to PCP who referred her to ER for rt UQ pain- with concern of GB issues. CT scan at that time showed suspicious findings in the lung. Additional work up as below: \par \par CT chest A/P: LLL mass with mets to right lung, left pleura, bone (thoracic spine & R proximal femur), malignant left pleural effusion s/p thoracentesis (9/1/22), PE was also noted. She had recent EBUS/TBNA and thoracentesis on 9/1/22. Pathology with non-small cell lung adenocarcinoma, stage IV with malignant pleural effusion, lung primary\par \par PET/CT (Sept 2022) with FDG avid mass in the left lower lung likely corresponding to the newly diagnosed adenocarcinoma. FDG avid nodule in the lingula, satellite lesion versus metastasis. FDG avid nodular opacities in the right middle and right lower lobes; differential includes infectious and metastatic disease. FDG avid pleural thickening in the left lower lung, suspicious for metastasis. Multiple FDG avid mediastinal, and bilateral hilar lymph nodes compatible with metastases. FDG avid bilateral supraclavicular lymph nodes suspicious for additional metastatic lesions. The left supraclavicular lymph node is amenable to ultrasound-guided FNA biopsy as indicated. Scattered mildly FDG avid osseous foci in the axial skeleton and right proximal femur, concerning for metastases. FDG avid low-attenuation lesion within the enlarged left thyroid lobe. Differential includes benign and malignant etiologies. This may be further evaluated with ultrasound and possible FNA biopsy. Nonspecific diffuse gastric hypermetabolism without CT correlate. Please correlate clinically for gastritis. \par \par In terms of her symptoms, both patient and  endorsed that her symptoms of dyspnea did improve after the thoracentesis. Currently she feels well. She does have an decreased appetite and ongoing GI issues which have improved with PPI. \par \par \par 11/11/22: Pt here for follow up. She has no cough but she does have increased nausea and dizziness. She saw Dr. Street for symtom management. Dizziness mainly precedes dizziness preceding her bowel movements. Has some pain in her lower abdomen. Has 2-3 BMs per day, soft in consistency. She also notes increased bloating and fatigue. Appetite seems to be a little bettr and she has gained some weight. She admits to feeling depressed most of the time but she is not suicidal. \par \par 12/16/22: No issues today, Patient reports she feels better than her prior visit. She states she does not feel short of breath on exertion. No fevers, chills or cough. Patient reports some nausea with her osimertinib, otherwise no vomiting or diarrhea. She has persistent fatigue mid day. Rash on LE and mild ankle swelling\par \par \par  [de-identified] : adeno ca

## 2022-12-16 NOTE — PHYSICAL EXAM
[Restricted in physically strenuous activity but ambulatory and able to carry out work of a light or sedentary nature] : Status 1- Restricted in physically strenuous activity but ambulatory and able to carry out work of a light or sedentary nature, e.g., light house work, office work [Thin] : thin [Normal] : affect appropriate [de-identified] : acneiform rash on skin, and mild LE edema

## 2022-12-16 NOTE — CONSULT LETTER
[Dear  ___] : Dear  [unfilled], [Courtesy Letter:] : I had the pleasure of seeing your patient, [unfilled], in my office today. [Please see my note below.] : Please see my note below. [Sincerely,] : Sincerely, [FreeTextEntry2] : Dr. Paul Flores\par Phone#[phone] [FreeTextEntry3] : Gray Persaud MD\par \par

## 2022-12-18 LAB
ALBUMIN SERPL ELPH-MCNC: 4 G/DL
ALP BLD-CCNC: 52 U/L
ALT SERPL-CCNC: 34 U/L
ANION GAP SERPL CALC-SCNC: 9 MMOL/L
AST SERPL-CCNC: 23 U/L
BILIRUB SERPL-MCNC: 0.3 MG/DL
BUN SERPL-MCNC: 42 MG/DL
CALCIUM SERPL-MCNC: 9.6 MG/DL
CEA SERPL-MCNC: 37.3 NG/ML
CHLORIDE SERPL-SCNC: 104 MMOL/L
CO2 SERPL-SCNC: 24 MMOL/L
CREAT SERPL-MCNC: 1.17 MG/DL
EGFR: 47 ML/MIN/1.73M2
GLUCOSE SERPL-MCNC: 143 MG/DL
MAGNESIUM SERPL-MCNC: 2.1 MG/DL
POTASSIUM SERPL-SCNC: 5 MMOL/L
PROT SERPL-MCNC: 7.3 G/DL
SODIUM SERPL-SCNC: 138 MMOL/L
TSH SERPL-ACNC: 1.1 UIU/ML

## 2022-12-19 ENCOUNTER — APPOINTMENT (OUTPATIENT)
Dept: ULTRASOUND IMAGING | Facility: IMAGING CENTER | Age: 82
End: 2022-12-19

## 2022-12-19 ENCOUNTER — OUTPATIENT (OUTPATIENT)
Dept: OUTPATIENT SERVICES | Facility: HOSPITAL | Age: 82
LOS: 1 days | End: 2022-12-19
Payer: MEDICARE

## 2022-12-19 DIAGNOSIS — Z90.89 ACQUIRED ABSENCE OF OTHER ORGANS: Chronic | ICD-10-CM

## 2022-12-19 DIAGNOSIS — C34.92 MALIGNANT NEOPLASM OF UNSPECIFIED PART OF LEFT BRONCHUS OR LUNG: ICD-10-CM

## 2022-12-19 PROCEDURE — 93970 EXTREMITY STUDY: CPT

## 2022-12-19 PROCEDURE — 93970 EXTREMITY STUDY: CPT | Mod: 26

## 2022-12-27 ENCOUNTER — APPOINTMENT (OUTPATIENT)
Dept: GERIATRICS | Facility: CLINIC | Age: 82
End: 2022-12-27

## 2022-12-28 ENCOUNTER — RESULT REVIEW (OUTPATIENT)
Age: 82
End: 2022-12-28

## 2022-12-28 ENCOUNTER — APPOINTMENT (OUTPATIENT)
Dept: INFUSION THERAPY | Facility: HOSPITAL | Age: 82
End: 2022-12-28

## 2022-12-28 LAB
ANION GAP SERPL CALC-SCNC: 10 MMOL/L — SIGNIFICANT CHANGE UP (ref 5–17)
BASOPHILS # BLD AUTO: 0.02 K/UL — SIGNIFICANT CHANGE UP (ref 0–0.2)
BASOPHILS NFR BLD AUTO: 0.7 % — SIGNIFICANT CHANGE UP (ref 0–2)
BUN SERPL-MCNC: 33 MG/DL — HIGH (ref 7–23)
CALCIUM SERPL-MCNC: 9.3 MG/DL — SIGNIFICANT CHANGE UP (ref 8.4–10.5)
CHLORIDE SERPL-SCNC: 102 MMOL/L — SIGNIFICANT CHANGE UP (ref 96–108)
CO2 SERPL-SCNC: 26 MMOL/L — SIGNIFICANT CHANGE UP (ref 22–31)
CREAT SERPL-MCNC: 1.24 MG/DL — SIGNIFICANT CHANGE UP (ref 0.5–1.3)
EGFR: 43 ML/MIN/1.73M2 — LOW
EOSINOPHIL # BLD AUTO: 0.05 K/UL — SIGNIFICANT CHANGE UP (ref 0–0.5)
EOSINOPHIL NFR BLD AUTO: 1.7 % — SIGNIFICANT CHANGE UP (ref 0–6)
GLUCOSE SERPL-MCNC: 109 MG/DL — HIGH (ref 70–99)
HCT VFR BLD CALC: 31.6 % — LOW (ref 34.5–45)
HGB BLD-MCNC: 9.5 G/DL — LOW (ref 11.5–15.5)
IMM GRANULOCYTES NFR BLD AUTO: 0 % — SIGNIFICANT CHANGE UP (ref 0–0.9)
LYMPHOCYTES # BLD AUTO: 0.6 K/UL — LOW (ref 1–3.3)
LYMPHOCYTES # BLD AUTO: 20.8 % — SIGNIFICANT CHANGE UP (ref 13–44)
MCHC RBC-ENTMCNC: 28.3 PG — SIGNIFICANT CHANGE UP (ref 27–34)
MCHC RBC-ENTMCNC: 30.1 G/DL — LOW (ref 32–36)
MCV RBC AUTO: 94 FL — SIGNIFICANT CHANGE UP (ref 80–100)
MONOCYTES # BLD AUTO: 0.37 K/UL — SIGNIFICANT CHANGE UP (ref 0–0.9)
MONOCYTES NFR BLD AUTO: 12.8 % — SIGNIFICANT CHANGE UP (ref 2–14)
NEUTROPHILS # BLD AUTO: 1.84 K/UL — SIGNIFICANT CHANGE UP (ref 1.8–7.4)
NEUTROPHILS NFR BLD AUTO: 64 % — SIGNIFICANT CHANGE UP (ref 43–77)
NRBC # BLD: 0 /100 WBCS — SIGNIFICANT CHANGE UP (ref 0–0)
PLATELET # BLD AUTO: 157 K/UL — SIGNIFICANT CHANGE UP (ref 150–400)
POTASSIUM SERPL-MCNC: 4.3 MMOL/L — SIGNIFICANT CHANGE UP (ref 3.5–5.3)
POTASSIUM SERPL-SCNC: 4.3 MMOL/L — SIGNIFICANT CHANGE UP (ref 3.5–5.3)
RBC # BLD: 3.36 M/UL — LOW (ref 3.8–5.2)
RBC # FLD: 14.1 % — SIGNIFICANT CHANGE UP (ref 10.3–14.5)
SODIUM SERPL-SCNC: 138 MMOL/L — SIGNIFICANT CHANGE UP (ref 135–145)
WBC # BLD: 2.88 K/UL — LOW (ref 3.8–10.5)
WBC # FLD AUTO: 2.88 K/UL — LOW (ref 3.8–10.5)

## 2022-12-29 ENCOUNTER — APPOINTMENT (OUTPATIENT)
Dept: GERIATRICS | Facility: CLINIC | Age: 82
End: 2022-12-29
Payer: MEDICARE

## 2022-12-29 VITALS
HEIGHT: 55 IN | HEART RATE: 85 BPM | SYSTOLIC BLOOD PRESSURE: 112 MMHG | DIASTOLIC BLOOD PRESSURE: 72 MMHG | BODY MASS INDEX: 25.46 KG/M2 | RESPIRATION RATE: 16 BRPM | OXYGEN SATURATION: 99 % | WEIGHT: 110 LBS

## 2022-12-29 DIAGNOSIS — C79.51 SECONDARY MALIGNANT NEOPLASM OF BONE: ICD-10-CM

## 2022-12-29 PROCEDURE — 99215 OFFICE O/P EST HI 40 MIN: CPT

## 2023-01-01 ENCOUNTER — RESULT REVIEW (OUTPATIENT)
Age: 83
End: 2023-01-01

## 2023-01-01 ENCOUNTER — APPOINTMENT (OUTPATIENT)
Dept: INFUSION THERAPY | Facility: HOSPITAL | Age: 83
End: 2023-01-01

## 2023-01-01 ENCOUNTER — APPOINTMENT (OUTPATIENT)
Dept: CT IMAGING | Facility: IMAGING CENTER | Age: 83
End: 2023-01-01
Payer: MEDICARE

## 2023-01-01 ENCOUNTER — OUTPATIENT (OUTPATIENT)
Dept: OUTPATIENT SERVICES | Facility: HOSPITAL | Age: 83
LOS: 1 days | End: 2023-01-01
Payer: MEDICARE

## 2023-01-01 ENCOUNTER — RX RENEWAL (OUTPATIENT)
Age: 83
End: 2023-01-01

## 2023-01-01 ENCOUNTER — APPOINTMENT (OUTPATIENT)
Dept: GERIATRICS | Facility: CLINIC | Age: 83
End: 2023-01-01
Payer: MEDICARE

## 2023-01-01 ENCOUNTER — LABORATORY RESULT (OUTPATIENT)
Age: 83
End: 2023-01-01

## 2023-01-01 ENCOUNTER — TRANSCRIPTION ENCOUNTER (OUTPATIENT)
Age: 83
End: 2023-01-01

## 2023-01-01 ENCOUNTER — OUTPATIENT (OUTPATIENT)
Dept: OUTPATIENT SERVICES | Facility: HOSPITAL | Age: 83
LOS: 1 days | Discharge: HOME | End: 2023-01-01

## 2023-01-01 ENCOUNTER — NON-APPOINTMENT (OUTPATIENT)
Age: 83
End: 2023-01-01

## 2023-01-01 ENCOUNTER — APPOINTMENT (OUTPATIENT)
Dept: HEMATOLOGY ONCOLOGY | Facility: CLINIC | Age: 83
End: 2023-01-01
Payer: MEDICARE

## 2023-01-01 ENCOUNTER — INPATIENT (INPATIENT)
Facility: HOSPITAL | Age: 83
LOS: 3 days | Discharge: HOME | End: 2023-10-09
Attending: STUDENT IN AN ORGANIZED HEALTH CARE EDUCATION/TRAINING PROGRAM | Admitting: STUDENT IN AN ORGANIZED HEALTH CARE EDUCATION/TRAINING PROGRAM
Payer: MEDICARE

## 2023-01-01 ENCOUNTER — APPOINTMENT (OUTPATIENT)
Dept: PULMONOLOGY | Facility: CLINIC | Age: 83
End: 2023-01-01
Payer: MEDICARE

## 2023-01-01 ENCOUNTER — APPOINTMENT (OUTPATIENT)
Dept: NEPHROLOGY | Facility: CLINIC | Age: 83
End: 2023-01-01
Payer: MEDICARE

## 2023-01-01 VITALS
HEART RATE: 81 BPM | DIASTOLIC BLOOD PRESSURE: 54 MMHG | TEMPERATURE: 98 F | OXYGEN SATURATION: 100 % | RESPIRATION RATE: 16 BRPM | SYSTOLIC BLOOD PRESSURE: 99 MMHG

## 2023-01-01 VITALS
DIASTOLIC BLOOD PRESSURE: 67 MMHG | SYSTOLIC BLOOD PRESSURE: 118 MMHG | HEIGHT: 61 IN | TEMPERATURE: 98 F | BODY MASS INDEX: 18.52 KG/M2 | OXYGEN SATURATION: 98 % | HEART RATE: 90 BPM | WEIGHT: 98.1 LBS

## 2023-01-01 VITALS
RESPIRATION RATE: 16 BRPM | DIASTOLIC BLOOD PRESSURE: 72 MMHG | WEIGHT: 92.59 LBS | OXYGEN SATURATION: 98 % | SYSTOLIC BLOOD PRESSURE: 119 MMHG | HEIGHT: 61.02 IN | HEART RATE: 87 BPM | TEMPERATURE: 97.7 F | BODY MASS INDEX: 17.48 KG/M2

## 2023-01-01 VITALS
OXYGEN SATURATION: 95 % | DIASTOLIC BLOOD PRESSURE: 79 MMHG | HEIGHT: 60 IN | HEART RATE: 74 BPM | WEIGHT: 100.97 LBS | SYSTOLIC BLOOD PRESSURE: 143 MMHG | TEMPERATURE: 97.7 F | RESPIRATION RATE: 16 BRPM | BODY MASS INDEX: 19.82 KG/M2

## 2023-01-01 VITALS
TEMPERATURE: 96.9 F | DIASTOLIC BLOOD PRESSURE: 86 MMHG | OXYGEN SATURATION: 99 % | HEART RATE: 90 BPM | HEIGHT: 61.02 IN | BODY MASS INDEX: 17.47 KG/M2 | WEIGHT: 92.5 LBS | SYSTOLIC BLOOD PRESSURE: 150 MMHG

## 2023-01-01 VITALS
WEIGHT: 94.38 LBS | HEIGHT: 60 IN | BODY MASS INDEX: 18.53 KG/M2 | DIASTOLIC BLOOD PRESSURE: 81 MMHG | OXYGEN SATURATION: 99 % | TEMPERATURE: 97.5 F | SYSTOLIC BLOOD PRESSURE: 147 MMHG | RESPIRATION RATE: 16 BRPM | HEART RATE: 72 BPM

## 2023-01-01 VITALS
RESPIRATION RATE: 16 BRPM | HEIGHT: 61 IN | DIASTOLIC BLOOD PRESSURE: 66 MMHG | BODY MASS INDEX: 17.67 KG/M2 | TEMPERATURE: 98 F | HEART RATE: 84 BPM | SYSTOLIC BLOOD PRESSURE: 106 MMHG | WEIGHT: 93.6 LBS | OXYGEN SATURATION: 96 %

## 2023-01-01 VITALS
TEMPERATURE: 97.6 F | BODY MASS INDEX: 19.04 KG/M2 | HEART RATE: 86 BPM | DIASTOLIC BLOOD PRESSURE: 84 MMHG | WEIGHT: 97 LBS | OXYGEN SATURATION: 97 % | HEIGHT: 60 IN | SYSTOLIC BLOOD PRESSURE: 146 MMHG | RESPIRATION RATE: 16 BRPM

## 2023-01-01 VITALS
RESPIRATION RATE: 16 BRPM | DIASTOLIC BLOOD PRESSURE: 67 MMHG | SYSTOLIC BLOOD PRESSURE: 122 MMHG | OXYGEN SATURATION: 100 % | HEART RATE: 76 BPM | TEMPERATURE: 98 F

## 2023-01-01 VITALS
TEMPERATURE: 97.5 F | BODY MASS INDEX: 18.06 KG/M2 | SYSTOLIC BLOOD PRESSURE: 130 MMHG | DIASTOLIC BLOOD PRESSURE: 72 MMHG | HEIGHT: 61.02 IN | HEART RATE: 77 BPM | RESPIRATION RATE: 14 BRPM | WEIGHT: 95.68 LBS | OXYGEN SATURATION: 97 %

## 2023-01-01 VITALS
DIASTOLIC BLOOD PRESSURE: 58 MMHG | WEIGHT: 90.71 LBS | TEMPERATURE: 98.1 F | SYSTOLIC BLOOD PRESSURE: 95 MMHG | HEART RATE: 80 BPM | BODY MASS INDEX: 17.14 KG/M2 | OXYGEN SATURATION: 98 % | RESPIRATION RATE: 16 BRPM

## 2023-01-01 VITALS — SYSTOLIC BLOOD PRESSURE: 135 MMHG | DIASTOLIC BLOOD PRESSURE: 70 MMHG

## 2023-01-01 DIAGNOSIS — R52 PAIN, UNSPECIFIED: ICD-10-CM

## 2023-01-01 DIAGNOSIS — C34.90 MALIGNANT NEOPLASM OF UNSPECIFIED PART OF UNSPECIFIED BRONCHUS OR LUNG: ICD-10-CM

## 2023-01-01 DIAGNOSIS — L70.8 OTHER ACNE: ICD-10-CM

## 2023-01-01 DIAGNOSIS — E86.0 DEHYDRATION: ICD-10-CM

## 2023-01-01 DIAGNOSIS — L29.9 PRURITUS, UNSPECIFIED: ICD-10-CM

## 2023-01-01 DIAGNOSIS — R62.7 ADULT FAILURE TO THRIVE: ICD-10-CM

## 2023-01-01 DIAGNOSIS — Z98.49 CATARACT EXTRACTION STATUS, UNSPECIFIED EYE: Chronic | ICD-10-CM

## 2023-01-01 DIAGNOSIS — Z90.89 ACQUIRED ABSENCE OF OTHER ORGANS: Chronic | ICD-10-CM

## 2023-01-01 DIAGNOSIS — C79.51 SECONDARY MALIGNANT NEOPLASM OF BONE: ICD-10-CM

## 2023-01-01 DIAGNOSIS — E87.20 ACIDOSIS, UNSPECIFIED: ICD-10-CM

## 2023-01-01 DIAGNOSIS — E43 UNSPECIFIED SEVERE PROTEIN-CALORIE MALNUTRITION: ICD-10-CM

## 2023-01-01 DIAGNOSIS — K52.9 NONINFECTIVE GASTROENTERITIS AND COLITIS, UNSPECIFIED: ICD-10-CM

## 2023-01-01 DIAGNOSIS — G47.09 OTHER INSOMNIA: ICD-10-CM

## 2023-01-01 DIAGNOSIS — R63.0 ANOREXIA: ICD-10-CM

## 2023-01-01 DIAGNOSIS — E78.5 HYPERLIPIDEMIA, UNSPECIFIED: ICD-10-CM

## 2023-01-01 DIAGNOSIS — M06.9 RHEUMATOID ARTHRITIS, UNSPECIFIED: ICD-10-CM

## 2023-01-01 DIAGNOSIS — Z86.711 PERSONAL HISTORY OF PULMONARY EMBOLISM: ICD-10-CM

## 2023-01-01 DIAGNOSIS — N18.32 CHRONIC KIDNEY DISEASE, STAGE 3B: ICD-10-CM

## 2023-01-01 DIAGNOSIS — M81.0 AGE-RELATED OSTEOPOROSIS W/OUT CURRENT PATHOLOGICAL FRACTURE: ICD-10-CM

## 2023-01-01 DIAGNOSIS — R11.2 NAUSEA WITH VOMITING, UNSPECIFIED: ICD-10-CM

## 2023-01-01 DIAGNOSIS — C34.92 MALIGNANT NEOPLASM OF UNSPECIFIED PART OF LEFT BRONCHUS OR LUNG: ICD-10-CM

## 2023-01-01 DIAGNOSIS — Z51.5 ENCOUNTER FOR PALLIATIVE CARE: ICD-10-CM

## 2023-01-01 DIAGNOSIS — Z29.9 ENCOUNTER FOR PROPHYLACTIC MEASURES, UNSPECIFIED: ICD-10-CM

## 2023-01-01 DIAGNOSIS — L65.9 NONSCARRING HAIR LOSS, UNSPECIFIED: ICD-10-CM

## 2023-01-01 PROBLEM — M25.551 HIP PAIN, BILATERAL: Status: ACTIVE | Noted: 2022-11-22

## 2023-01-01 LAB
25(OH)D3 SERPL-MCNC: 42.4 NG/ML
ALBUMIN SERPL ELPH-MCNC: 3.1 G/DL — LOW (ref 3.3–5)
ALBUMIN SERPL ELPH-MCNC: 3.7 G/DL — SIGNIFICANT CHANGE UP (ref 3.3–5)
ALBUMIN SERPL ELPH-MCNC: 3.8 G/DL
ALBUMIN SERPL ELPH-MCNC: 3.9 G/DL
ALBUMIN SERPL ELPH-MCNC: 4 G/DL
ALP BLD-CCNC: 154 U/L
ALP BLD-CCNC: 57 U/L
ALP BLD-CCNC: 82 U/L
ALP SERPL-CCNC: 97 U/L — SIGNIFICANT CHANGE UP (ref 40–120)
ALT FLD-CCNC: 15 U/L — SIGNIFICANT CHANGE UP (ref 4–33)
ALT SERPL-CCNC: 17 U/L
ALT SERPL-CCNC: 18 U/L
ALT SERPL-CCNC: 23 U/L
ANION GAP SERPL CALC-SCNC: 10 MMOL/L — SIGNIFICANT CHANGE UP (ref 5–17)
ANION GAP SERPL CALC-SCNC: 10 MMOL/L — SIGNIFICANT CHANGE UP (ref 5–17)
ANION GAP SERPL CALC-SCNC: 11 MMOL/L
ANION GAP SERPL CALC-SCNC: 11 MMOL/L
ANION GAP SERPL CALC-SCNC: 11 MMOL/L — SIGNIFICANT CHANGE UP (ref 7–14)
ANION GAP SERPL CALC-SCNC: 12 MMOL/L — SIGNIFICANT CHANGE UP (ref 7–14)
ANION GAP SERPL CALC-SCNC: 12 MMOL/L — SIGNIFICANT CHANGE UP (ref 7–14)
ANION GAP SERPL CALC-SCNC: 14 MMOL/L — SIGNIFICANT CHANGE UP (ref 5–17)
ANION GAP SERPL CALC-SCNC: 14 MMOL/L — SIGNIFICANT CHANGE UP (ref 5–17)
ANION GAP SERPL CALC-SCNC: 9 MMOL/L
ANION GAP SERPL CALC-SCNC: 9 MMOL/L — SIGNIFICANT CHANGE UP (ref 5–17)
ANION GAP SERPL CALC-SCNC: 9 MMOL/L — SIGNIFICANT CHANGE UP (ref 7–14)
ANION GAP SERPL CALC-SCNC: 9 MMOL/L — SIGNIFICANT CHANGE UP (ref 7–14)
ANISOCYTOSIS BLD QL: SLIGHT — SIGNIFICANT CHANGE UP
APPEARANCE: ABNORMAL
AST SERPL-CCNC: 16 U/L — SIGNIFICANT CHANGE UP (ref 4–32)
AST SERPL-CCNC: 20 U/L
AST SERPL-CCNC: 21 U/L
AST SERPL-CCNC: 25 U/L
BASE EXCESS BLDV CALC-SCNC: -10.6 MMOL/L — LOW (ref -2–3)
BASE EXCESS BLDV CALC-SCNC: -3.9 MMOL/L — LOW (ref -2–3)
BASE EXCESS BLDV CALC-SCNC: -6.5 MMOL/L — LOW (ref -2–3)
BASE EXCESS BLDV CALC-SCNC: -8.4 MMOL/L — LOW (ref -2–3)
BASE EXCESS BLDV CALC-SCNC: -8.6 MMOL/L — LOW (ref -2–3)
BASOPHILS # BLD AUTO: 0.02 K/UL — SIGNIFICANT CHANGE UP (ref 0–0.2)
BASOPHILS # BLD AUTO: 0.03 K/UL
BASOPHILS # BLD AUTO: 0.03 K/UL — SIGNIFICANT CHANGE UP (ref 0–0.2)
BASOPHILS # BLD AUTO: 0.03 K/UL — SIGNIFICANT CHANGE UP (ref 0–0.2)
BASOPHILS # BLD AUTO: 0.04 K/UL — SIGNIFICANT CHANGE UP (ref 0–0.2)
BASOPHILS # BLD AUTO: 0.04 K/UL — SIGNIFICANT CHANGE UP (ref 0–0.2)
BASOPHILS # BLD AUTO: 0.07 K/UL — SIGNIFICANT CHANGE UP (ref 0–0.2)
BASOPHILS NFR BLD AUTO: 0.7 % — SIGNIFICANT CHANGE UP (ref 0–2)
BASOPHILS NFR BLD AUTO: 0.8 %
BASOPHILS NFR BLD AUTO: 1.1 % — SIGNIFICANT CHANGE UP (ref 0–2)
BASOPHILS NFR BLD AUTO: 1.1 % — SIGNIFICANT CHANGE UP (ref 0–2)
BASOPHILS NFR BLD AUTO: 2.6 % — HIGH (ref 0–2)
BILIRUB SERPL-MCNC: 0.2 MG/DL
BILIRUB SERPL-MCNC: <0.2 MG/DL
BILIRUB SERPL-MCNC: <0.2 MG/DL
BILIRUB SERPL-MCNC: <0.2 MG/DL — SIGNIFICANT CHANGE UP (ref 0.2–1.2)
BILIRUBIN URINE: ABNORMAL
BLOOD GAS VENOUS COMPREHENSIVE RESULT: SIGNIFICANT CHANGE UP
BLOOD URINE: NEGATIVE
BUN SERPL-MCNC: 17 MG/DL — SIGNIFICANT CHANGE UP (ref 7–23)
BUN SERPL-MCNC: 18 MG/DL — SIGNIFICANT CHANGE UP (ref 7–23)
BUN SERPL-MCNC: 24 MG/DL — HIGH (ref 7–23)
BUN SERPL-MCNC: 24 MG/DL — HIGH (ref 7–23)
BUN SERPL-MCNC: 28 MG/DL
BUN SERPL-MCNC: 28 MG/DL — HIGH (ref 7–23)
BUN SERPL-MCNC: 28 MG/DL — HIGH (ref 7–23)
BUN SERPL-MCNC: 31 MG/DL — HIGH (ref 7–23)
BUN SERPL-MCNC: 31 MG/DL — HIGH (ref 7–23)
BUN SERPL-MCNC: 33 MG/DL
BUN SERPL-MCNC: 34 MG/DL — HIGH (ref 7–23)
BUN SERPL-MCNC: 40 MG/DL
BUN SERPL-MCNC: 41 MG/DL — HIGH (ref 7–23)
C DIFF BY PCR RESULT: SIGNIFICANT CHANGE UP
CALCIUM SERPL-MCNC: 7.7 MG/DL — LOW (ref 8.4–10.5)
CALCIUM SERPL-MCNC: 7.7 MG/DL — LOW (ref 8.4–10.5)
CALCIUM SERPL-MCNC: 7.8 MG/DL — LOW (ref 8.4–10.5)
CALCIUM SERPL-MCNC: 8 MG/DL — LOW (ref 8.4–10.5)
CALCIUM SERPL-MCNC: 8.3 MG/DL — LOW (ref 8.4–10.5)
CALCIUM SERPL-MCNC: 8.5 MG/DL
CALCIUM SERPL-MCNC: 8.8 MG/DL — SIGNIFICANT CHANGE UP (ref 8.4–10.5)
CALCIUM SERPL-MCNC: 8.8 MG/DL — SIGNIFICANT CHANGE UP (ref 8.4–10.5)
CALCIUM SERPL-MCNC: 9.2 MG/DL — SIGNIFICANT CHANGE UP (ref 8.4–10.5)
CALCIUM SERPL-MCNC: 9.2 MG/DL — SIGNIFICANT CHANGE UP (ref 8.4–10.5)
CALCIUM SERPL-MCNC: 9.3 MG/DL — SIGNIFICANT CHANGE UP (ref 8.4–10.5)
CALCIUM SERPL-MCNC: 9.3 MG/DL — SIGNIFICANT CHANGE UP (ref 8.4–10.5)
CALCIUM SERPL-MCNC: 9.4 MG/DL
CALCIUM SERPL-MCNC: 9.7 MG/DL
CALCIUM SERPL-MCNC: 9.7 MG/DL — SIGNIFICANT CHANGE UP (ref 8.4–10.5)
CALCIUM SERPL-MCNC: 9.7 MG/DL — SIGNIFICANT CHANGE UP (ref 8.4–10.5)
CDIFF BY PCR: NOT DETECTED
CEA SERPL-MCNC: 34.6 NG/ML
CEA SERPL-MCNC: 35.2 NG/ML
CHLORIDE BLDV-SCNC: 110 MMOL/L — HIGH (ref 96–108)
CHLORIDE BLDV-SCNC: 111 MMOL/L — HIGH (ref 96–108)
CHLORIDE BLDV-SCNC: 112 MMOL/L — HIGH (ref 96–108)
CHLORIDE BLDV-SCNC: 113 MMOL/L — HIGH (ref 96–108)
CHLORIDE BLDV-SCNC: 113 MMOL/L — HIGH (ref 96–108)
CHLORIDE SERPL-SCNC: 108 MMOL/L — SIGNIFICANT CHANGE UP (ref 96–108)
CHLORIDE SERPL-SCNC: 108 MMOL/L — SIGNIFICANT CHANGE UP (ref 96–108)
CHLORIDE SERPL-SCNC: 109 MMOL/L
CHLORIDE SERPL-SCNC: 109 MMOL/L
CHLORIDE SERPL-SCNC: 109 MMOL/L — HIGH (ref 96–108)
CHLORIDE SERPL-SCNC: 110 MMOL/L
CHLORIDE SERPL-SCNC: 110 MMOL/L — HIGH (ref 96–108)
CHLORIDE SERPL-SCNC: 110 MMOL/L — HIGH (ref 96–108)
CHLORIDE SERPL-SCNC: 111 MMOL/L — HIGH (ref 98–107)
CHLORIDE SERPL-SCNC: 113 MMOL/L — HIGH (ref 98–107)
CHLORIDE SERPL-SCNC: 113 MMOL/L — HIGH (ref 98–107)
CHLORIDE SERPL-SCNC: 114 MMOL/L — HIGH (ref 98–107)
CHLORIDE SERPL-SCNC: 114 MMOL/L — HIGH (ref 98–107)
CO2 BLDV-SCNC: 18.1 MMOL/L — LOW (ref 22–26)
CO2 BLDV-SCNC: 18.5 MMOL/L — LOW (ref 22–26)
CO2 BLDV-SCNC: 18.9 MMOL/L — LOW (ref 22–26)
CO2 BLDV-SCNC: 19.1 MMOL/L — LOW (ref 22–26)
CO2 BLDV-SCNC: 22.8 MMOL/L — SIGNIFICANT CHANGE UP (ref 22–26)
CO2 SERPL-SCNC: 15 MMOL/L — LOW (ref 22–31)
CO2 SERPL-SCNC: 16 MMOL/L — LOW (ref 22–31)
CO2 SERPL-SCNC: 17 MMOL/L — LOW (ref 22–31)
CO2 SERPL-SCNC: 18 MMOL/L — LOW (ref 22–31)
CO2 SERPL-SCNC: 19 MMOL/L — LOW (ref 22–31)
CO2 SERPL-SCNC: 19 MMOL/L — LOW (ref 22–31)
CO2 SERPL-SCNC: 21 MMOL/L
CO2 SERPL-SCNC: 21 MMOL/L
CO2 SERPL-SCNC: 21 MMOL/L — LOW (ref 22–31)
CO2 SERPL-SCNC: 22 MMOL/L — SIGNIFICANT CHANGE UP (ref 22–31)
CO2 SERPL-SCNC: 23 MMOL/L
CO2 SERPL-SCNC: 23 MMOL/L — SIGNIFICANT CHANGE UP (ref 22–31)
CO2 SERPL-SCNC: 23 MMOL/L — SIGNIFICANT CHANGE UP (ref 22–31)
COLOR: NORMAL
CREAT SERPL-MCNC: 0.98 MG/DL — SIGNIFICANT CHANGE UP (ref 0.5–1.3)
CREAT SERPL-MCNC: 1.07 MG/DL — SIGNIFICANT CHANGE UP (ref 0.5–1.3)
CREAT SERPL-MCNC: 1.14 MG/DL — SIGNIFICANT CHANGE UP (ref 0.5–1.3)
CREAT SERPL-MCNC: 1.16 MG/DL
CREAT SERPL-MCNC: 1.2 MG/DL — SIGNIFICANT CHANGE UP (ref 0.5–1.3)
CREAT SERPL-MCNC: 1.21 MG/DL
CREAT SERPL-MCNC: 1.32 MG/DL — HIGH (ref 0.5–1.3)
CREAT SERPL-MCNC: 1.32 MG/DL — HIGH (ref 0.5–1.3)
CREAT SERPL-MCNC: 1.39 MG/DL — HIGH (ref 0.5–1.3)
CREAT SERPL-MCNC: 1.39 MG/DL — HIGH (ref 0.5–1.3)
CREAT SERPL-MCNC: 1.61 MG/DL — HIGH (ref 0.5–1.3)
CREAT SERPL-MCNC: 1.61 MG/DL — HIGH (ref 0.5–1.3)
CREAT SERPL-MCNC: 1.65 MG/DL
CREAT SERPL-MCNC: 1.84 MG/DL — HIGH (ref 0.5–1.3)
CREAT SPEC-SCNC: 210 MG/DL
CREAT/PROT UR: 0.5 RATIO
CULTURE RESULTS: SIGNIFICANT CHANGE UP
EGFR: 27 ML/MIN/1.73M2 — LOW
EGFR: 31 ML/MIN/1.73M2
EGFR: 32 ML/MIN/1.73M2 — LOW
EGFR: 32 ML/MIN/1.73M2 — LOW
EGFR: 38 ML/MIN/1.73M2 — LOW
EGFR: 38 ML/MIN/1.73M2 — LOW
EGFR: 40 ML/MIN/1.73M2 — LOW
EGFR: 40 ML/MIN/1.73M2 — LOW
EGFR: 45 ML/MIN/1.73M2
EGFR: 45 ML/MIN/1.73M2 — LOW
EGFR: 47 ML/MIN/1.73M2
EGFR: 48 ML/MIN/1.73M2 — LOW
EGFR: 52 ML/MIN/1.73M2 — LOW
EGFR: 58 ML/MIN/1.73M2 — LOW
EOSINOPHIL # BLD AUTO: 0 K/UL — SIGNIFICANT CHANGE UP (ref 0–0.5)
EOSINOPHIL # BLD AUTO: 0.06 K/UL
EOSINOPHIL # BLD AUTO: 0.07 K/UL — SIGNIFICANT CHANGE UP (ref 0–0.5)
EOSINOPHIL # BLD AUTO: 0.08 K/UL — SIGNIFICANT CHANGE UP (ref 0–0.5)
EOSINOPHIL # BLD AUTO: 0.08 K/UL — SIGNIFICANT CHANGE UP (ref 0–0.5)
EOSINOPHIL # BLD AUTO: 0.26 K/UL — SIGNIFICANT CHANGE UP (ref 0–0.5)
EOSINOPHIL # BLD AUTO: 0.26 K/UL — SIGNIFICANT CHANGE UP (ref 0–0.5)
EOSINOPHIL NFR BLD AUTO: 0 % — SIGNIFICANT CHANGE UP (ref 0–6)
EOSINOPHIL NFR BLD AUTO: 1.7 %
EOSINOPHIL NFR BLD AUTO: 1.8 % — SIGNIFICANT CHANGE UP (ref 0–6)
EOSINOPHIL NFR BLD AUTO: 1.8 % — SIGNIFICANT CHANGE UP (ref 0–6)
EOSINOPHIL NFR BLD AUTO: 2.4 % — SIGNIFICANT CHANGE UP (ref 0–6)
EOSINOPHIL NFR BLD AUTO: 7.4 % — HIGH (ref 0–6)
EOSINOPHIL NFR BLD AUTO: 7.4 % — HIGH (ref 0–6)
FOLATE SERPL-MCNC: >20 NG/ML
GAS PNL BLDV: 137 MMOL/L — SIGNIFICANT CHANGE UP (ref 136–145)
GAS PNL BLDV: 138 MMOL/L — SIGNIFICANT CHANGE UP (ref 136–145)
GAS PNL BLDV: 138 MMOL/L — SIGNIFICANT CHANGE UP (ref 136–145)
GAS PNL BLDV: 139 MMOL/L — SIGNIFICANT CHANGE UP (ref 136–145)
GAS PNL BLDV: 139 MMOL/L — SIGNIFICANT CHANGE UP (ref 136–145)
GAS PNL BLDV: SIGNIFICANT CHANGE UP
GAS PNL BLDV: SIGNIFICANT CHANGE UP
GI PCR PANEL: NOT DETECTED
GIANT PLATELETS BLD QL SMEAR: PRESENT — SIGNIFICANT CHANGE UP
GLUCOSE BLDV-MCNC: 74 MG/DL — SIGNIFICANT CHANGE UP (ref 70–99)
GLUCOSE BLDV-MCNC: 77 MG/DL — SIGNIFICANT CHANGE UP (ref 70–99)
GLUCOSE BLDV-MCNC: 80 MG/DL — SIGNIFICANT CHANGE UP (ref 70–99)
GLUCOSE BLDV-MCNC: 83 MG/DL — SIGNIFICANT CHANGE UP (ref 70–99)
GLUCOSE BLDV-MCNC: 90 MG/DL — SIGNIFICANT CHANGE UP (ref 70–99)
GLUCOSE QUALITATIVE U: NEGATIVE MG/DL
GLUCOSE SERPL-MCNC: 114 MG/DL
GLUCOSE SERPL-MCNC: 115 MG/DL
GLUCOSE SERPL-MCNC: 116 MG/DL — HIGH (ref 70–99)
GLUCOSE SERPL-MCNC: 116 MG/DL — HIGH (ref 70–99)
GLUCOSE SERPL-MCNC: 117 MG/DL — HIGH (ref 70–99)
GLUCOSE SERPL-MCNC: 117 MG/DL — HIGH (ref 70–99)
GLUCOSE SERPL-MCNC: 140 MG/DL — HIGH (ref 70–99)
GLUCOSE SERPL-MCNC: 140 MG/DL — HIGH (ref 70–99)
GLUCOSE SERPL-MCNC: 76 MG/DL — SIGNIFICANT CHANGE UP (ref 70–99)
GLUCOSE SERPL-MCNC: 81 MG/DL — SIGNIFICANT CHANGE UP (ref 70–99)
GLUCOSE SERPL-MCNC: 83 MG/DL — SIGNIFICANT CHANGE UP (ref 70–99)
GLUCOSE SERPL-MCNC: 83 MG/DL — SIGNIFICANT CHANGE UP (ref 70–99)
GLUCOSE SERPL-MCNC: 84 MG/DL
GLUCOSE SERPL-MCNC: 84 MG/DL — SIGNIFICANT CHANGE UP (ref 70–99)
GLUCOSE SERPL-MCNC: 87 MG/DL — SIGNIFICANT CHANGE UP (ref 70–99)
GLUCOSE SERPL-MCNC: 96 MG/DL — SIGNIFICANT CHANGE UP (ref 70–99)
HCO3 BLDV-SCNC: 17 MMOL/L — LOW (ref 22–29)
HCO3 BLDV-SCNC: 17 MMOL/L — LOW (ref 22–29)
HCO3 BLDV-SCNC: 18 MMOL/L — LOW (ref 22–29)
HCO3 BLDV-SCNC: 18 MMOL/L — LOW (ref 22–29)
HCO3 BLDV-SCNC: 22 MMOL/L — SIGNIFICANT CHANGE UP (ref 22–29)
HCT VFR BLD CALC: 26.3 % — LOW (ref 34.5–45)
HCT VFR BLD CALC: 26.4 % — LOW (ref 34.5–45)
HCT VFR BLD CALC: 26.4 % — LOW (ref 34.5–45)
HCT VFR BLD CALC: 26.8 % — LOW (ref 34.5–45)
HCT VFR BLD CALC: 26.8 % — LOW (ref 34.5–45)
HCT VFR BLD CALC: 27.6 % — LOW (ref 34.5–45)
HCT VFR BLD CALC: 28.8 % — LOW (ref 34.5–45)
HCT VFR BLD CALC: 28.8 % — LOW (ref 34.5–45)
HCT VFR BLD CALC: 29.4 % — LOW (ref 34.5–45)
HCT VFR BLD CALC: 29.7 %
HCT VFR BLD CALC: 29.7 % — LOW (ref 34.5–45)
HCT VFR BLD CALC: 30.2 % — LOW (ref 34.5–45)
HCT VFR BLDA CALC: 27 % — LOW (ref 34.5–46.5)
HCT VFR BLDA CALC: 28 % — LOW (ref 34.5–46.5)
HCT VFR BLDA CALC: 29 % — LOW (ref 34.5–46.5)
HCT VFR BLDA CALC: 30 % — LOW (ref 34.5–46.5)
HCT VFR BLDA CALC: 31 % — LOW (ref 34.5–46.5)
HGB BLD CALC-MCNC: 10.4 G/DL — LOW (ref 11.7–16.1)
HGB BLD CALC-MCNC: 8.9 G/DL — LOW (ref 11.7–16.1)
HGB BLD CALC-MCNC: 9.3 G/DL — LOW (ref 11.7–16.1)
HGB BLD CALC-MCNC: 9.5 G/DL — LOW (ref 11.7–16.1)
HGB BLD CALC-MCNC: 9.9 G/DL — LOW (ref 11.7–16.1)
HGB BLD-MCNC: 8.3 G/DL — LOW (ref 11.5–15.5)
HGB BLD-MCNC: 8.3 G/DL — LOW (ref 11.5–15.5)
HGB BLD-MCNC: 8.5 G/DL — LOW (ref 11.5–15.5)
HGB BLD-MCNC: 8.5 G/DL — LOW (ref 11.5–15.5)
HGB BLD-MCNC: 8.7 G/DL — LOW (ref 11.5–15.5)
HGB BLD-MCNC: 9 G/DL
HGB BLD-MCNC: 9 G/DL — LOW (ref 11.5–15.5)
HGB BLD-MCNC: 9.1 G/DL — LOW (ref 11.5–15.5)
HGB BLD-MCNC: 9.4 G/DL — LOW (ref 11.5–15.5)
HGB BLD-MCNC: 9.7 G/DL — LOW (ref 11.5–15.5)
IANC: 1.79 K/UL — LOW (ref 1.8–7.4)
IMM GRANULOCYTES NFR BLD AUTO: 0 %
IMM GRANULOCYTES NFR BLD AUTO: 0 % — SIGNIFICANT CHANGE UP (ref 0–0.9)
IMM GRANULOCYTES NFR BLD AUTO: 0 % — SIGNIFICANT CHANGE UP (ref 0–0.9)
IMM GRANULOCYTES NFR BLD AUTO: 0.2 % — SIGNIFICANT CHANGE UP (ref 0–0.9)
IMM GRANULOCYTES NFR BLD AUTO: 0.2 % — SIGNIFICANT CHANGE UP (ref 0–0.9)
IMM GRANULOCYTES NFR BLD AUTO: 0.3 % — SIGNIFICANT CHANGE UP (ref 0–0.9)
KETONES URINE: ABNORMAL MG/DL
LACTATE BLDV-MCNC: 0.7 MMOL/L — SIGNIFICANT CHANGE UP (ref 0.5–2)
LACTATE BLDV-MCNC: 0.8 MMOL/L — SIGNIFICANT CHANGE UP (ref 0.5–2)
LACTATE BLDV-MCNC: 1 MMOL/L — SIGNIFICANT CHANGE UP (ref 0.5–2)
LEUKOCYTE ESTERASE URINE: ABNORMAL
LIDOCAIN IGE QN: 25 U/L — SIGNIFICANT CHANGE UP (ref 7–60)
LYMPHOCYTES # BLD AUTO: 0.59 K/UL — LOW (ref 1–3.3)
LYMPHOCYTES # BLD AUTO: 0.66 K/UL
LYMPHOCYTES # BLD AUTO: 0.68 K/UL — LOW (ref 1–3.3)
LYMPHOCYTES # BLD AUTO: 0.68 K/UL — LOW (ref 1–3.3)
LYMPHOCYTES # BLD AUTO: 0.75 K/UL — LOW (ref 1–3.3)
LYMPHOCYTES # BLD AUTO: 0.75 K/UL — LOW (ref 1–3.3)
LYMPHOCYTES # BLD AUTO: 0.82 K/UL — LOW (ref 1–3.3)
LYMPHOCYTES # BLD AUTO: 16.8 % — SIGNIFICANT CHANGE UP (ref 13–44)
LYMPHOCYTES # BLD AUTO: 16.8 % — SIGNIFICANT CHANGE UP (ref 13–44)
LYMPHOCYTES # BLD AUTO: 19.4 % — SIGNIFICANT CHANGE UP (ref 13–44)
LYMPHOCYTES # BLD AUTO: 19.4 % — SIGNIFICANT CHANGE UP (ref 13–44)
LYMPHOCYTES # BLD AUTO: 21.9 % — SIGNIFICANT CHANGE UP (ref 13–44)
LYMPHOCYTES # BLD AUTO: 28.6 % — SIGNIFICANT CHANGE UP (ref 13–44)
LYMPHOCYTES NFR BLD AUTO: 18.7 %
MACROCYTES BLD QL: SLIGHT — SIGNIFICANT CHANGE UP
MAGNESIUM SERPL-MCNC: 1.6 MG/DL — SIGNIFICANT CHANGE UP (ref 1.6–2.6)
MAGNESIUM SERPL-MCNC: 1.8 MG/DL — SIGNIFICANT CHANGE UP (ref 1.6–2.6)
MAGNESIUM SERPL-MCNC: 1.9 MG/DL — SIGNIFICANT CHANGE UP (ref 1.6–2.6)
MAGNESIUM SERPL-MCNC: 2 MG/DL — SIGNIFICANT CHANGE UP (ref 1.6–2.6)
MAGNESIUM SERPL-MCNC: 2.1 MG/DL — SIGNIFICANT CHANGE UP (ref 1.6–2.6)
MAGNESIUM SERPL-MCNC: 2.1 MG/DL — SIGNIFICANT CHANGE UP (ref 1.6–2.6)
MAGNESIUM SERPL-MCNC: 2.2 MG/DL — SIGNIFICANT CHANGE UP (ref 1.6–2.6)
MAGNESIUM SERPL-MCNC: 2.3 MG/DL
MAGNESIUM SERPL-MCNC: 2.3 MG/DL — SIGNIFICANT CHANGE UP (ref 1.6–2.6)
MAN DIFF?: NORMAL
MCHC RBC-ENTMCNC: 29.5 PG — SIGNIFICANT CHANGE UP (ref 27–34)
MCHC RBC-ENTMCNC: 29.6 PG — SIGNIFICANT CHANGE UP (ref 27–34)
MCHC RBC-ENTMCNC: 29.7 PG — SIGNIFICANT CHANGE UP (ref 27–34)
MCHC RBC-ENTMCNC: 29.7 PG — SIGNIFICANT CHANGE UP (ref 27–34)
MCHC RBC-ENTMCNC: 29.8 PG — SIGNIFICANT CHANGE UP (ref 27–34)
MCHC RBC-ENTMCNC: 29.9 PG — SIGNIFICANT CHANGE UP (ref 27–34)
MCHC RBC-ENTMCNC: 30.1 PG
MCHC RBC-ENTMCNC: 30.1 PG — SIGNIFICANT CHANGE UP (ref 27–34)
MCHC RBC-ENTMCNC: 30.3 GM/DL
MCHC RBC-ENTMCNC: 31 G/DL — LOW (ref 32–36)
MCHC RBC-ENTMCNC: 31.4 G/DL — LOW (ref 32–36)
MCHC RBC-ENTMCNC: 31.4 G/DL — LOW (ref 32–36)
MCHC RBC-ENTMCNC: 31.6 G/DL — LOW (ref 32–36)
MCHC RBC-ENTMCNC: 31.6 G/DL — LOW (ref 32–36)
MCHC RBC-ENTMCNC: 31.6 GM/DL — LOW (ref 32–36)
MCHC RBC-ENTMCNC: 31.7 G/DL — LOW (ref 32–36)
MCHC RBC-ENTMCNC: 31.7 G/DL — LOW (ref 32–36)
MCHC RBC-ENTMCNC: 32.1 GM/DL — SIGNIFICANT CHANGE UP (ref 32–36)
MCHC RBC-ENTMCNC: 32.6 GM/DL — SIGNIFICANT CHANGE UP (ref 32–36)
MCHC RBC-ENTMCNC: 33.1 GM/DL — SIGNIFICANT CHANGE UP (ref 32–36)
MCV RBC AUTO: 90.4 FL — SIGNIFICANT CHANGE UP (ref 80–100)
MCV RBC AUTO: 90.8 FL — SIGNIFICANT CHANGE UP (ref 80–100)
MCV RBC AUTO: 92.9 FL — SIGNIFICANT CHANGE UP (ref 80–100)
MCV RBC AUTO: 93.1 FL — SIGNIFICANT CHANGE UP (ref 80–100)
MCV RBC AUTO: 94.1 FL — SIGNIFICANT CHANGE UP (ref 80–100)
MCV RBC AUTO: 94.1 FL — SIGNIFICANT CHANGE UP (ref 80–100)
MCV RBC AUTO: 94.4 FL — SIGNIFICANT CHANGE UP (ref 80–100)
MCV RBC AUTO: 94.4 FL — SIGNIFICANT CHANGE UP (ref 80–100)
MCV RBC AUTO: 95 FL — SIGNIFICANT CHANGE UP (ref 80–100)
MCV RBC AUTO: 95 FL — SIGNIFICANT CHANGE UP (ref 80–100)
MCV RBC AUTO: 97.4 FL — SIGNIFICANT CHANGE UP (ref 80–100)
MCV RBC AUTO: 99.3 FL
MONOCYTES # BLD AUTO: 0.19 K/UL — SIGNIFICANT CHANGE UP (ref 0–0.9)
MONOCYTES # BLD AUTO: 0.32 K/UL
MONOCYTES # BLD AUTO: 0.36 K/UL — SIGNIFICANT CHANGE UP (ref 0–0.9)
MONOCYTES # BLD AUTO: 0.4 K/UL — SIGNIFICANT CHANGE UP (ref 0–0.9)
MONOCYTES # BLD AUTO: 0.4 K/UL — SIGNIFICANT CHANGE UP (ref 0–0.9)
MONOCYTES NFR BLD AUTO: 10.3 % — SIGNIFICANT CHANGE UP (ref 2–14)
MONOCYTES NFR BLD AUTO: 10.3 % — SIGNIFICANT CHANGE UP (ref 2–14)
MONOCYTES NFR BLD AUTO: 12.5 % — SIGNIFICANT CHANGE UP (ref 2–14)
MONOCYTES NFR BLD AUTO: 7 % — SIGNIFICANT CHANGE UP (ref 2–14)
MONOCYTES NFR BLD AUTO: 9 % — SIGNIFICANT CHANGE UP (ref 2–14)
MONOCYTES NFR BLD AUTO: 9 % — SIGNIFICANT CHANGE UP (ref 2–14)
MONOCYTES NFR BLD AUTO: 9.1 %
NEUTROPHILS # BLD AUTO: 1.59 K/UL — LOW (ref 1.8–7.4)
NEUTROPHILS # BLD AUTO: 1.83 K/UL — SIGNIFICANT CHANGE UP (ref 1.8–7.4)
NEUTROPHILS # BLD AUTO: 2.17 K/UL — SIGNIFICANT CHANGE UP (ref 1.8–7.4)
NEUTROPHILS # BLD AUTO: 2.17 K/UL — SIGNIFICANT CHANGE UP (ref 1.8–7.4)
NEUTROPHILS # BLD AUTO: 2.46 K/UL
NEUTROPHILS # BLD AUTO: 3.19 K/UL — SIGNIFICANT CHANGE UP (ref 1.8–7.4)
NEUTROPHILS # BLD AUTO: 3.19 K/UL — SIGNIFICANT CHANGE UP (ref 1.8–7.4)
NEUTROPHILS NFR BLD AUTO: 55.5 % — SIGNIFICANT CHANGE UP (ref 43–77)
NEUTROPHILS NFR BLD AUTO: 61.8 % — SIGNIFICANT CHANGE UP (ref 43–77)
NEUTROPHILS NFR BLD AUTO: 61.8 % — SIGNIFICANT CHANGE UP (ref 43–77)
NEUTROPHILS NFR BLD AUTO: 67.6 % — SIGNIFICANT CHANGE UP (ref 43–77)
NEUTROPHILS NFR BLD AUTO: 69.7 %
NEUTROPHILS NFR BLD AUTO: 71.5 % — SIGNIFICANT CHANGE UP (ref 43–77)
NEUTROPHILS NFR BLD AUTO: 71.5 % — SIGNIFICANT CHANGE UP (ref 43–77)
NITRITE URINE: NEGATIVE
NRBC # BLD: 0 /100 WBCS — SIGNIFICANT CHANGE UP (ref 0–0)
NRBC # FLD: 0 K/UL — SIGNIFICANT CHANGE UP (ref 0–0)
OVALOCYTES BLD QL SMEAR: SIGNIFICANT CHANGE UP
PCO2 BLDV: 31 MMHG — LOW (ref 39–52)
PCO2 BLDV: 37 MMHG — LOW (ref 39–52)
PCO2 BLDV: 39 MMHG — SIGNIFICANT CHANGE UP (ref 39–52)
PCO2 BLDV: 40 MMHG — SIGNIFICANT CHANGE UP (ref 39–52)
PCO2 BLDV: 43 MMHG — SIGNIFICANT CHANGE UP (ref 39–52)
PH BLDV: 7.2 — LOW (ref 7.32–7.43)
PH BLDV: 7.27 — LOW (ref 7.32–7.43)
PH BLDV: 7.28 — LOW (ref 7.32–7.43)
PH BLDV: 7.34 — SIGNIFICANT CHANGE UP (ref 7.32–7.43)
PH BLDV: 7.37 — SIGNIFICANT CHANGE UP (ref 7.32–7.43)
PH URINE: 5.5
PHOSPHATE SERPL-MCNC: 1.7 MG/DL — LOW (ref 2.5–4.5)
PHOSPHATE SERPL-MCNC: 1.8 MG/DL — LOW (ref 2.5–4.5)
PHOSPHATE SERPL-MCNC: 1.9 MG/DL — LOW (ref 2.5–4.5)
PHOSPHATE SERPL-MCNC: 2.3 MG/DL — LOW (ref 2.5–4.5)
PHOSPHATE SERPL-MCNC: 2.4 MG/DL — LOW (ref 2.5–4.5)
PLAT MORPH BLD: NORMAL — SIGNIFICANT CHANGE UP
PLATELET # BLD AUTO: 134 K/UL — LOW (ref 150–400)
PLATELET # BLD AUTO: 147 K/UL
PLATELET # BLD AUTO: 154 K/UL — SIGNIFICANT CHANGE UP (ref 150–400)
PLATELET # BLD AUTO: 162 K/UL — SIGNIFICANT CHANGE UP (ref 150–400)
PLATELET # BLD AUTO: 172 K/UL — SIGNIFICANT CHANGE UP (ref 150–400)
PLATELET # BLD AUTO: 175 K/UL — SIGNIFICANT CHANGE UP (ref 150–400)
PLATELET # BLD AUTO: 200 K/UL — SIGNIFICANT CHANGE UP (ref 150–400)
PLATELET # BLD AUTO: 200 K/UL — SIGNIFICANT CHANGE UP (ref 150–400)
PLATELET # BLD AUTO: 217 K/UL — SIGNIFICANT CHANGE UP (ref 150–400)
PLATELET # BLD AUTO: 217 K/UL — SIGNIFICANT CHANGE UP (ref 150–400)
PLATELET # BLD AUTO: 224 K/UL — SIGNIFICANT CHANGE UP (ref 150–400)
PLATELET # BLD AUTO: 224 K/UL — SIGNIFICANT CHANGE UP (ref 150–400)
PLATELET COUNT - ESTIMATE: NORMAL — SIGNIFICANT CHANGE UP
PO2 BLDV: 154 MMHG — HIGH (ref 25–45)
PO2 BLDV: 25 MMHG — SIGNIFICANT CHANGE UP (ref 25–45)
PO2 BLDV: 25 MMHG — SIGNIFICANT CHANGE UP (ref 25–45)
PO2 BLDV: 27 MMHG — SIGNIFICANT CHANGE UP (ref 25–45)
PO2 BLDV: 44 MMHG — SIGNIFICANT CHANGE UP (ref 25–45)
POIKILOCYTOSIS BLD QL AUTO: SIGNIFICANT CHANGE UP
POTASSIUM BLDV-SCNC: 3.5 MMOL/L — SIGNIFICANT CHANGE UP (ref 3.5–5.1)
POTASSIUM BLDV-SCNC: 3.6 MMOL/L — SIGNIFICANT CHANGE UP (ref 3.5–5.1)
POTASSIUM BLDV-SCNC: 4.4 MMOL/L — SIGNIFICANT CHANGE UP (ref 3.5–5.1)
POTASSIUM SERPL-MCNC: 3.5 MMOL/L — SIGNIFICANT CHANGE UP (ref 3.5–5.3)
POTASSIUM SERPL-MCNC: 3.5 MMOL/L — SIGNIFICANT CHANGE UP (ref 3.5–5.3)
POTASSIUM SERPL-MCNC: 3.6 MMOL/L — SIGNIFICANT CHANGE UP (ref 3.5–5.3)
POTASSIUM SERPL-MCNC: 3.7 MMOL/L — SIGNIFICANT CHANGE UP (ref 3.5–5.3)
POTASSIUM SERPL-MCNC: 4.2 MMOL/L — SIGNIFICANT CHANGE UP (ref 3.5–5.3)
POTASSIUM SERPL-MCNC: 4.2 MMOL/L — SIGNIFICANT CHANGE UP (ref 3.5–5.3)
POTASSIUM SERPL-MCNC: 4.6 MMOL/L — SIGNIFICANT CHANGE UP (ref 3.5–5.3)
POTASSIUM SERPL-MCNC: 4.7 MMOL/L — SIGNIFICANT CHANGE UP (ref 3.5–5.3)
POTASSIUM SERPL-MCNC: 4.7 MMOL/L — SIGNIFICANT CHANGE UP (ref 3.5–5.3)
POTASSIUM SERPL-MCNC: 4.8 MMOL/L — SIGNIFICANT CHANGE UP (ref 3.5–5.3)
POTASSIUM SERPL-MCNC: 4.8 MMOL/L — SIGNIFICANT CHANGE UP (ref 3.5–5.3)
POTASSIUM SERPL-MCNC: 5.3 MMOL/L — SIGNIFICANT CHANGE UP (ref 3.5–5.3)
POTASSIUM SERPL-MCNC: 5.3 MMOL/L — SIGNIFICANT CHANGE UP (ref 3.5–5.3)
POTASSIUM SERPL-SCNC: 3.5 MMOL/L — SIGNIFICANT CHANGE UP (ref 3.5–5.3)
POTASSIUM SERPL-SCNC: 3.5 MMOL/L — SIGNIFICANT CHANGE UP (ref 3.5–5.3)
POTASSIUM SERPL-SCNC: 3.6 MMOL/L — SIGNIFICANT CHANGE UP (ref 3.5–5.3)
POTASSIUM SERPL-SCNC: 3.7 MMOL/L — SIGNIFICANT CHANGE UP (ref 3.5–5.3)
POTASSIUM SERPL-SCNC: 4.2 MMOL/L — SIGNIFICANT CHANGE UP (ref 3.5–5.3)
POTASSIUM SERPL-SCNC: 4.2 MMOL/L — SIGNIFICANT CHANGE UP (ref 3.5–5.3)
POTASSIUM SERPL-SCNC: 4.6 MMOL/L — SIGNIFICANT CHANGE UP (ref 3.5–5.3)
POTASSIUM SERPL-SCNC: 4.7 MMOL/L — SIGNIFICANT CHANGE UP (ref 3.5–5.3)
POTASSIUM SERPL-SCNC: 4.7 MMOL/L — SIGNIFICANT CHANGE UP (ref 3.5–5.3)
POTASSIUM SERPL-SCNC: 4.8 MMOL/L
POTASSIUM SERPL-SCNC: 4.8 MMOL/L — SIGNIFICANT CHANGE UP (ref 3.5–5.3)
POTASSIUM SERPL-SCNC: 4.8 MMOL/L — SIGNIFICANT CHANGE UP (ref 3.5–5.3)
POTASSIUM SERPL-SCNC: 4.9 MMOL/L
POTASSIUM SERPL-SCNC: 4.9 MMOL/L
POTASSIUM SERPL-SCNC: 5.3 MMOL/L — SIGNIFICANT CHANGE UP (ref 3.5–5.3)
POTASSIUM SERPL-SCNC: 5.3 MMOL/L — SIGNIFICANT CHANGE UP (ref 3.5–5.3)
PROT SERPL-MCNC: 6.6 G/DL
PROT SERPL-MCNC: 6.8 G/DL
PROT SERPL-MCNC: 6.9 G/DL
PROT SERPL-MCNC: 7 G/DL — SIGNIFICANT CHANGE UP (ref 6–8.3)
PROT UR-MCNC: 109 MG/DL
PROTEIN URINE: 100 MG/DL
RBC # BLD: 2.78 M/UL — LOW (ref 3.8–5.2)
RBC # BLD: 2.78 M/UL — LOW (ref 3.8–5.2)
RBC # BLD: 2.84 M/UL — LOW (ref 3.8–5.2)
RBC # BLD: 2.84 M/UL — LOW (ref 3.8–5.2)
RBC # BLD: 2.91 M/UL — LOW (ref 3.8–5.2)
RBC # BLD: 2.99 M/UL
RBC # BLD: 3.02 M/UL — LOW (ref 3.8–5.2)
RBC # BLD: 3.04 M/UL — LOW (ref 3.8–5.2)
RBC # BLD: 3.06 M/UL — LOW (ref 3.8–5.2)
RBC # BLD: 3.06 M/UL — LOW (ref 3.8–5.2)
RBC # BLD: 3.19 M/UL — LOW (ref 3.8–5.2)
RBC # BLD: 3.25 M/UL — LOW (ref 3.8–5.2)
RBC # FLD: 13.2 % — SIGNIFICANT CHANGE UP (ref 10.3–14.5)
RBC # FLD: 13.4 %
RBC # FLD: 13.6 % — SIGNIFICANT CHANGE UP (ref 10.3–14.5)
RBC # FLD: 13.6 % — SIGNIFICANT CHANGE UP (ref 10.3–14.5)
RBC # FLD: 13.7 % — SIGNIFICANT CHANGE UP (ref 10.3–14.5)
RBC # FLD: 13.8 % — SIGNIFICANT CHANGE UP (ref 10.3–14.5)
RBC BLD AUTO: ABNORMAL
SAO2 % BLDV: 40.4 % — LOW (ref 67–88)
SAO2 % BLDV: 41.7 % — LOW (ref 67–88)
SAO2 % BLDV: 45.9 % — LOW (ref 67–88)
SAO2 % BLDV: 77.1 % — SIGNIFICANT CHANGE UP (ref 67–88)
SAO2 % BLDV: 99.3 % — HIGH (ref 67–88)
SODIUM SERPL-SCNC: 138 MMOL/L — SIGNIFICANT CHANGE UP (ref 135–145)
SODIUM SERPL-SCNC: 139 MMOL/L
SODIUM SERPL-SCNC: 140 MMOL/L — SIGNIFICANT CHANGE UP (ref 135–145)
SODIUM SERPL-SCNC: 140 MMOL/L — SIGNIFICANT CHANGE UP (ref 135–145)
SODIUM SERPL-SCNC: 141 MMOL/L — SIGNIFICANT CHANGE UP (ref 135–145)
SODIUM SERPL-SCNC: 142 MMOL/L
SODIUM SERPL-SCNC: 142 MMOL/L — SIGNIFICANT CHANGE UP (ref 135–145)
SODIUM SERPL-SCNC: 143 MMOL/L
SODIUM SERPL-SCNC: 143 MMOL/L — SIGNIFICANT CHANGE UP (ref 135–145)
SPECIFIC GRAVITY URINE: >1.03
SPECIMEN SOURCE: SIGNIFICANT CHANGE UP
TSH SERPL-ACNC: 0.92 UIU/ML
TSH SERPL-ACNC: 1.41 UIU/ML
TSH SERPL-ACNC: 1.46 UIU/ML
UROBILINOGEN URINE: 1 MG/DL
VARIANT LYMPHS # BLD: 0.9 % — SIGNIFICANT CHANGE UP (ref 0–6)
VIT B12 SERPL-MCNC: 656 PG/ML
WBC # BLD: 2.71 K/UL — LOW (ref 3.8–10.5)
WBC # BLD: 2.87 K/UL — LOW (ref 3.8–10.5)
WBC # BLD: 3.16 K/UL — LOW (ref 3.8–10.5)
WBC # BLD: 3.19 K/UL — LOW (ref 3.8–10.5)
WBC # BLD: 3.29 K/UL — LOW (ref 3.8–10.5)
WBC # BLD: 3.3 K/UL — LOW (ref 3.8–10.5)
WBC # BLD: 3.3 K/UL — LOW (ref 3.8–10.5)
WBC # BLD: 3.51 K/UL — LOW (ref 3.8–10.5)
WBC # BLD: 3.51 K/UL — LOW (ref 3.8–10.5)
WBC # BLD: 4.46 K/UL — SIGNIFICANT CHANGE UP (ref 3.8–10.5)
WBC # BLD: 4.46 K/UL — SIGNIFICANT CHANGE UP (ref 3.8–10.5)
WBC # FLD AUTO: 2.71 K/UL — LOW (ref 3.8–10.5)
WBC # FLD AUTO: 2.87 K/UL — LOW (ref 3.8–10.5)
WBC # FLD AUTO: 3.16 K/UL — LOW (ref 3.8–10.5)
WBC # FLD AUTO: 3.19 K/UL — LOW (ref 3.8–10.5)
WBC # FLD AUTO: 3.29 K/UL — LOW (ref 3.8–10.5)
WBC # FLD AUTO: 3.3 K/UL — LOW (ref 3.8–10.5)
WBC # FLD AUTO: 3.3 K/UL — LOW (ref 3.8–10.5)
WBC # FLD AUTO: 3.51 K/UL — LOW (ref 3.8–10.5)
WBC # FLD AUTO: 3.51 K/UL — LOW (ref 3.8–10.5)
WBC # FLD AUTO: 3.53 K/UL
WBC # FLD AUTO: 4.46 K/UL — SIGNIFICANT CHANGE UP (ref 3.8–10.5)
WBC # FLD AUTO: 4.46 K/UL — SIGNIFICANT CHANGE UP (ref 3.8–10.5)

## 2023-01-01 PROCEDURE — 99214 OFFICE O/P EST MOD 30 MIN: CPT

## 2023-01-01 PROCEDURE — 74176 CT ABD & PELVIS W/O CONTRAST: CPT | Mod: 26,MA

## 2023-01-01 PROCEDURE — 76376 3D RENDER W/INTRP POSTPROCES: CPT | Mod: 26

## 2023-01-01 PROCEDURE — 99232 SBSQ HOSP IP/OBS MODERATE 35: CPT | Mod: GC

## 2023-01-01 PROCEDURE — 74176 CT ABD & PELVIS W/O CONTRAST: CPT

## 2023-01-01 PROCEDURE — 99215 OFFICE O/P EST HI 40 MIN: CPT

## 2023-01-01 PROCEDURE — 71250 CT THORAX DX C-: CPT

## 2023-01-01 PROCEDURE — 71250 CT THORAX DX C-: CPT | Mod: 26

## 2023-01-01 PROCEDURE — 93306 TTE W/DOPPLER COMPLETE: CPT | Mod: 26

## 2023-01-01 PROCEDURE — 99232 SBSQ HOSP IP/OBS MODERATE 35: CPT

## 2023-01-01 PROCEDURE — 86900 BLOOD TYPING SEROLOGIC ABO: CPT

## 2023-01-01 PROCEDURE — G0444 DEPRESSION SCREEN ANNUAL: CPT

## 2023-01-01 PROCEDURE — 99497 ADVNCD CARE PLAN 30 MIN: CPT

## 2023-01-01 PROCEDURE — 99223 1ST HOSP IP/OBS HIGH 75: CPT

## 2023-01-01 PROCEDURE — 99285 EMERGENCY DEPT VISIT HI MDM: CPT

## 2023-01-01 PROCEDURE — 93970 EXTREMITY STUDY: CPT | Mod: 26

## 2023-01-01 PROCEDURE — 86850 RBC ANTIBODY SCREEN: CPT

## 2023-01-01 PROCEDURE — 99204 OFFICE O/P NEW MOD 45 MIN: CPT

## 2023-01-01 PROCEDURE — 99233 SBSQ HOSP IP/OBS HIGH 50: CPT | Mod: GC

## 2023-01-01 PROCEDURE — 93356 MYOCRD STRAIN IMG SPCKL TRCK: CPT | Mod: 26

## 2023-01-01 PROCEDURE — 74176 CT ABD & PELVIS W/O CONTRAST: CPT | Mod: 26

## 2023-01-01 PROCEDURE — 86901 BLOOD TYPING SEROLOGIC RH(D): CPT

## 2023-01-01 PROCEDURE — 99239 HOSP IP/OBS DSCHRG MGMT >30: CPT | Mod: GC

## 2023-01-01 PROCEDURE — 99203 OFFICE O/P NEW LOW 30 MIN: CPT

## 2023-01-01 RX ORDER — MIRTAZAPINE 7.5 MG/1
7.5 TABLET, FILM COATED ORAL
Qty: 90 | Refills: 3 | Status: ACTIVE | COMMUNITY
Start: 2023-05-09 | End: 1900-01-01

## 2023-01-01 RX ORDER — DENOSUMAB 60 MG/ML
60 INJECTION SUBCUTANEOUS
Refills: 0 | Status: DISCONTINUED | COMMUNITY
Start: 2022-08-26 | End: 2023-01-01

## 2023-01-01 RX ORDER — FAMOTIDINE 10 MG/ML
20 INJECTION INTRAVENOUS DAILY
Refills: 0 | Status: DISCONTINUED | OUTPATIENT
Start: 2023-01-01 | End: 2023-01-01

## 2023-01-01 RX ORDER — PROMETHAZINE HYDROCHLORIDE 25 MG/1
25 TABLET ORAL
Qty: 30 | Refills: 0 | Status: DISCONTINUED | COMMUNITY
Start: 2022-08-11 | End: 2023-01-01

## 2023-01-01 RX ORDER — POTASSIUM PHOSPHATE, MONOBASIC POTASSIUM PHOSPHATE, DIBASIC 236; 224 MG/ML; MG/ML
30 INJECTION, SOLUTION INTRAVENOUS ONCE
Refills: 0 | Status: COMPLETED | OUTPATIENT
Start: 2023-01-01 | End: 2023-01-01

## 2023-01-01 RX ORDER — SIMVASTATIN 20 MG/1
1 TABLET, FILM COATED ORAL
Qty: 0 | Refills: 0 | DISCHARGE

## 2023-01-01 RX ORDER — MIRTAZAPINE 45 MG/1
7.5 TABLET, ORALLY DISINTEGRATING ORAL AT BEDTIME
Refills: 0 | Status: DISCONTINUED | OUTPATIENT
Start: 2023-01-01 | End: 2023-01-01

## 2023-01-01 RX ORDER — APIXABAN 2.5 MG/1
2.5 TABLET, FILM COATED ORAL
Refills: 0 | Status: DISCONTINUED | OUTPATIENT
Start: 2023-01-01 | End: 2023-01-01

## 2023-01-01 RX ORDER — MULTIVIT-MIN/FOLIC/VIT K/LYCOP 400-300MCG
25 MCG TABLET ORAL
Refills: 0 | Status: DISCONTINUED | COMMUNITY
Start: 2022-08-26 | End: 2023-01-01

## 2023-01-01 RX ORDER — MIRTAZAPINE 45 MG/1
7.5 TABLET, ORALLY DISINTEGRATING ORAL DAILY
Refills: 0 | Status: DISCONTINUED | OUTPATIENT
Start: 2023-01-01 | End: 2023-01-01

## 2023-01-01 RX ORDER — ATORVASTATIN CALCIUM 80 MG/1
20 TABLET, FILM COATED ORAL AT BEDTIME
Refills: 0 | Status: DISCONTINUED | OUTPATIENT
Start: 2023-01-01 | End: 2023-01-01

## 2023-01-01 RX ORDER — CICLOPIROX OLAMINE 7.7 MG/G
0.77 CREAM TOPICAL
Qty: 90 | Refills: 0 | Status: DISCONTINUED | COMMUNITY
Start: 2022-07-11 | End: 2023-01-01

## 2023-01-01 RX ORDER — FOLIC ACID 0.8 MG
1 TABLET ORAL
Qty: 0 | Refills: 0 | DISCHARGE

## 2023-01-01 RX ORDER — SODIUM CHLORIDE 9 MG/ML
1000 INJECTION, SOLUTION INTRAVENOUS
Refills: 0 | Status: DISCONTINUED | OUTPATIENT
Start: 2023-01-01 | End: 2023-01-01

## 2023-01-01 RX ORDER — ACETAMINOPHEN 500 MG
650 TABLET ORAL EVERY 6 HOURS
Refills: 0 | Status: DISCONTINUED | OUTPATIENT
Start: 2023-01-01 | End: 2023-01-01

## 2023-01-01 RX ORDER — FOLIC ACID 1 MG/1
1 TABLET ORAL
Refills: 0 | Status: DISCONTINUED | COMMUNITY
Start: 2022-08-26 | End: 2023-01-01

## 2023-01-01 RX ORDER — OSIMERTINIB 80 1/1
1 TABLET, FILM COATED ORAL
Qty: 30 | Refills: 0
Start: 2023-01-01 | End: 2023-01-01

## 2023-01-01 RX ORDER — KETOROLAC TROMETHAMINE 5 MG/ML
0.5 SOLUTION OPHTHALMIC
Qty: 10 | Refills: 0 | Status: DISCONTINUED | COMMUNITY
Start: 2022-06-07 | End: 2023-01-01

## 2023-01-01 RX ORDER — PREDNISONE 2.5 MG/1
2.5 TABLET ORAL
Qty: 45 | Refills: 3 | Status: COMPLETED | COMMUNITY
Start: 2022-12-29 | End: 2023-01-01

## 2023-01-01 RX ORDER — OSIMERTINIB 80 1/1
80 TABLET, FILM COATED ORAL
Qty: 30 | Refills: 5 | Status: DISCONTINUED | COMMUNITY
Start: 2022-09-23 | End: 2023-01-01

## 2023-01-01 RX ORDER — LANOLIN ALCOHOL/MO/W.PET/CERES
3 CREAM (GRAM) TOPICAL AT BEDTIME
Refills: 0 | Status: DISCONTINUED | OUTPATIENT
Start: 2023-01-01 | End: 2023-01-01

## 2023-01-01 RX ORDER — FAMOTIDINE 40 MG/1
40 TABLET, FILM COATED ORAL
Qty: 30 | Refills: 0 | Status: DISCONTINUED | COMMUNITY
Start: 2022-06-29 | End: 2023-01-01

## 2023-01-01 RX ORDER — CETIRIZINE HYDROCHLORIDE 10 MG/1
10 TABLET, FILM COATED ORAL
Qty: 30 | Refills: 0 | Status: DISCONTINUED | COMMUNITY
Start: 2023-06-05 | End: 2023-01-01

## 2023-01-01 RX ORDER — PREDNISOLONE ACETATE 10 MG/ML
1 SUSPENSION/ DROPS OPHTHALMIC
Qty: 5 | Refills: 0 | Status: DISCONTINUED | COMMUNITY
Start: 2022-05-15 | End: 2023-01-01

## 2023-01-01 RX ORDER — HYDROXYCHLOROQUINE SULFATE 200 MG
200 TABLET ORAL DAILY
Refills: 0 | Status: DISCONTINUED | OUTPATIENT
Start: 2023-01-01 | End: 2023-01-01

## 2023-01-01 RX ORDER — SODIUM CHLORIDE 9 MG/ML
500 INJECTION, SOLUTION INTRAVENOUS
Refills: 0 | Status: DISCONTINUED | OUTPATIENT
Start: 2023-01-01 | End: 2023-01-01

## 2023-01-01 RX ORDER — TOBRAMYCIN AND DEXAMETHASONE 3; 1 MG/ML; MG/ML
0.3-0.1 SUSPENSION/ DROPS OPHTHALMIC
Qty: 5 | Refills: 0 | Status: DISCONTINUED | COMMUNITY
Start: 2022-05-13 | End: 2023-01-01

## 2023-01-01 RX ORDER — OSIMERTINIB 80 1/1
80 TABLET, FILM COATED ORAL
Refills: 0 | DISCHARGE

## 2023-01-01 RX ORDER — DENOSUMAB 120 MG/1.7ML
120 INJECTION SUBCUTANEOUS
Refills: 0 | Status: ACTIVE | COMMUNITY
Start: 2023-01-01

## 2023-01-01 RX ORDER — OFLOXACIN 3 MG/ML
0.3 SOLUTION/ DROPS OPHTHALMIC
Qty: 5 | Refills: 0 | Status: DISCONTINUED | COMMUNITY
Start: 2022-05-14 | End: 2023-01-01

## 2023-01-01 RX ORDER — AMLODIPINE BESYLATE 2.5 MG/1
1 TABLET ORAL
Qty: 0 | Refills: 0 | DISCHARGE

## 2023-01-01 RX ORDER — INFLUENZA VIRUS VACCINE 15; 15; 15; 15 UG/.5ML; UG/.5ML; UG/.5ML; UG/.5ML
0.7 SUSPENSION INTRAMUSCULAR ONCE
Refills: 0 | Status: DISCONTINUED | OUTPATIENT
Start: 2023-01-01 | End: 2023-01-01

## 2023-01-01 RX ORDER — PRAMOXINE HYDROCHLORIDE AND ZINC ACETATE 10; 1 MG/ML; MG/ML
1-0.1 LOTION TOPICAL
Qty: 1 | Refills: 0 | Status: COMPLETED | COMMUNITY
Start: 2023-03-16 | End: 2023-01-01

## 2023-01-01 RX ORDER — ETANERCEPT 25 MG
1 VIAL (EA) SUBCUTANEOUS
Qty: 0 | Refills: 0 | DISCHARGE

## 2023-01-01 RX ORDER — SODIUM CHLORIDE 9 MG/ML
1000 INJECTION INTRAMUSCULAR; INTRAVENOUS; SUBCUTANEOUS ONCE
Refills: 0 | Status: COMPLETED | OUTPATIENT
Start: 2023-01-01 | End: 2023-01-01

## 2023-01-01 RX ORDER — SODIUM BICARBONATE 1 MEQ/ML
1300 SYRINGE (ML) INTRAVENOUS ONCE
Refills: 0 | Status: COMPLETED | OUTPATIENT
Start: 2023-01-01 | End: 2023-01-01

## 2023-01-01 RX ORDER — MIRTAZAPINE 7.5 MG/1
7.5 TABLET, FILM COATED ORAL
Qty: 30 | Refills: 0 | Status: DISCONTINUED | COMMUNITY
Start: 2022-10-21 | End: 2023-01-01

## 2023-01-01 RX ADMIN — Medication 200 MILLIGRAM(S): at 11:03

## 2023-01-01 RX ADMIN — APIXABAN 2.5 MILLIGRAM(S): 2.5 TABLET, FILM COATED ORAL at 17:25

## 2023-01-01 RX ADMIN — SODIUM CHLORIDE 1000 MILLILITER(S): 9 INJECTION INTRAMUSCULAR; INTRAVENOUS; SUBCUTANEOUS at 14:59

## 2023-01-01 RX ADMIN — APIXABAN 2.5 MILLIGRAM(S): 2.5 TABLET, FILM COATED ORAL at 17:02

## 2023-01-01 RX ADMIN — Medication 3 MILLIGRAM(S): at 21:39

## 2023-01-01 RX ADMIN — FAMOTIDINE 20 MILLIGRAM(S): 10 INJECTION INTRAVENOUS at 11:39

## 2023-01-01 RX ADMIN — MIRTAZAPINE 7.5 MILLIGRAM(S): 45 TABLET, ORALLY DISINTEGRATING ORAL at 21:12

## 2023-01-01 RX ADMIN — APIXABAN 2.5 MILLIGRAM(S): 2.5 TABLET, FILM COATED ORAL at 17:04

## 2023-01-01 RX ADMIN — MIRTAZAPINE 7.5 MILLIGRAM(S): 45 TABLET, ORALLY DISINTEGRATING ORAL at 21:44

## 2023-01-01 RX ADMIN — FAMOTIDINE 20 MILLIGRAM(S): 10 INJECTION INTRAVENOUS at 11:19

## 2023-01-01 RX ADMIN — APIXABAN 2.5 MILLIGRAM(S): 2.5 TABLET, FILM COATED ORAL at 05:47

## 2023-01-01 RX ADMIN — Medication 200 MILLIGRAM(S): at 11:40

## 2023-01-01 RX ADMIN — SODIUM CHLORIDE 75 MILLILITER(S): 9 INJECTION, SOLUTION INTRAVENOUS at 20:05

## 2023-01-01 RX ADMIN — SODIUM CHLORIDE 100 MILLILITER(S): 9 INJECTION, SOLUTION INTRAVENOUS at 23:10

## 2023-01-01 RX ADMIN — FAMOTIDINE 20 MILLIGRAM(S): 10 INJECTION INTRAVENOUS at 17:04

## 2023-01-01 RX ADMIN — APIXABAN 2.5 MILLIGRAM(S): 2.5 TABLET, FILM COATED ORAL at 05:39

## 2023-01-01 RX ADMIN — Medication 63.75 MILLIMOLE(S): at 11:24

## 2023-01-01 RX ADMIN — APIXABAN 2.5 MILLIGRAM(S): 2.5 TABLET, FILM COATED ORAL at 17:09

## 2023-01-01 RX ADMIN — POTASSIUM PHOSPHATE, MONOBASIC POTASSIUM PHOSPHATE, DIBASIC 83.33 MILLIMOLE(S): 236; 224 INJECTION, SOLUTION INTRAVENOUS at 11:24

## 2023-01-01 RX ADMIN — Medication 1300 MILLIGRAM(S): at 23:10

## 2023-01-01 RX ADMIN — Medication 200 MILLIGRAM(S): at 17:04

## 2023-01-01 RX ADMIN — ATORVASTATIN CALCIUM 20 MILLIGRAM(S): 80 TABLET, FILM COATED ORAL at 21:12

## 2023-01-01 RX ADMIN — MIRTAZAPINE 7.5 MILLIGRAM(S): 45 TABLET, ORALLY DISINTEGRATING ORAL at 21:39

## 2023-01-01 RX ADMIN — FAMOTIDINE 20 MILLIGRAM(S): 10 INJECTION INTRAVENOUS at 11:03

## 2023-01-01 RX ADMIN — Medication 200 MILLIGRAM(S): at 11:19

## 2023-01-01 RX ADMIN — APIXABAN 2.5 MILLIGRAM(S): 2.5 TABLET, FILM COATED ORAL at 05:25

## 2023-01-01 RX ADMIN — ATORVASTATIN CALCIUM 20 MILLIGRAM(S): 80 TABLET, FILM COATED ORAL at 21:39

## 2023-01-01 RX ADMIN — ATORVASTATIN CALCIUM 20 MILLIGRAM(S): 80 TABLET, FILM COATED ORAL at 21:43

## 2023-01-01 NOTE — PHYSICAL EXAM
[Alert] : alert [No Acute Distress] : in no acute distress [No Respiratory Distress] : no respiratory distress [No Acc Muscle Use] : no accessory muscle use [Respiration, Rhythm And Depth] : normal respiratory rhythm and effort [Oriented To Time, Place, And Person] : oriented to person, place, and time [Normal Affect] : the affect was normal [Normal Insight/Judgment] : insight and judgment were intact [Normal Mood] : the mood was normal [de-identified] : left LE >right LE; chronic, no pain or erythema

## 2023-01-01 NOTE — HISTORY OF PRESENT ILLNESS
[No falls in past year] : Patient reported no falls in the past year [Completely Independent] : Completely independent. [Independent] : managing finances [] : Assistance needed with traveling/transport [0] : 2) Feeling down, depressed, or hopeless: Not at all (0) [PHQ-2 Negative - No further assessment needed] : PHQ-2 Negative - No further assessment needed [With Patient/Caregiver] : , with patient/caregiver [I will adhere to the patient's wishes.] : I will adhere to the patient's wishes. [FreeTextEntry1] : VIPIN KENDRICK is an 80 yo woman with stage IV NSCLC (on Tagrisso), rheumatoid arthritis, HTN, HLD, osteoporosis, hx of COVID-19 (5/2022) and hx of PE who presents for acute visit to evaluate bilateral hip pain and rash. Patient is accompanied by her  for visit. \par \par Hip pain:\par -started earlier this week\par -bilateral hip pain radiating down her legs to both feet\par -descibed as "burning"\par -No evidence of saddle anesthesia, no episodes of urinary or fecal incontinence, no changes in gait\par -Discomfort is relieved by Tylenol\par -patient has mets to right lung, left pleura (thoracic spine & R proximal femur); has repeat imaging ordered by Dr. Persaud but pending prior authorization\par -does endorse LE edema (left >right) but states that this is chronic from previous ankle surgery she had in the past; denies calf pain\par \par Otherwise, patient is doing ok. She has not had any falls, urgent care visits, or hospitalizations since last visit.\par \par \par \par  [de-identified] : 6 [de-identified] : 7 [[id number]] : 0 [AdvancecareDate] : 11/22 [FreeTextEntry4] : -HCP form completed; primary HCP is , Gustavo [phone] and secondary HCP is  son, Richardson [phone] \par -no MOLST in place; plan to open discussion at f/u visit

## 2023-01-01 NOTE — REVIEW OF SYSTEMS
[Feeling Tired] : feeling tired [Lower Ext Edema] : lower extremity edema [As Noted in HPI] : as noted in HPI [Discharge From Eyes] : no purulent discharge from the eyes [Dry Eyes] : no dryness of the eyes [Nasal Discharge] : no nasal discharge [Sore Throat] : no sore throat [Chest Pain] : no chest pain [Palpitations] : no palpitations [Shortness Of Breath] : no shortness of breath [Wheezing] : no wheezing [Cough] : no cough [SOB on Exertion] : no shortness of breath during exertion [Abdominal Pain] : no abdominal pain [Vomiting] : no vomiting [Constipation] : no constipation [Diarrhea] : no diarrhea [Dysuria] : no dysuria [Incontinence] : no incontinence [Limb Pain] : no limb pain [Skin Wound] : no skin wound [Dizziness] : no dizziness [Suicidal] : not suicidal [FreeTextEntry2] : p [de-identified] : rash, systemic pruritis

## 2023-01-01 NOTE — REASON FOR VISIT
[Follow-Up] : a follow-up visit [Family Member] : family member [FreeTextEntry1] : stage IV NSCLC [FreeTextEntry3] : spouse, Gustavo

## 2023-01-01 NOTE — HISTORY OF PRESENT ILLNESS
[Patient reported hearing was normal] : Patient reported hearing was normal [Patient reported vision is normal] : Patient reported vision is normal [Patient reported Patient reported dental screening is normal] : Patient reported dental screening is normal [No falls in past year] : Patient reported no falls in the past year [Completely Independent] : Completely independent. [] : Assistance needed with traveling/transport [Smoke Detector] : smoke detector [Carbon Monoxide Detector] : carbon monoxide detector [Night Light] : night light [0] : 1) Little interest or pleasure doing things: Not at all (0) [1] : 2) Feeling down, depressed, or hopeless for several days (1) [With Patient/Caregiver] : , with patient/caregiver [I will adhere to the patient's wishes.] : I will adhere to the patient's wishes. [PHQ-2 Negative - No further assessment needed] : PHQ-2 Negative - No further assessment needed [FreeTextEntry1] : VIPIN KENDRICK is an 80 yo woman with stage IV NSCLC (on Tagrisso), rheumatoid arthritis, HTN, HLD, osteoporosis, hx of COVID-19 (5/2022) and hx of PE who presents for initial visit to establish care at geriatric practice.. Patient is accompanied by her , Gustavo. \par \par Prior PCP:Dr. Quintanilla\par Oncologist: Dr. Persaud\par Rheumatologist: Dr. El \par Pulm: Dr. Dos Santos\par GI: Dr. Fox\par Palliative: Dr. Street\par \par Patient states that she has been doing okay since last visit Patient states that she was worsening LE edema and systemic pruritis. Has chronic fatigue since diagnosis/ initiation of Tagrisso. She has not had any falls, urgent care visits or hospitalizations. \par \par Bilateral hip pain:\par -seen for acute visit on 11/18/22 for bilateral hip pain radiating down her legs to both feet\par -since resolved\par \par LE edema:\par -left>right\par -chronic but has gotten worse over the last month or so \par -had b/l venous doppler on 12/19/2022 (ordered by Dr. Persaud) which was negative for DVT\par \par Acneiform rash/pruritis:\par -dermatologic side effect of Tagrisso \par -patient is moisturizing with Eucerin 2-3 times per day\par -previously ordered clindamycin cream per Dr. Persaud but patient has been unable to use because it burns upon application \par -using hydrocortisone cream for pruritis but states that pruritis is systemic and hydrocortisone does not provide relief\par -was started on prednisone 10mg qd by rheumatologist about 2 weeks ago which helped symptoms greatly; took for one week then stopped \par \par Stage IV NSCLC:\par -imaging from 12/8/22 showel significant improvement in disease burden in LAD; brain MRI showed no mets\par -thyroid nodule noted on baseline PET- Dr. Persaud notes that if persistent on next scan will consider workup \par -non-small cell lung adenocarcinoma of the LLL with mets to right lung, left pleura (thoracic spine & R proximal femur)\par -on osimertinib (Tagrisso)\par  -Sep 2022: Started osimertinib (Tagrisso)\par          Disease: lung cancer \par          Pathology: adeno ca \par          TNM stage: T4, N3, M1 \par          AJCC Stage: IV\par -follows with Dr. Street (Palliative) and  Dr. Persaud (Oncologist)\par \par Hx of PE:\par -found during hospitalization in September\par -on eliquis 5 mg bid ; patient in hypercoagulable states due to metastatic disease\par \par Nausea/diminished appetitie:\par -improved significantly since last visit\par -Since starting osimertinib\par -on famotidine and pantoprazole \par \par Sleep disturbance:\par -sleep has been a bit better \par -poor response to mirtazapine\par \par Depression:\par -no history but states that she has been very sad since learning  about her diagnosis\par -states repeatedly that she has been pretty healthy her whole life and that this has been horrible for her and her family\par -trialed mirtazapine for mood/sleep but had poor response\par \par Rheumatoid arthritis:\par -on hydroxychloroquine 200mg qd \par -previously on weekly Enbrel injections; stopped due to contraindication in the setting of immunocompromised patient\par -also had rash on left thigh in early October, through to be from injection\par -occasionally with right shoulder pain; states that she takes Tylenol occasionally which helps\par \par Osteoporosis:\par -started on Xgeva for bone mets (dental clearance obtained)\par -previously on  Prolia \par -also taking Vitamin D\par \par HLD:\par -on atorvastatin 20 mg \par \par HTN:\par -no on any meds; amlodipine discontinued at last visit\par \par ACP:\par -primary HCP is , Gustavo [phone] and secondary HCP is  sonRichardson [phone] \par -no MOLST in place;\par \par Immunizations: need records\par -COVID-19: x2; recommended booster\par -Flu: never received; recommended today especially d/t immunocompromised state but patient defers\par -Shingles:\par -pna:\par -tdap: [TextBox_25] : has OP- previously on Prolia, now on Xgeva [TextBox_43] : follows with dentist regularly [de-identified] : 6 [FreeTextEntry6] : patient has not driven for the last ~20 years [de-identified] : no DME [de-identified] : 7 [[id number]] : 1 [AdvancecareDate] : 12/22 [FreeTextEntry4] : -HCP form completed today; primary HCP is , Gustavo [phone] and secondary HCP is  son, Richardson [phone] \par -no MOLST in place; plan to open discussion at f/u visit

## 2023-01-01 NOTE — PHYSICAL EXAM
[Alert] : alert [Sclera] : the sclera and conjunctiva were normal [EOMI] : extraocular movements were intact [PERRL] : pupils were equal in size, round, and reactive to light [Normal Oral Mucosa] : normal oral mucosa [Normal Outer Ear/Nose] : the ears and nose were normal in appearance [Both Tympanic Membranes Were Examined] : both tympanic membranes were normal [Normal Appearance] : the appearance of the neck was normal [Supple] : the neck was supple [No Respiratory Distress] : no respiratory distress [No Acc Muscle Use] : no accessory muscle use [Respiration, Rhythm And Depth] : normal respiratory rhythm and effort [Auscultation Breath Sounds / Voice Sounds] : lungs were clear to auscultation bilaterally [Normal S1, S2] : normal S1 and S2 [Heart Rate And Rhythm] : heart rate was normal and rhythm regular [Edema] : edema was not present [Pedal Pulses Normal] : the pedal pulses are present [Bowel Sounds] : normal bowel sounds [Abdomen Tenderness] : non-tender [Abdomen Soft] : soft [Cervical Lymph Nodes Enlarged Posterior Bilaterally] : posterior cervical [Cervical Lymph Nodes Enlarged Anterior Bilaterally] : anterior cervical, supraclavicular [No CVA Tenderness] : no CVA  tenderness [No Spinal Tenderness] : no spinal tenderness [Normal Gait] : normal gait [Motor Tone] : muscle strength and tone were normal [No Focal Deficits] : no focal deficits [Oriented To Time, Place, And Person] : oriented to person, place, and time [Normal Affect] : the affect was normal [Normal Insight/Judgment] : insight and judgment were intact [Normal Mood] : the mood was normal [de-identified] : acneiform rash on sporadic on lower and upper extremities

## 2023-01-10 ENCOUNTER — APPOINTMENT (OUTPATIENT)
Dept: GERIATRICS | Facility: CLINIC | Age: 83
End: 2023-01-10
Payer: MEDICARE

## 2023-01-10 VITALS
HEIGHT: 55 IN | RESPIRATION RATE: 16 BRPM | WEIGHT: 108 LBS | BODY MASS INDEX: 24.99 KG/M2 | OXYGEN SATURATION: 99 % | DIASTOLIC BLOOD PRESSURE: 70 MMHG | HEART RATE: 84 BPM | SYSTOLIC BLOOD PRESSURE: 128 MMHG

## 2023-01-10 PROCEDURE — 99215 OFFICE O/P EST HI 40 MIN: CPT

## 2023-01-10 PROCEDURE — 99497 ADVNCD CARE PLAN 30 MIN: CPT

## 2023-01-11 NOTE — REASON FOR VISIT
[Follow-Up] : a follow-up visit [Family Member] : family member [FreeTextEntry1] : stage IV NSCLC, pruritis, LE edema [FreeTextEntry3] : spouse, Gustavo

## 2023-01-11 NOTE — HISTORY OF PRESENT ILLNESS
[Patient reported hearing was normal] : Patient reported hearing was normal [Patient reported vision is normal] : Patient reported vision is normal [Patient reported Patient reported dental screening is normal] : Patient reported dental screening is normal [No falls in past year] : Patient reported no falls in the past year [Completely Independent] : Completely independent. [] : Assistance needed with traveling/transport [Smoke Detector] : smoke detector [Carbon Monoxide Detector] : carbon monoxide detector [Night Light] : night light [PHQ-2 Negative - No further assessment needed] : PHQ-2 Negative - No further assessment needed [With Patient/Caregiver] : , with patient/caregiver [I will adhere to the patient's wishes.] : I will adhere to the patient's wishes. [0] : 2) Feeling down, depressed, or hopeless: Not at all (0) [Reviewed no changes] : Reviewed, no changes [Time Spent: ___ minutes] : Time Spent: [unfilled] minutes [FreeTextEntry1] : VIPIN KENDRICK is an 82 yo woman with stage IV NSCLC (on Tagrisso), rheumatoid arthritis, HTN, HLD, osteoporosis, hx of COVID-19 (5/2022) and hx of PE who presents for follow up of systemic pruritis and LE edema. Patient is accompanied by her , Gustavo. \par \par Prior PCP:Dr. Quintanilla\par Oncologist: Dr. Persaud\par Rheumatologist: Dr. El \par Pulm: Dr. Dos Santos\par GI: Dr. Fox\par Palliative: Dr. Street\par \par Patient states that she is doing much better today. She states that systemic pruritis has improved greatly with prednisone although she does still have pruritis on her back. She is continuing to moisturize as much as possible throughout the day as well which helps.\par \par Patient has been wearing compression socks since yesterday. States that despite this only being day 2 of wearing compression sock, LE edema has improved greatly and discomfort has improved as well. \par \par ACP:\par -primary HCP is , Gustavo [phone] and secondary HCP is  son, Richardson [phone] \par -no MOLST in place;\par  [TextBox_25] : has OP- previously on Prolia, now on Xgeva [TextBox_43] : follows with dentist regularly [de-identified] : 6 [FreeTextEntry6] : patient has not driven for the last ~20 years [de-identified] : 7 [de-identified] : no DME [[id number]] : 0 [AdvancecareDate] : 01/23 [FreeTextEntry4] : - primary HCP is , Gustavo [phone] and secondary HCP is  son, Richardson [phone] \par -discussed MOLST form at length today; when discussing CPR, patient states "I don't think I would want that"\par -patient and  wish to discuss MOLST with their son who is a doctor. Provided copy of MOLST to review at home. Plan to discuss again at next follow up.

## 2023-01-11 NOTE — PHYSICAL EXAM
[Alert] : alert [Sclera] : the sclera and conjunctiva were normal [EOMI] : extraocular movements were intact [PERRL] : pupils were equal in size, round, and reactive to light [Normal Oral Mucosa] : normal oral mucosa [Normal Outer Ear/Nose] : the ears and nose were normal in appearance [Both Tympanic Membranes Were Examined] : both tympanic membranes were normal [Normal Appearance] : the appearance of the neck was normal [Supple] : the neck was supple [No Respiratory Distress] : no respiratory distress [No Acc Muscle Use] : no accessory muscle use [Respiration, Rhythm And Depth] : normal respiratory rhythm and effort [Auscultation Breath Sounds / Voice Sounds] : lungs were clear to auscultation bilaterally [Normal S1, S2] : normal S1 and S2 [Heart Rate And Rhythm] : heart rate was normal and rhythm regular [Edema] : edema was not present [Pedal Pulses Normal] : the pedal pulses are present [Bowel Sounds] : normal bowel sounds [Abdomen Tenderness] : non-tender [Abdomen Soft] : soft [Cervical Lymph Nodes Enlarged Posterior Bilaterally] : posterior cervical [Cervical Lymph Nodes Enlarged Anterior Bilaterally] : anterior cervical, supraclavicular [No CVA Tenderness] : no CVA  tenderness [No Spinal Tenderness] : no spinal tenderness [Normal Gait] : normal gait [Motor Tone] : muscle strength and tone were normal [No Focal Deficits] : no focal deficits [Oriented To Time, Place, And Person] : oriented to person, place, and time [Normal Affect] : the affect was normal [Normal Insight/Judgment] : insight and judgment were intact [Normal Mood] : the mood was normal [de-identified] : acneiform rash sporadic on lower and upper extremities, dry skin especially on her back, skin where rash is present is tender, no exudate or evidence of infection

## 2023-01-11 NOTE — REVIEW OF SYSTEMS
[Lower Ext Edema] : lower extremity edema [As Noted in HPI] : as noted in HPI [Feeling Poorly] : not feeling poorly [Feeling Tired] : not feeling tired [Discharge From Eyes] : no purulent discharge from the eyes [Dry Eyes] : no dryness of the eyes [Nasal Discharge] : no nasal discharge [Sore Throat] : no sore throat [Chest Pain] : no chest pain [Palpitations] : no palpitations [Shortness Of Breath] : no shortness of breath [Wheezing] : no wheezing [Cough] : no cough [SOB on Exertion] : no shortness of breath during exertion [Abdominal Pain] : no abdominal pain [Vomiting] : no vomiting [Constipation] : no constipation [Diarrhea] : no diarrhea [Dysuria] : no dysuria [Incontinence] : no incontinence [Limb Pain] : no limb pain [Skin Wound] : no skin wound [Dizziness] : no dizziness [Suicidal] : not suicidal [de-identified] : rash, pruritis

## 2023-01-24 ENCOUNTER — NON-APPOINTMENT (OUTPATIENT)
Age: 83
End: 2023-01-24

## 2023-01-24 ENCOUNTER — APPOINTMENT (OUTPATIENT)
Dept: GERIATRICS | Facility: CLINIC | Age: 83
End: 2023-01-24
Payer: MEDICARE

## 2023-01-24 VITALS
RESPIRATION RATE: 15 BRPM | WEIGHT: 108 LBS | OXYGEN SATURATION: 99 % | HEIGHT: 60 IN | BODY MASS INDEX: 21.2 KG/M2 | DIASTOLIC BLOOD PRESSURE: 77 MMHG | TEMPERATURE: 98 F | HEART RATE: 98 BPM | SYSTOLIC BLOOD PRESSURE: 132 MMHG

## 2023-01-24 DIAGNOSIS — Z01.818 ENCOUNTER FOR OTHER PREPROCEDURAL EXAMINATION: ICD-10-CM

## 2023-01-24 PROCEDURE — 99215 OFFICE O/P EST HI 40 MIN: CPT

## 2023-01-25 ENCOUNTER — APPOINTMENT (OUTPATIENT)
Dept: INFUSION THERAPY | Facility: HOSPITAL | Age: 83
End: 2023-01-25

## 2023-01-25 ENCOUNTER — APPOINTMENT (OUTPATIENT)
Dept: HEMATOLOGY ONCOLOGY | Facility: CLINIC | Age: 83
End: 2023-01-25
Payer: MEDICARE

## 2023-01-25 VITALS
HEART RATE: 78 BPM | BODY MASS INDEX: 21.25 KG/M2 | TEMPERATURE: 97.8 F | RESPIRATION RATE: 16 BRPM | SYSTOLIC BLOOD PRESSURE: 127 MMHG | HEIGHT: 60 IN | WEIGHT: 108.25 LBS | DIASTOLIC BLOOD PRESSURE: 76 MMHG | OXYGEN SATURATION: 99 %

## 2023-01-25 PROCEDURE — 99215 OFFICE O/P EST HI 40 MIN: CPT

## 2023-01-25 NOTE — REVIEW OF SYSTEMS
[Fatigue] : fatigue [Recent Change In Weight] : ~T recent weight change [Lower Ext Edema] : lower extremity edema [Skin Rash] : skin rash [Dizziness] : dizziness [Anxiety] : anxiety [Depression] : depression [Negative] : Allergic/Immunologic [Cough] : no cough [SOB on Exertion] : no shortness of breath during exertion [FreeTextEntry7] : GERD-like symptoms resolved [de-identified] : almost resolved [de-identified] : improved [de-identified] : affect better

## 2023-01-25 NOTE — CONSULT LETTER
[Dear  ___] : Dear  [unfilled], [Courtesy Letter:] : I had the pleasure of seeing your patient, [unfilled], in my office today. [Please see my note below.] : Please see my note below. [Sincerely,] : Sincerely, [FreeTextEntry2] : Dr. Paul Flores\par Phone#[phone] [FreeTextEntry3] : Gray Persaud MD\par \par

## 2023-01-25 NOTE — ASSESSMENT
[FreeTextEntry1] : 81 yo w with some comorbidities, including RA, with stage IV diagnosed lung adeno ca\par Baseline PET/CT showed FDG avid mass in the left lower lung likely corresponding to the newly diagnosed adenocarcinoma. FDG avid nodule in the lingula, satellite lesion versus metastasis. FDG avid nodular opacities in the right middle and right lower lobes; differential includes infectious and metastatic disease. FDG avid pleural thickening in the left lower lung, suspicious for metastasis. Multiple FDG avid mediastinal, and bilateral hilar lymph nodes compatible with metastases. FDG avid bilateral supraclavicular lymph nodes suspicious for additional metastatic lesions. The left supraclavicular lymph node is amenable to ultrasound-guided FNA biopsy as indicated.  Scattered mildly FDG avid osseous foci in the axial skeleton and right proximal femur, concerning for metastases.  FDG avid low-attenuation lesion within the enlarged left thyroid lobe. Differential includes benign and malignant etiologies. This may be further evaluated with ultrasound and possible FNA biopsy. Nonspecific diffuse gastric hypermetabolism without CT correlate. Please correlate clinically for gastritis. \par Brain MRI showed no mets\par PDL1 40% and NGS showed multiple muts, importantly, EGFR exon 18 and exon 20 muts \par We discussed at depth dx of lung adeno ca, tumor genetics and palliative intent of tx\par Pt started on osimertinib 80 mg daily on 9/23\par She is tolerating this well and most of her symptoms have resolved\par I reviewed recent scans from December 2022 and note significant improvement in disease burden in LAD. Healed bony mets were noted. \par Brain MRI showed no mets\par Will get new PET/CT \par Discussed administration, potential AEs, importance of close monitoring with labs, toxicity and efficacy assessment, and EKG while on this high risk medication\par Xgeva for bone mets (dental clearance obtained) to start ASAP. Pt has been taking Prolia for osteoporosis. \par Labs from yesterday done in PCP office reviewed- mild stable anemia. \par LE swelling likely sec to multifactorial- including perhaps prednisone. THis is being managed by PCP. \par OV in 6 weeks. \par \par \par \par

## 2023-01-25 NOTE — PHYSICAL EXAM
[Restricted in physically strenuous activity but ambulatory and able to carry out work of a light or sedentary nature] : Status 1- Restricted in physically strenuous activity but ambulatory and able to carry out work of a light or sedentary nature, e.g., light house work, office work [Thin] : thin [Normal] : affect appropriate [de-identified] : acneiform rash on skin, and mild LE edema

## 2023-01-25 NOTE — HISTORY OF PRESENT ILLNESS
[Disease: _____________________] : Disease: [unfilled] [T: ___] : T[unfilled] [N: ___] : N[unfilled] [M: ___] : M[unfilled] [AJCC Stage: ____] : AJCC Stage: [unfilled] [de-identified] : 82 year-old female with med hx of RA (Enbrel and hydroxycholorquine), osteoporosis (on prolia), dyslipidemia, started expereincing GI issues since COVID infection in May 2022. Went to PCP who referred her to ER for rt UQ pain- with concern of GB issues. CT scan at that time showed suspicious findings in the lung. Additional work up as below: \par \par CT chest A/P: LLL mass with mets to right lung, left pleura, bone (thoracic spine & R proximal femur), malignant left pleural effusion s/p thoracentesis (9/1/22), PE was also noted. She had recent EBUS/TBNA and thoracentesis on 9/1/22. Pathology with non-small cell lung adenocarcinoma, stage IV with malignant pleural effusion, lung primary\par \par PET/CT (Sept 2022) with FDG avid mass in the left lower lung likely corresponding to the newly diagnosed adenocarcinoma. FDG avid nodule in the lingula, satellite lesion versus metastasis. FDG avid nodular opacities in the right middle and right lower lobes; differential includes infectious and metastatic disease. FDG avid pleural thickening in the left lower lung, suspicious for metastasis. Multiple FDG avid mediastinal, and bilateral hilar lymph nodes compatible with metastases. FDG avid bilateral supraclavicular lymph nodes suspicious for additional metastatic lesions. The left supraclavicular lymph node is amenable to ultrasound-guided FNA biopsy as indicated. Scattered mildly FDG avid osseous foci in the axial skeleton and right proximal femur, concerning for metastases. FDG avid low-attenuation lesion within the enlarged left thyroid lobe. Differential includes benign and malignant etiologies. This may be further evaluated with ultrasound and possible FNA biopsy. Nonspecific diffuse gastric hypermetabolism without CT correlate. Please correlate clinically for gastritis. \par \par In terms of her symptoms, both patient and  endorsed that her symptoms of dyspnea did improve after the thoracentesis. Currently she feels well. She does have an decreased appetite and ongoing GI issues which have improved with PPI. \par \par \par 11/11/22: Pt here for follow up. She has no cough but she does have increased nausea and dizziness. She saw Dr. Street for symtom management. Dizziness mainly precedes dizziness preceding her bowel movements. Has some pain in her lower abdomen. Has 2-3 BMs per day, soft in consistency. She also notes increased bloating and fatigue. Appetite seems to be a little bettr and she has gained some weight. She admits to feeling depressed most of the time but she is not suicidal. \par \par 12/16/22: No issues today, Patient reports she feels better than her prior visit. She states she does not feel short of breath on exertion. No fevers, chills or cough. Patient reports some nausea with her osimertinib, otherwise no vomiting or diarrhea. She has persistent fatigue mid day. Rash on LE and mild ankle swelling\par \par 1/25/23: Overall improved with regards to all symptoms. No AEs from osi\par \par \par  [de-identified] : adeno ca

## 2023-01-27 PROBLEM — Z01.818 PREOP TESTING: Status: ACTIVE | Noted: 2023-01-24

## 2023-01-27 NOTE — HISTORY OF PRESENT ILLNESS
[Preoperative Visit] : for a medical evaluation prior to surgery [Scheduled Procedure ___] : a [unfilled] [Date of Surgery ___] : on [unfilled] [Surgeon Name ___] : surgeon: [unfilled] [Stable] : Stable [Fatigue] : fatigue [Lower Extremity Swelling] : lower extremity swelling [Poor Exercise Tolerance] : poor exercise tolerance [Pulmonary Disease] : pulmonary disease [Thromboembolic Problems] : thromboembolic problems [Impaired Immunity] : impaired immunity [Steroid Use in Last 6 Months] : steroid use in the last six months [Prior Anesthesia] : Prior anesthesia [With Patient/Caregiver] : , with patient/caregiver [Reviewed no changes] : Reviewed, no changes [I will adhere to the patient's wishes.] : I will adhere to the patient's wishes. [Time Spent: ___ minutes] : Time Spent: [unfilled] minutes [Patient reported hearing was normal] : Patient reported hearing was normal [Patient reported vision is normal] : Patient reported vision is normal [Patient reported Patient reported dental screening is normal] : Patient reported dental screening is normal [No falls in past year] : Patient reported no falls in the past year [Completely Independent] : Completely independent. [] : Assistance needed with traveling/transport [Smoke Detector] : smoke detector [Carbon Monoxide Detector] : carbon monoxide detector [Night Light] : night light [0] : 1) Little interest or pleasure doing things: Not at all (0) [1] : 2) Feeling down, depressed, or hopeless for several days (1) [PHQ-2 Negative - No further assessment needed] : PHQ-2 Negative - No further assessment needed [Fever] : no fever [Chills] : no chills [Chest Pain] : no chest pain [Cough] : no cough [Dyspnea] : no dyspnea [Dysuria] : no dysuria [Urinary Frequency] : no urinary frequency [Nausea] : no nausea [Vomiting] : no vomiting [Diarrhea] : no diarrhea [Abdominal Pain] : no abdominal pain [Easy Bruising] : no easy bruising [Diabetes] : no diabetes [Cardiovascular Disease] : no cardiovascular disease [Anti-Platelet Agents] : no anti-platelet agents [Nicotine Dependence] : no nicotine dependence [Alcohol Use] : no  alcohol use [Renal Disease] : no renal disease [GI Disease] : no gastrointestinal disease [Sleep Apnea] : no sleep apnea [Clotting Disorder] : no clotting disorder [Frequent use of NSAIDs] : no use of NSAIDs [Bleeding Disorder] : no bleeding disorder [Transfusion Reaction] : no transfusion reaction [Frequent Aspirin Use] : no frequent aspirin use [Prev Anesthesia Reaction] : no previous anesthesia reaction [Anesthesia Reaction] : no anesthesia reaction [FreeTextEntry1] : VIPIN KENDRICK is an 82 yo woman with stage IV NSCLC (on Tagrisso), rheumatoid arthritis, HTN, HLD, osteoporosis, hx of COVID-19 (5/2022) and hx of PE who presents for preop clearance prior to cataract extraction on 2/9/23. Patient is accompanied by her , Gustavo. \par \par Prior PCP:Dr. Quintanilla\par Oncologist: Dr. Persaud\par Rheumatologist: Dr. El \par Pulm: Dr. Dos Santos\par GI: Dr. Fox\par Palliative: Dr. Street\par \par Patient was recently seen for follow up visit in practice on 1/10/23. There have been no changes in clinical condition since then. She has not had any falls, urgent care visits, or hospitalizations since last visit. States that she is feeling "much better". Does not have any medical complaints/acute concerns today. \par \par Bilateral hip pain:\par -seen for acute visit on 11/18/22 for bilateral hip pain radiating down her legs to both feet\par -since resolved\par \par LE edema:\par -left>right\par -had b/l venous doppler on 12/19/2022 (ordered by Dr. Persaud) which was negative for DVT\par -has improved significantly since using compression socks daily \par \par Acneiform rash/pruritis:\par -improved significantly; patient is now on prednisone 5mg qd \par -dermatologic side effect of Tagrisso \par -patient is moisturizing with moisturizer 2-3 times per day\par -previously ordered clindamycin cream per Dr. Persaud but patient has been unable to use because it burns upon application \par \par Stage IV NSCLC:\par -imaging from 12/8/22 showed significant improvement in disease burden in LAD; brain MRI showed no mets\par -thyroid nodule noted on baseline PET- Dr. Persaud notes that if persistent on next scan will consider workup \par -non-small cell lung adenocarcinoma of the LLL with mets to right lung, left pleura (thoracic spine & R proximal femur)\par -on osimertinib (Tagrisso)\par  -Sep 2022: Started osimertinib (Tagrisso)\par          Disease: lung cancer \par          Pathology: adeno ca \par          TNM stage: T4, N3, M1 \par          AJCC Stage: IV\par -follows with Dr. Street (Palliative) and  Dr. Persaud (Oncologist)\par \par Hx of PE:\par -found during hospitalization in September\par -on eliquis 5 mg bid ; patient in hypercoagulable states due to metastatic disease\par \par Nausea/diminished appetitie:\par -improved significantly since last visit\par -Since starting osimertinib\par -on famotidine and pantoprazole \par \par Sleep disturbance:\par -sleeping is no longer a problem\par -poor response to mirtazapine\par \par Depression:\par -mood has improved since multiple symptoms have improved however does mention being sad since learning  about her diagnosis\par -states repeatedly that she has been pretty healthy her whole life and that this has been horrible for her and her family\par -trialed mirtazapine for mood/sleep but had poor response\par \par Rheumatoid arthritis:\par -on hydroxychloroquine 200mg qd \par -previously on weekly Enbrel injections; stopped due to contraindication in the setting of immunocompromised patient\par -occasionally with right shoulder pain; states that she takes Tylenol occasionally which helps\par \par Osteoporosis:\par -started on Xgeva for bone mets (dental clearance obtained)\par -previously on  Prolia \par -also taking Vitamin D\par \par HLD:\par -on atorvastatin 20 mg \par \par HTN:\par -no on any meds; amlodipine discontinued at last visit\par \par ACP:\par -primary HCP is , Gustavo [phone] and secondary HCP is  son, Richardson [phone] \par -no MOLST in place; previously discussed with patient and her ; wanted to discuss with their son who is a doctor- plan to discuss at follow up \par \par Immunizations: need records\par -COVID-19: x2; recommended booster\par -Flu: never received; recommended today especially d/t immunocompromised state but patient defers\par -Shingles:\par -pna:\par -tdap: [AdvancecareDate] : 01/23 [FreeTextEntry4] : - primary HCP is , Gustavo [phone] and secondary HCP is  son, Richardson [phone] \par -discussed MOLST form at length; when discussing CPR, patient states "I don't think I would want that"\par -patient and  wish to discuss MOLST with their son who is a doctor. Provided copy of MOLST to review at home. Plan to discuss again at next follow up. [TextBox_25] : has OP- previously on Prolia, now on Xgeva [TextBox_43] : follows with dentist regularly [de-identified] : 6 [FreeTextEntry6] : patient has not driven for the last ~20 years [de-identified] : 7 [de-identified] : no DME [[id number]] : 1

## 2023-01-27 NOTE — PHYSICAL EXAM
[Alert] : alert [Sclera] : the sclera and conjunctiva were normal [EOMI] : extraocular movements were intact [PERRL] : pupils were equal in size, round, and reactive to light [Normal Oral Mucosa] : normal oral mucosa [Both Tympanic Membranes Were Examined] : both tympanic membranes were normal [Normal Outer Ear/Nose] : the ears and nose were normal in appearance [Normal Appearance] : the appearance of the neck was normal [Supple] : the neck was supple [No Respiratory Distress] : no respiratory distress [No Acc Muscle Use] : no accessory muscle use [Respiration, Rhythm And Depth] : normal respiratory rhythm and effort [Auscultation Breath Sounds / Voice Sounds] : lungs were clear to auscultation bilaterally [Normal S1, S2] : normal S1 and S2 [Heart Rate And Rhythm] : heart rate was normal and rhythm regular [Pedal Pulses Normal] : the pedal pulses are present [Bowel Sounds] : normal bowel sounds [Abdomen Tenderness] : non-tender [Abdomen Soft] : soft [Cervical Lymph Nodes Enlarged Anterior Bilaterally] : anterior cervical, supraclavicular [Cervical Lymph Nodes Enlarged Posterior Bilaterally] : posterior cervical [No CVA Tenderness] : no CVA  tenderness [No Spinal Tenderness] : no spinal tenderness [Normal Gait] : normal gait [Motor Tone] : muscle strength and tone were normal [No Focal Deficits] : no focal deficits [Oriented To Time, Place, And Person] : oriented to person, place, and time [Normal Affect] : the affect was normal [Normal Insight/Judgment] : insight and judgment were intact [Normal Mood] : the mood was normal [de-identified] : acneiform rash on sporadic on lower and upper extremities- has improved significantly [de-identified] : b/l LE edema, L>R, has improved greatly with compression socks

## 2023-01-27 NOTE — ASSESSMENT
[Procedure Low Risk] : the procedure risk is low [Optimized for Surgery] : the patient is optimized for surgery [As per surgery] : as per surgery [Continue] : Continue medications as currently directed [Patient High Risk] : the patient is a high surgical risk [FreeTextEntry1] : follow up in April, available earlier PRN \par \par Tiny Hunt, FNP-BC

## 2023-01-27 NOTE — REVIEW OF SYSTEMS
[Feeling Tired] : feeling tired [Lower Ext Edema] : lower extremity edema [As Noted in HPI] : as noted in HPI [Discharge From Eyes] : no purulent discharge from the eyes [Dry Eyes] : no dryness of the eyes [Nasal Discharge] : no nasal discharge [Sore Throat] : no sore throat [Chest Pain] : no chest pain [Palpitations] : no palpitations [Shortness Of Breath] : no shortness of breath [Wheezing] : no wheezing [Cough] : no cough [SOB on Exertion] : no shortness of breath during exertion [Abdominal Pain] : no abdominal pain [Vomiting] : no vomiting [Constipation] : no constipation [Diarrhea] : no diarrhea [Dysuria] : no dysuria [Incontinence] : no incontinence [Limb Pain] : no limb pain [Skin Wound] : no skin wound [Dizziness] : no dizziness [Suicidal] : not suicidal [de-identified] : rash, systemic pruritis

## 2023-01-31 LAB
ALBUMIN SERPL ELPH-MCNC: 4.1 G/DL
ALP BLD-CCNC: 58 U/L
ALT SERPL-CCNC: 30 U/L
ANION GAP SERPL CALC-SCNC: 11 MMOL/L
AST SERPL-CCNC: 20 U/L
BASOPHILS # BLD AUTO: 0.02 K/UL
BASOPHILS NFR BLD AUTO: 0.5 %
BILIRUB SERPL-MCNC: 0.2 MG/DL
BUN SERPL-MCNC: 30 MG/DL
CALCIUM SERPL-MCNC: 9.6 MG/DL
CHLORIDE SERPL-SCNC: 105 MMOL/L
CO2 SERPL-SCNC: 24 MMOL/L
CREAT SERPL-MCNC: 1.15 MG/DL
EGFR: 48 ML/MIN/1.73M2
EOSINOPHIL # BLD AUTO: 0.05 K/UL
EOSINOPHIL NFR BLD AUTO: 1.2 %
GLUCOSE SERPL-MCNC: 100 MG/DL
HCT VFR BLD CALC: 32 %
HGB BLD-MCNC: 9.8 G/DL
IMM GRANULOCYTES NFR BLD AUTO: 0.2 %
INR PPP: 1.18 RATIO
LYMPHOCYTES # BLD AUTO: 0.53 K/UL
LYMPHOCYTES NFR BLD AUTO: 12.6 %
MAN DIFF?: NORMAL
MCHC RBC-ENTMCNC: 28.7 PG
MCHC RBC-ENTMCNC: 30.6 GM/DL
MCV RBC AUTO: 93.8 FL
MONOCYTES # BLD AUTO: 0.27 K/UL
MONOCYTES NFR BLD AUTO: 6.4 %
NEUTROPHILS # BLD AUTO: 3.32 K/UL
NEUTROPHILS NFR BLD AUTO: 79.1 %
PLATELET # BLD AUTO: 188 K/UL
POTASSIUM SERPL-SCNC: 4.8 MMOL/L
PROT SERPL-MCNC: 7.1 G/DL
PT BLD: 14 SEC
RBC # BLD: 3.41 M/UL
RBC # FLD: 14.4 %
SODIUM SERPL-SCNC: 141 MMOL/L
WBC # FLD AUTO: 4.2 K/UL

## 2023-02-06 ENCOUNTER — APPOINTMENT (OUTPATIENT)
Dept: PULMONOLOGY | Facility: CLINIC | Age: 83
End: 2023-02-06
Payer: MEDICARE

## 2023-02-06 VITALS
DIASTOLIC BLOOD PRESSURE: 76 MMHG | RESPIRATION RATE: 17 BRPM | BODY MASS INDEX: 21.2 KG/M2 | HEIGHT: 60 IN | OXYGEN SATURATION: 99 % | WEIGHT: 108 LBS | SYSTOLIC BLOOD PRESSURE: 132 MMHG | TEMPERATURE: 97.5 F | HEART RATE: 83 BPM

## 2023-02-06 PROCEDURE — 99213 OFFICE O/P EST LOW 20 MIN: CPT

## 2023-02-12 ENCOUNTER — OUTPATIENT (OUTPATIENT)
Dept: OUTPATIENT SERVICES | Facility: HOSPITAL | Age: 83
LOS: 1 days | Discharge: HOME | End: 2023-02-12

## 2023-02-12 DIAGNOSIS — C34.90 MALIGNANT NEOPLASM OF UNSPECIFIED PART OF UNSPECIFIED BRONCHUS OR LUNG: ICD-10-CM

## 2023-02-12 DIAGNOSIS — Z90.89 ACQUIRED ABSENCE OF OTHER ORGANS: Chronic | ICD-10-CM

## 2023-02-12 DIAGNOSIS — Z98.49 CATARACT EXTRACTION STATUS, UNSPECIFIED EYE: Chronic | ICD-10-CM

## 2023-02-13 ENCOUNTER — APPOINTMENT (OUTPATIENT)
Dept: NUCLEAR MEDICINE | Facility: CLINIC | Age: 83
End: 2023-02-13
Payer: MEDICARE

## 2023-02-13 ENCOUNTER — OUTPATIENT (OUTPATIENT)
Dept: OUTPATIENT SERVICES | Facility: HOSPITAL | Age: 83
LOS: 1 days | End: 2023-02-13
Payer: MEDICARE

## 2023-02-13 DIAGNOSIS — Z90.89 ACQUIRED ABSENCE OF OTHER ORGANS: Chronic | ICD-10-CM

## 2023-02-13 DIAGNOSIS — Z00.8 ENCOUNTER FOR OTHER GENERAL EXAMINATION: ICD-10-CM

## 2023-02-13 DIAGNOSIS — Z98.49 CATARACT EXTRACTION STATUS, UNSPECIFIED EYE: Chronic | ICD-10-CM

## 2023-02-13 PROCEDURE — 78815 PET IMAGE W/CT SKULL-THIGH: CPT | Mod: 26,PS

## 2023-02-13 PROCEDURE — 78815 PET IMAGE W/CT SKULL-THIGH: CPT

## 2023-02-13 PROCEDURE — A9552: CPT

## 2023-02-13 NOTE — PHYSICAL EXAM
[No Acute Distress] : no acute distress [Normal Rate/Rhythm] : normal rate/rhythm [Normal S1, S2] : normal s1, s2 [No Resp Distress] : no resp distress [No Focal Deficits] : no focal deficits [Oriented x3] : oriented x3 [TextBox_68] : Diminished breath sounds at the left base

## 2023-02-13 NOTE — HISTORY OF PRESENT ILLNESS
[TextBox_4] : Interventional Pulmonology Consultation/Visit Note\par \par 83YO Female PMH history of Stage IV Non-small cell lung adenocarcinoma of the LLL with mets to right lung, left pleura, bone (thoracic spine & R proximal femur) On tagrisso, malignant left pleural effusion s/p thoracentesis (9/1/22), PE due to malignancy on apixaban, RA on Enbrel and hydroxychloroquine, HTN, HLD, osteoporosis, s/p COVID (in May 2022). She had recent EBUS/TBNA and thoracentesis on 9/1/22. Pathology with non-small cell lung adenocarcinoma, Stage IV with malignant pleural effusion.\par \par Last seen 11/2022 with moderate left side pleural effusion. She continues to take Tagrisso. She states her breathing is at baseline and has not worsened since her last visit. \par

## 2023-02-13 NOTE — REVIEW OF SYSTEMS
[Cough] : cough [Fever] : no fever [Sputum] : no sputum [Dyspnea] : no dyspnea [Chest Discomfort] : no chest discomfort

## 2023-02-13 NOTE — ASSESSMENT
[FreeTextEntry1] : 81YO Female PMH history of Stage IV Non-small cell lung adenocarcinoma of the LLL with mets to right lung, left pleura, bone (thoracic spine & R proximal femur) On tagrisso, malignant left pleural effusion s/p thoracentesis (9/1/22), PE due to malignancy on apixaban, RA on Enbrel and hydroxychloroquine, HTN, HLD, osteoporosis, s/p COVID (in May 2022). She had recent EBUS/TBNA and thoracentesis on 9/1/22. Pathology with non-small cell lung adenocarcinoma, Stage IV with malignant pleural effusion.\par \par #Left sided malignant effusion\par - Pt continues to denies SOB and reports her breathing is at her baseline\par - Small to moderate Left sided simple appearing pleural effusion on POCUS does not appear to be increasing in size\par - Will continue to monitor her for worsening symptoms \par - Advised to call sooner if any worsening symptoms noted\par - If worsening symptoms noted, we will plan for therapeutic pleural intervention\par - Thoracentesis vs TIPC based on speed and rate of recurrence. \par - We discussed the risks and benefits of both interventions as well as all alternative interventions. \par \par \par Case discussed with Dr. Dos Santos

## 2023-02-13 NOTE — PROCEDURE
[FreeTextEntry1] : Thoracic/Chest Ultrasound\par Indication: Pleural Effusion\par :  Levon Wynn\par \par Findings: \par Small to moderate Left sided simple appearing pleural effusion\par No right sided effusion\par \par Interpretation:\par Small to moderate Left sided simple appearing pleural effusion\par \par Images uploaded to [employer].

## 2023-02-22 ENCOUNTER — RESULT REVIEW (OUTPATIENT)
Age: 83
End: 2023-02-22

## 2023-02-22 ENCOUNTER — APPOINTMENT (OUTPATIENT)
Dept: HEMATOLOGY ONCOLOGY | Facility: CLINIC | Age: 83
End: 2023-02-22
Payer: MEDICARE

## 2023-02-22 ENCOUNTER — APPOINTMENT (OUTPATIENT)
Dept: INFUSION THERAPY | Facility: HOSPITAL | Age: 83
End: 2023-02-22

## 2023-02-22 VITALS
WEIGHT: 103.15 LBS | OXYGEN SATURATION: 98 % | DIASTOLIC BLOOD PRESSURE: 80 MMHG | HEART RATE: 79 BPM | TEMPERATURE: 97.3 F | HEIGHT: 60 IN | SYSTOLIC BLOOD PRESSURE: 170 MMHG | BODY MASS INDEX: 20.25 KG/M2 | RESPIRATION RATE: 17 BRPM

## 2023-02-22 VITALS — SYSTOLIC BLOOD PRESSURE: 153 MMHG | DIASTOLIC BLOOD PRESSURE: 80 MMHG

## 2023-02-22 DIAGNOSIS — C79.51 SECONDARY MALIGNANT NEOPLASM OF BONE: ICD-10-CM

## 2023-02-22 LAB
ALBUMIN SERPL ELPH-MCNC: 4 G/DL
ALP BLD-CCNC: 52 U/L
ALT SERPL-CCNC: 20 U/L
ANION GAP SERPL CALC-SCNC: 11 MMOL/L
AST SERPL-CCNC: 18 U/L
BASOPHILS # BLD AUTO: 0.04 K/UL — SIGNIFICANT CHANGE UP (ref 0–0.2)
BASOPHILS NFR BLD AUTO: 0.9 % — SIGNIFICANT CHANGE UP (ref 0–2)
BILIRUB SERPL-MCNC: 0.2 MG/DL
BUN SERPL-MCNC: 32 MG/DL
CALCIUM SERPL-MCNC: 9.4 MG/DL
CEA SERPL-MCNC: 39.4 NG/ML
CHLORIDE SERPL-SCNC: 106 MMOL/L
CO2 SERPL-SCNC: 25 MMOL/L
CREAT SERPL-MCNC: 1.2 MG/DL
EGFR: 45 ML/MIN/1.73M2
EOSINOPHIL # BLD AUTO: 0.06 K/UL — SIGNIFICANT CHANGE UP (ref 0–0.5)
EOSINOPHIL NFR BLD AUTO: 1.4 % — SIGNIFICANT CHANGE UP (ref 0–6)
GLUCOSE SERPL-MCNC: 78 MG/DL
HCT VFR BLD CALC: 31.6 % — LOW (ref 34.5–45)
HGB BLD-MCNC: 9.6 G/DL — LOW (ref 11.5–15.5)
IMM GRANULOCYTES NFR BLD AUTO: 0.5 % — SIGNIFICANT CHANGE UP (ref 0–0.9)
LYMPHOCYTES # BLD AUTO: 1.16 K/UL — SIGNIFICANT CHANGE UP (ref 1–3.3)
LYMPHOCYTES # BLD AUTO: 26.4 % — SIGNIFICANT CHANGE UP (ref 13–44)
MCHC RBC-ENTMCNC: 28.8 PG — SIGNIFICANT CHANGE UP (ref 27–34)
MCHC RBC-ENTMCNC: 30.4 G/DL — LOW (ref 32–36)
MCV RBC AUTO: 94.9 FL — SIGNIFICANT CHANGE UP (ref 80–100)
MONOCYTES # BLD AUTO: 0.46 K/UL — SIGNIFICANT CHANGE UP (ref 0–0.9)
MONOCYTES NFR BLD AUTO: 10.5 % — SIGNIFICANT CHANGE UP (ref 2–14)
NEUTROPHILS # BLD AUTO: 2.65 K/UL — SIGNIFICANT CHANGE UP (ref 1.8–7.4)
NEUTROPHILS NFR BLD AUTO: 60.3 % — SIGNIFICANT CHANGE UP (ref 43–77)
NRBC # BLD: 0 /100 WBCS — SIGNIFICANT CHANGE UP (ref 0–0)
PLATELET # BLD AUTO: 178 K/UL — SIGNIFICANT CHANGE UP (ref 150–400)
POTASSIUM SERPL-SCNC: 4.7 MMOL/L
PROT SERPL-MCNC: 6.7 G/DL
RBC # BLD: 3.33 M/UL — LOW (ref 3.8–5.2)
RBC # FLD: 13.6 % — SIGNIFICANT CHANGE UP (ref 10.3–14.5)
SODIUM SERPL-SCNC: 142 MMOL/L
TSH SERPL-ACNC: 1.66 UIU/ML
WBC # BLD: 4.39 K/UL — SIGNIFICANT CHANGE UP (ref 3.8–10.5)
WBC # FLD AUTO: 4.39 K/UL — SIGNIFICANT CHANGE UP (ref 3.8–10.5)

## 2023-02-22 PROCEDURE — 99214 OFFICE O/P EST MOD 30 MIN: CPT

## 2023-02-22 NOTE — PHYSICAL EXAM
[Restricted in physically strenuous activity but ambulatory and able to carry out work of a light or sedentary nature] : Status 1- Restricted in physically strenuous activity but ambulatory and able to carry out work of a light or sedentary nature, e.g., light house work, office work [Thin] : thin [Normal] : affect appropriate [de-identified] : acneiform rash on skin, and mild LE edema

## 2023-02-22 NOTE — REVIEW OF SYSTEMS
[Fatigue] : fatigue [Lower Ext Edema] : lower extremity edema [Skin Rash] : skin rash [Dizziness] : dizziness [Anxiety] : anxiety [Depression] : depression [Negative] : Allergic/Immunologic [Cough] : no cough [SOB on Exertion] : no shortness of breath during exertion [FreeTextEntry7] : GERD-like symptoms resolved [de-identified] : almost resolved [de-identified] : improved [de-identified] : affect better

## 2023-02-22 NOTE — HISTORY OF PRESENT ILLNESS
[Disease: _____________________] : Disease: [unfilled] [T: ___] : T[unfilled] [N: ___] : N[unfilled] [M: ___] : M[unfilled] [AJCC Stage: ____] : AJCC Stage: [unfilled] [Treatment Protocol] : Treatment Protocol [de-identified] : 82 year-old female with med hx of RA (Enbrel and hydroxycholorquine), osteoporosis (on prolia), dyslipidemia, started expereincing GI issues since COVID infection in May 2022. Went to PCP who referred her to ER for rt UQ pain- with concern of GB issues. CT scan at that time showed suspicious findings in the lung. Additional work up as below: \par \par CT chest A/P: LLL mass with mets to right lung, left pleura, bone (thoracic spine & R proximal femur), malignant left pleural effusion s/p thoracentesis (9/1/22), PE was also noted. She had recent EBUS/TBNA and thoracentesis on 9/1/22. Pathology with non-small cell lung adenocarcinoma, stage IV with malignant pleural effusion, lung primary\par \par PET/CT (Sept 2022) with FDG avid mass in the left lower lung likely corresponding to the newly diagnosed adenocarcinoma. FDG avid nodule in the lingula, satellite lesion versus metastasis. FDG avid nodular opacities in the right middle and right lower lobes; differential includes infectious and metastatic disease. FDG avid pleural thickening in the left lower lung, suspicious for metastasis. Multiple FDG avid mediastinal, and bilateral hilar lymph nodes compatible with metastases. FDG avid bilateral supraclavicular lymph nodes suspicious for additional metastatic lesions. The left supraclavicular lymph node is amenable to ultrasound-guided FNA biopsy as indicated. Scattered mildly FDG avid osseous foci in the axial skeleton and right proximal femur, concerning for metastases. FDG avid low-attenuation lesion within the enlarged left thyroid lobe. Differential includes benign and malignant etiologies. This may be further evaluated with ultrasound and possible FNA biopsy. Nonspecific diffuse gastric hypermetabolism without CT correlate. Please correlate clinically for gastritis. \par \par In terms of her symptoms, both patient and  endorsed that her symptoms of dyspnea did improve after the thoracentesis. Currently she feels well. She does have an decreased appetite and ongoing GI issues which have improved with PPI. \par \par 11/11/22: Pt here for follow up. She has no cough but she does have increased nausea and dizziness. She saw Dr. Street for symtom management. Dizziness mainly precedes dizziness preceding her bowel movements. Has some pain in her lower abdomen. Has 2-3 BMs per day, soft in consistency. She also notes increased bloating and fatigue. Appetite seems to be a little bettr and she has gained some weight. She admits to feeling depressed most of the time but she is not suicidal. \par \par 12/16/22: No issues today, Patient reports she feels better than her prior visit. She states she does not feel short of breath on exertion. No fevers, chills or cough. Patient reports some nausea with her osimertinib, otherwise no vomiting or diarrhea. She has persistent fatigue mid day. Rash on LE and mild ankle swelling\par \par 1/25/23: Overall improved with regards to all symptoms. No AEs from osi\par \par 2/22/23: Patient seen today for follow up. She continues on Tagrisso 80mg daily. She reports ongoing bilateral lower extremity swelling that she feels is from prednisone. Rash has improved. She also is leaning towards her right side because she has an issue with her toe and is going to physical therapy for this. \par  [de-identified] : adeno ca [FreeTextEntry1] : Tagrisso 80mg daily started 9/23/2022. \par Xgeva monthly

## 2023-03-15 ENCOUNTER — APPOINTMENT (OUTPATIENT)
Dept: GERIATRICS | Facility: CLINIC | Age: 83
End: 2023-03-15
Payer: MEDICARE

## 2023-03-15 VITALS
TEMPERATURE: 97.5 F | DIASTOLIC BLOOD PRESSURE: 74 MMHG | WEIGHT: 106.25 LBS | HEART RATE: 86 BPM | SYSTOLIC BLOOD PRESSURE: 135 MMHG | OXYGEN SATURATION: 98 % | HEIGHT: 60 IN | BODY MASS INDEX: 20.86 KG/M2 | RESPIRATION RATE: 16 BRPM

## 2023-03-15 PROCEDURE — 99215 OFFICE O/P EST HI 40 MIN: CPT

## 2023-03-16 RX ORDER — ATORVASTATIN CALCIUM 20 MG/1
20 TABLET, FILM COATED ORAL
Qty: 90 | Refills: 0 | Status: DISCONTINUED | COMMUNITY
Start: 2022-10-21 | End: 2023-03-16

## 2023-03-16 RX ORDER — HYDROCORTISONE 10 MG/G
1 CREAM TOPICAL TWICE DAILY
Qty: 2 | Refills: 0 | Status: DISCONTINUED | COMMUNITY
Start: 2023-01-09 | End: 2023-03-16

## 2023-03-16 RX ORDER — CLINDAMYCIN PHOSPHATE 1 G/10ML
1 GEL TOPICAL DAILY
Qty: 1 | Refills: 0 | Status: DISCONTINUED | COMMUNITY
Start: 2022-11-22 | End: 2023-03-16

## 2023-03-19 NOTE — PHYSICAL EXAM
[Alert] : alert [Sclera] : the sclera and conjunctiva were normal [EOMI] : extraocular movements were intact [PERRL] : pupils were equal in size, round, and reactive to light [Normal Oral Mucosa] : normal oral mucosa [Normal Outer Ear/Nose] : the ears and nose were normal in appearance [Both Tympanic Membranes Were Examined] : both tympanic membranes were normal [Normal Appearance] : the appearance of the neck was normal [Supple] : the neck was supple [No Respiratory Distress] : no respiratory distress [No Acc Muscle Use] : no accessory muscle use [Respiration, Rhythm And Depth] : normal respiratory rhythm and effort [Auscultation Breath Sounds / Voice Sounds] : lungs were clear to auscultation bilaterally [Normal S1, S2] : normal S1 and S2 [Heart Rate And Rhythm] : heart rate was normal and rhythm regular [Pedal Pulses Normal] : the pedal pulses are present [Bowel Sounds] : normal bowel sounds [Abdomen Tenderness] : non-tender [Abdomen Soft] : soft [Cervical Lymph Nodes Enlarged Posterior Bilaterally] : posterior cervical [Cervical Lymph Nodes Enlarged Anterior Bilaterally] : anterior cervical, supraclavicular [No CVA Tenderness] : no CVA  tenderness [No Spinal Tenderness] : no spinal tenderness [Normal Gait] : normal gait [Motor Tone] : muscle strength and tone were normal [No Focal Deficits] : no focal deficits [Oriented To Time, Place, And Person] : oriented to person, place, and time [Normal Affect] : the affect was normal [Normal Insight/Judgment] : insight and judgment were intact [Normal Mood] : the mood was normal [de-identified] : b/l 1+ LE edema  [de-identified] : acneiform rash on RLE- sensitive to touch,  no exudate or evidence of infection

## 2023-03-19 NOTE — HISTORY OF PRESENT ILLNESS
[Patient reported hearing was normal] : Patient reported hearing was normal [Patient reported vision is normal] : Patient reported vision is normal [Patient reported Patient reported dental screening is normal] : Patient reported dental screening is normal [No falls in past year] : Patient reported no falls in the past year [Completely Independent] : Completely independent. [] : Assistance needed with traveling/transport [Smoke Detector] : smoke detector [Carbon Monoxide Detector] : carbon monoxide detector [Night Light] : night light [0] : 2) Feeling down, depressed, or hopeless: Not at all (0) [PHQ-2 Negative - No further assessment needed] : PHQ-2 Negative - No further assessment needed [With Patient/Caregiver] : , with patient/caregiver [Reviewed no changes] : Reviewed, no changes [I will adhere to the patient's wishes.] : I will adhere to the patient's wishes. 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 Patient is accompanied by her , Gustavo. \par \par Leg burning/rash:\par -patient has had multiple skin manifestations since starting Tagrisso\par -systemic pruritus has significantly improved on prednisone 5mg qd\par -now with "burning" in both legs\par -skin is very sensitive to touch \par -erythematous acneiform rash on right shin/ankle is painful\par -was prescribed clindamycin cream in the past for this but has been unable to use it because it burns upon application \par -has been moisturizing with eucerin/aquaphor multiple times per day \par \par \par Since last visit, patient had cataract surgery which went well. States that vision has improved significantly since procedure. \par \par Patient saw pulm (Dr. Dos Santos) on 2/6/23 for small-moderate left sided malignant effusion. Note appreciated. Planning to monitor for symptoms and will intervene with thoracentesis vs TIPC if necessary. \par \par Saw hemeonc on 2/22/23. PET-CT (2/2023) showed decreased size and metabolism of the left lung mass and reduced/resolved supraclavicular, mediastinal, lingular, perihilar lymph nodes, and increasing sclerosis of osseous lesions. Advised to c/w  Tagrisso 80 mg qd and Xgeva for bone mets. \par  [TextBox_25] : has OP- previously on Prolia, now on Xgeva [TextBox_43] : follows with dentist regularly [Driving Concerns] : not driving or driving without noted concerns [de-identified] : 6 [FreeTextEntry6] : patient has not driven for the last ~20 years [de-identified] : 7 [de-identified] : no DME [[id number]] : 0 [AdvancecareDate] : 03/23 [FreeTextEntry4] : - primary HCP is , Gustavo [phone] and secondary HCP is  son, Richardson [phone] \par -previously discussed MOLST form at length; when discussing CPR, patient states "I don't think I would want that"\par -patient and  wish to discuss MOLST with their son who is a doctor. Provided copy of MOLST to review at home. Plan to discuss again at next follow up.

## 2023-03-19 NOTE — REVIEW OF SYSTEMS
[Lower Ext Edema] : lower extremity edema [As Noted in HPI] : as noted in HPI [Feeling Poorly] : not feeling poorly [Feeling Tired] : not feeling tired [Discharge From Eyes] : no purulent discharge from the eyes [Dry Eyes] : no dryness of the eyes [Nasal Discharge] : no nasal discharge [Sore Throat] : no sore throat [Chest Pain] : no chest pain [Palpitations] : no palpitations [Shortness Of Breath] : no shortness of breath [Wheezing] : no wheezing [Cough] : no cough [SOB on Exertion] : no shortness of breath during exertion [Vomiting] : no vomiting [Abdominal Pain] : no abdominal pain [Constipation] : no constipation [Diarrhea] : no diarrhea [Dysuria] : no dysuria [Incontinence] : no incontinence [Limb Pain] : no limb pain [Skin Wound] : no skin wound [Suicidal] : not suicidal [Dizziness] : no dizziness [de-identified] : rash, pruritis, sensitive to touch

## 2023-03-19 NOTE — REASON FOR VISIT
[Acute] : an acute visit [Spouse] : spouse [Family Member] : family member [FreeTextEntry1] : leg "burning"/ rash  [FreeTextEntry3] : spouse, Gustavo

## 2023-03-21 ENCOUNTER — APPOINTMENT (OUTPATIENT)
Dept: DERMATOLOGY | Facility: CLINIC | Age: 83
End: 2023-03-21
Payer: MEDICARE

## 2023-03-21 PROCEDURE — 99204 OFFICE O/P NEW MOD 45 MIN: CPT

## 2023-03-24 ENCOUNTER — APPOINTMENT (OUTPATIENT)
Dept: HEMATOLOGY ONCOLOGY | Facility: CLINIC | Age: 83
End: 2023-03-24
Payer: MEDICARE

## 2023-03-24 ENCOUNTER — APPOINTMENT (OUTPATIENT)
Dept: INFUSION THERAPY | Facility: HOSPITAL | Age: 83
End: 2023-03-24

## 2023-03-24 ENCOUNTER — RESULT REVIEW (OUTPATIENT)
Age: 83
End: 2023-03-24

## 2023-03-24 VITALS
BODY MASS INDEX: 19.81 KG/M2 | HEART RATE: 83 BPM | DIASTOLIC BLOOD PRESSURE: 75 MMHG | TEMPERATURE: 97.5 F | OXYGEN SATURATION: 97 % | WEIGHT: 101.41 LBS | RESPIRATION RATE: 16 BRPM | SYSTOLIC BLOOD PRESSURE: 111 MMHG

## 2023-03-24 LAB
BASOPHILS # BLD AUTO: 0.02 K/UL — SIGNIFICANT CHANGE UP (ref 0–0.2)
BASOPHILS NFR BLD AUTO: 0.7 % — SIGNIFICANT CHANGE UP (ref 0–2)
EOSINOPHIL # BLD AUTO: 0.01 K/UL — SIGNIFICANT CHANGE UP (ref 0–0.5)
EOSINOPHIL NFR BLD AUTO: 0.3 % — SIGNIFICANT CHANGE UP (ref 0–6)
HCT VFR BLD CALC: 32.7 % — LOW (ref 34.5–45)
HGB BLD-MCNC: 9.9 G/DL — LOW (ref 11.5–15.5)
IMM GRANULOCYTES NFR BLD AUTO: 0.3 % — SIGNIFICANT CHANGE UP (ref 0–0.9)
LYMPHOCYTES # BLD AUTO: 0.81 K/UL — LOW (ref 1–3.3)
LYMPHOCYTES # BLD AUTO: 27.8 % — SIGNIFICANT CHANGE UP (ref 13–44)
MCHC RBC-ENTMCNC: 28.8 PG — SIGNIFICANT CHANGE UP (ref 27–34)
MCHC RBC-ENTMCNC: 30.3 G/DL — LOW (ref 32–36)
MCV RBC AUTO: 95.1 FL — SIGNIFICANT CHANGE UP (ref 80–100)
MONOCYTES # BLD AUTO: 0.41 K/UL — SIGNIFICANT CHANGE UP (ref 0–0.9)
MONOCYTES NFR BLD AUTO: 14.1 % — HIGH (ref 2–14)
NEUTROPHILS # BLD AUTO: 1.65 K/UL — LOW (ref 1.8–7.4)
NEUTROPHILS NFR BLD AUTO: 56.8 % — SIGNIFICANT CHANGE UP (ref 43–77)
NRBC # BLD: 0 /100 WBCS — SIGNIFICANT CHANGE UP (ref 0–0)
PLATELET # BLD AUTO: 134 K/UL — LOW (ref 150–400)
RBC # BLD: 3.44 M/UL — LOW (ref 3.8–5.2)
RBC # FLD: 13 % — SIGNIFICANT CHANGE UP (ref 10.3–14.5)
WBC # BLD: 2.91 K/UL — LOW (ref 3.8–10.5)
WBC # FLD AUTO: 2.91 K/UL — LOW (ref 3.8–10.5)

## 2023-03-24 PROCEDURE — 99215 OFFICE O/P EST HI 40 MIN: CPT

## 2023-03-24 NOTE — HISTORY OF PRESENT ILLNESS
[Disease: _____________________] : Disease: [unfilled] [T: ___] : T[unfilled] [N: ___] : N[unfilled] [M: ___] : M[unfilled] [AJCC Stage: ____] : AJCC Stage: [unfilled] [Treatment Protocol] : Treatment Protocol [de-identified] : 82 year-old female with med hx of RA (Enbrel and hydroxycholorquine), osteoporosis (on prolia), dyslipidemia, started expereincing GI issues since COVID infection in May 2022. Went to PCP who referred her to ER for rt UQ pain- with concern of GB issues. CT scan at that time showed suspicious findings in the lung. Additional work up as below: \par \par CT chest A/P: LLL mass with mets to right lung, left pleura, bone (thoracic spine & R proximal femur), malignant left pleural effusion s/p thoracentesis (9/1/22), PE was also noted. She had recent EBUS/TBNA and thoracentesis on 9/1/22. Pathology with non-small cell lung adenocarcinoma, stage IV with malignant pleural effusion, lung primary\par \par PET/CT (Sept 2022) with FDG avid mass in the left lower lung likely corresponding to the newly diagnosed adenocarcinoma. FDG avid nodule in the lingula, satellite lesion versus metastasis. FDG avid nodular opacities in the right middle and right lower lobes; differential includes infectious and metastatic disease. FDG avid pleural thickening in the left lower lung, suspicious for metastasis. Multiple FDG avid mediastinal, and bilateral hilar lymph nodes compatible with metastases. FDG avid bilateral supraclavicular lymph nodes suspicious for additional metastatic lesions. The left supraclavicular lymph node is amenable to ultrasound-guided FNA biopsy as indicated. Scattered mildly FDG avid osseous foci in the axial skeleton and right proximal femur, concerning for metastases. FDG avid low-attenuation lesion within the enlarged left thyroid lobe. Differential includes benign and malignant etiologies. This may be further evaluated with ultrasound and possible FNA biopsy. Nonspecific diffuse gastric hypermetabolism without CT correlate. Please correlate clinically for gastritis. \par \par In terms of her symptoms, both patient and  endorsed that her symptoms of dyspnea did improve after the thoracentesis. Currently she feels well. She does have an decreased appetite and ongoing GI issues which have improved with PPI. \par \par 11/11/22: Pt here for follow up. She has no cough but she does have increased nausea and dizziness. She saw Dr. Street for symtom management. Dizziness mainly precedes dizziness preceding her bowel movements. Has some pain in her lower abdomen. Has 2-3 BMs per day, soft in consistency. She also notes increased bloating and fatigue. Appetite seems to be a little bettr and she has gained some weight. She admits to feeling depressed most of the time but she is not suicidal. \par \par 12/16/22: No issues today, Patient reports she feels better than her prior visit. She states she does not feel short of breath on exertion. No fevers, chills or cough. Patient reports some nausea with her osimertinib, otherwise no vomiting or diarrhea. She has persistent fatigue mid day. Rash on LE and mild ankle swelling\par \par 1/25/23: Overall improved with regards to all symptoms. No AEs from osi\par \par 2/22/23: Patient seen today for follow up. She continues on Tagrisso 80mg daily. She reports ongoing bilateral lower extremity swelling that she feels is from prednisone. Rash has improved. She also is leaning towards her right side because she has an issue with her toe and is going to physical therapy for this. \par \par 3/24/23: pt has LE swelling and rash over it. Pt has been taking prednisone 2.5 mg, which was started by her PCP for joint pains.  [de-identified] : adeno ca [FreeTextEntry1] : Tagrisso 80mg daily started 9/23/2022. \par Xgeva monthly

## 2023-03-24 NOTE — PHYSICAL EXAM
[Restricted in physically strenuous activity but ambulatory and able to carry out work of a light or sedentary nature] : Status 1- Restricted in physically strenuous activity but ambulatory and able to carry out work of a light or sedentary nature, e.g., light house work, office work [Thin] : thin [Normal] : affect appropriate [de-identified] : acneiform rash on skin, and mild LE edema

## 2023-03-24 NOTE — REVIEW OF SYSTEMS
[Fatigue] : fatigue [Lower Ext Edema] : lower extremity edema [Skin Rash] : skin rash [Dizziness] : dizziness [Anxiety] : anxiety [Depression] : depression [Negative] : Allergic/Immunologic [Cough] : no cough [SOB on Exertion] : no shortness of breath during exertion [FreeTextEntry5] : improved [FreeTextEntry7] : GERD-like symptoms resolved [de-identified] : improved [de-identified] : improved [de-identified] : affect better

## 2023-03-24 NOTE — ASSESSMENT
[FreeTextEntry1] : 83 yo w with some comorbidities, including RA, with stage IV diagnosed lung adeno ca\par Baseline PET/CT showed FDG avid mass in the left lower lung likely corresponding to the newly diagnosed adenocarcinoma. FDG avid nodule in the lingula, satellite lesion versus metastasis. FDG avid nodular opacities in the right middle and right lower lobes; differential includes infectious and metastatic disease. FDG avid pleural thickening in the left lower lung, suspicious for metastasis. Multiple FDG avid mediastinal, and bilateral hilar lymph nodes compatible with metastases. FDG avid bilateral supraclavicular lymph nodes suspicious for additional metastatic lesions. The left supraclavicular lymph node is amenable to ultrasound-guided FNA biopsy as indicated.  Scattered mildly FDG avid osseous foci in the axial skeleton and right proximal femur, concerning for metastases.  FDG avid low-attenuation lesion within the enlarged left thyroid lobe. Differential includes benign and malignant etiologies. This may be further evaluated with ultrasound and possible FNA biopsy. Nonspecific diffuse gastric hypermetabolism without CT correlate. Please correlate clinically for gastritis. \par Brain MRI showed no mets\par PDL1 40% and NGS showed multiple muts, importantly, EGFR exon 18 and exon 20 muts \par We discussed at depth dx of lung adeno ca, tumor genetics and palliative intent of tx\par Pt started on osimertinib 80 mg daily on 9/23/22\par She is tolerating this well and most of her symptoms have resolved\par I reviewed recent scans from December 2022 and note significant improvement in disease burden in LAD. Healed bony mets were noted. I also reviewed recent PET/CT done Febuary 2023 showed decreased size and metabolism if the left lung mass, and reduced/resolved supraclavicular, mediastinal, lingular, perihilar lymph nodes, and increasing sclerosis of osseous lesions. \par Brain MRI at baseline showed no mets\par CEA is stable in the 30s\par Continue Tagrisso 80mg daily \par Continue Xgeva for bone mets \par Discussed administration, potential AEs, importance of close monitoring with labs, toxicity and efficacy assessment, and EKG while on this high risk medication\par LE swelling likely sec to multifactorial- including perhaps prednisone and venous stasis. Follow up with vascular. \par OV in 3 months after new scans\par repeat MRI Q 6 months. \par Labs today\par \par \par \par

## 2023-03-27 ENCOUNTER — NON-APPOINTMENT (OUTPATIENT)
Age: 83
End: 2023-03-27

## 2023-03-27 LAB
ALBUMIN SERPL ELPH-MCNC: 3.8 G/DL
ALP BLD-CCNC: 55 U/L
ALT SERPL-CCNC: 15 U/L
ANION GAP SERPL CALC-SCNC: 13 MMOL/L
AST SERPL-CCNC: 18 U/L
BILIRUB SERPL-MCNC: 0.2 MG/DL
BUN SERPL-MCNC: 26 MG/DL
CALCIUM SERPL-MCNC: 8.7 MG/DL
CEA SERPL-MCNC: 33.5 NG/ML
CHLORIDE SERPL-SCNC: 106 MMOL/L
CO2 SERPL-SCNC: 23 MMOL/L
CREAT SERPL-MCNC: 1.42 MG/DL
EGFR: 37 ML/MIN/1.73M2
GLUCOSE SERPL-MCNC: 88 MG/DL
MAGNESIUM SERPL-MCNC: 2.2 MG/DL
POTASSIUM SERPL-SCNC: 4.9 MMOL/L
PROT SERPL-MCNC: 6.8 G/DL
SODIUM SERPL-SCNC: 142 MMOL/L
TSH SERPL-ACNC: 1.11 UIU/ML

## 2023-03-28 ENCOUNTER — NON-APPOINTMENT (OUTPATIENT)
Age: 83
End: 2023-03-28

## 2023-04-06 ENCOUNTER — APPOINTMENT (OUTPATIENT)
Dept: GERIATRICS | Facility: CLINIC | Age: 83
End: 2023-04-06
Payer: MEDICARE

## 2023-04-06 VITALS
OXYGEN SATURATION: 99 % | RESPIRATION RATE: 16 BRPM | DIASTOLIC BLOOD PRESSURE: 60 MMHG | SYSTOLIC BLOOD PRESSURE: 120 MMHG | TEMPERATURE: 97.5 F | HEART RATE: 78 BPM | HEIGHT: 60 IN | BODY MASS INDEX: 19.14 KG/M2 | WEIGHT: 97.5 LBS

## 2023-04-06 PROCEDURE — 99215 OFFICE O/P EST HI 40 MIN: CPT

## 2023-04-06 PROCEDURE — G0444 DEPRESSION SCREEN ANNUAL: CPT

## 2023-04-06 RX ORDER — HYDROXYCHLOROQUINE SULFATE 200 MG/1
200 TABLET, FILM COATED ORAL
Qty: 180 | Refills: 0 | Status: ACTIVE | COMMUNITY
Start: 2022-08-26

## 2023-04-06 NOTE — ASSESSMENT
[FreeTextEntry1] : Unclear why pt is taking prednisone...RA? Mets? Will ask for clarification from oncologist. May consider tapering off if able.\par \par CRN increased, pt is fasting and may be dehydration. \par \par Will discuss care plan with Dr. Persaud\par \par

## 2023-04-06 NOTE — HISTORY OF PRESENT ILLNESS
[FreeTextEntry1] : Yehuda Note.\par \par My first visit with pt is today, being seen by Tiny Hunt. \par \par 83 yo female with adenocarcinoma of lung with primary lesion on LLL mass mets to right lung, left pleura, bone (thoracic spine and right prxoimal femur, malignant left peural effusion 9/1/22with PE (now on eliquis). Pathology identified as NSCLung Ca adenocarcioma, stage IV.\par \par Pt denies dyspnea. States she has overwhelming fatigue and limited appetite. Pt weighs 97#, has loss 10 pounds in 2 months (feb 2023=108#).\par \par No falls. No injuries. Pt is on prednisone which has been tapered down to 2.5 mg a day. Pt asking if she needs to continue it.\par Pt was given gabapentin for sleep and did not tolerate it.  Pt has loss of appetite since using gabapentin. Last used 2 weeks ago. Loss of weight continues, no appetite.\par \par Eliquis for pulmonary embolus\par \par RA - not taking embrel now\par Osteoprosis - not using prolia \par \par Not sleeping at night, sleeps about 4 hours , wakes up to go to bathroom 3 times a night - pt is anxious and cant go back to bed.\par \par Pt has never taken flu shot\par \par Eating less, fasting now for one more week. \par \par Crn= 1.42, higher than in past.  Pt with little appetite. Fasting now. Advised not to fast. Pt wants to fast and will do so for another week. Pt able to take ensure and water. [0] : 2) Feeling down, depressed, or hopeless: Not at all (0) [PHQ-2 Negative - No further assessment needed] : PHQ-2 Negative - No further assessment needed [I have developed a follow-up plan documented below in the note.] : I have developed a follow-up plan documented below in the note. [[id number]] : 0 [Name: ___] : Health Care Proxy's Name: [unfilled]  [Designated Healthcare Proxy] : Designated healthcare proxy [Relationship: ___] : Relationship: [unfilled] [Aggressive treatment] : aggressive treatment [I will adhere to the patient's wishes.] : I will adhere to the patient's wishes. [FreeTextEntry4] : Discussed her care plan. She likes the plan of care she is receiving. She asked about other paths and more comfort focused care was provided information but pt says she is witohout pain. She has overwhelming fatigue , no energy.

## 2023-04-06 NOTE — PHYSICAL EXAM
[Alert] : alert [No Acute Distress] : in no acute distress [Normal Outer Ear/Nose] : the ears and nose were normal in appearance [Normal Appearance] : the appearance of the neck was normal [Supple] : the neck was supple [No Respiratory Distress] : no respiratory distress [No Acc Muscle Use] : no accessory muscle use [Respiration, Rhythm And Depth] : normal respiratory rhythm and effort [Auscultation Breath Sounds / Voice Sounds] : lungs were clear to auscultation bilaterally [Heart Rate And Rhythm] : heart rate was normal and rhythm regular [Abdomen Tenderness] : non-tender [Bowel Sounds] : normal bowel sounds [Abdomen Soft] : soft [No Spinal Tenderness] : no spinal tenderness [Normal Color / Pigmentation] : normal skin color and pigmentation [Normal Turgor] : normal skin turgor [Normal] : no focal deficits [No Focal Deficits] : no focal deficits [Normal Affect] : the affect was normal [Normal Mood] : the mood was normal [de-identified] : thin frail woman [FreeTextEntry1] : left eye smaller

## 2023-04-06 NOTE — REVIEW OF SYSTEMS
[As Noted in HPI] : as noted in HPI [Fever] : no fever [Chills] : no chills [Feeling Poorly] : not feeling poorly [Feeling Tired] : feeling tired [Recent Weight Loss (___ Lbs)] : recent [unfilled] ~Ulb weight loss [Negative] : Heme/Lymph [FreeTextEntry2] : fatigued, ancious

## 2023-04-17 ENCOUNTER — NON-APPOINTMENT (OUTPATIENT)
Age: 83
End: 2023-04-17

## 2023-04-19 ENCOUNTER — OUTPATIENT (OUTPATIENT)
Dept: OUTPATIENT SERVICES | Facility: HOSPITAL | Age: 83
LOS: 1 days | Discharge: HOME | End: 2023-04-19

## 2023-04-19 DIAGNOSIS — Z90.89 ACQUIRED ABSENCE OF OTHER ORGANS: Chronic | ICD-10-CM

## 2023-04-19 DIAGNOSIS — C34.90 MALIGNANT NEOPLASM OF UNSPECIFIED PART OF UNSPECIFIED BRONCHUS OR LUNG: ICD-10-CM

## 2023-04-19 DIAGNOSIS — Z98.49 CATARACT EXTRACTION STATUS, UNSPECIFIED EYE: Chronic | ICD-10-CM

## 2023-04-25 ENCOUNTER — APPOINTMENT (OUTPATIENT)
Dept: INFUSION THERAPY | Facility: HOSPITAL | Age: 83
End: 2023-04-25

## 2023-04-25 DIAGNOSIS — C79.51 SECONDARY MALIGNANT NEOPLASM OF BONE: ICD-10-CM

## 2023-04-27 ENCOUNTER — NON-APPOINTMENT (OUTPATIENT)
Age: 83
End: 2023-04-27

## 2023-05-09 ENCOUNTER — APPOINTMENT (OUTPATIENT)
Dept: GERIATRICS | Facility: CLINIC | Age: 83
End: 2023-05-09
Payer: MEDICARE

## 2023-05-09 VITALS
TEMPERATURE: 97.3 F | SYSTOLIC BLOOD PRESSURE: 132 MMHG | HEIGHT: 60 IN | DIASTOLIC BLOOD PRESSURE: 69 MMHG | WEIGHT: 100.38 LBS | OXYGEN SATURATION: 99 % | HEART RATE: 80 BPM | BODY MASS INDEX: 19.71 KG/M2 | RESPIRATION RATE: 16 BRPM

## 2023-05-09 DIAGNOSIS — J18.1 LOBAR PNEUMONIA, UNSPECIFIED ORGANISM: ICD-10-CM

## 2023-05-09 DIAGNOSIS — R97.0 ELEVATED CARCINOEMBRYONIC ANTIGEN [CEA]: ICD-10-CM

## 2023-05-09 PROCEDURE — 99214 OFFICE O/P EST MOD 30 MIN: CPT

## 2023-05-09 RX ORDER — ONDANSETRON 4 MG/1
4 TABLET, ORALLY DISINTEGRATING ORAL
Qty: 30 | Refills: 1 | Status: COMPLETED | COMMUNITY
Start: 2022-08-26 | End: 2023-05-09

## 2023-05-09 RX ORDER — GABAPENTIN 100 MG/1
100 CAPSULE ORAL
Qty: 30 | Refills: 0 | Status: COMPLETED | COMMUNITY
Start: 2023-03-16 | End: 2023-05-09

## 2023-05-09 RX ORDER — CHOLECALCIFEROL (VITAMIN D3) 25 MCG
25 MCG TABLET,CHEWABLE ORAL
Refills: 0 | Status: COMPLETED | COMMUNITY
Start: 2023-04-06 | End: 2023-05-09

## 2023-05-09 RX ORDER — SODIUM CHLORIDE 0.65 %
0.65 AEROSOL, SPRAY (ML) NASAL TWICE DAILY
Qty: 1 | Refills: 0 | Status: COMPLETED | COMMUNITY
Start: 2022-10-10 | End: 2023-05-09

## 2023-05-10 PROBLEM — J18.1 CONSOLIDATION OF LEFT LOWER LOBE OF LUNG: Status: ACTIVE | Noted: 2022-08-29

## 2023-05-10 PROBLEM — R97.0 ELEVATED CEA: Status: ACTIVE | Noted: 2022-12-16

## 2023-05-10 LAB
ALBUMIN SERPL ELPH-MCNC: 3.7 G/DL
ALP BLD-CCNC: 50 U/L
ALT SERPL-CCNC: 26 U/L
ANION GAP SERPL CALC-SCNC: 9 MMOL/L
AST SERPL-CCNC: 20 U/L
BASOPHILS # BLD AUTO: 0.01 K/UL
BASOPHILS NFR BLD AUTO: 0.3 %
BILIRUB SERPL-MCNC: 0.2 MG/DL
BUN SERPL-MCNC: 41 MG/DL
CALCIUM SERPL-MCNC: 9.1 MG/DL
CHLORIDE SERPL-SCNC: 109 MMOL/L
CO2 SERPL-SCNC: 24 MMOL/L
CREAT SERPL-MCNC: 1.27 MG/DL
EGFR: 42 ML/MIN/1.73M2
EOSINOPHIL # BLD AUTO: 0.02 K/UL
EOSINOPHIL NFR BLD AUTO: 0.6 %
GLUCOSE SERPL-MCNC: 144 MG/DL
HCT VFR BLD CALC: 29 %
HGB BLD-MCNC: 8.7 G/DL
IMM GRANULOCYTES NFR BLD AUTO: 0.3 %
LYMPHOCYTES # BLD AUTO: 0.82 K/UL
LYMPHOCYTES NFR BLD AUTO: 23 %
MAN DIFF?: NORMAL
MCHC RBC-ENTMCNC: 28.7 PG
MCHC RBC-ENTMCNC: 30 GM/DL
MCV RBC AUTO: 95.7 FL
MONOCYTES # BLD AUTO: 0.32 K/UL
MONOCYTES NFR BLD AUTO: 9 %
NEUTROPHILS # BLD AUTO: 2.38 K/UL
NEUTROPHILS NFR BLD AUTO: 66.8 %
PLATELET # BLD AUTO: 144 K/UL
POTASSIUM SERPL-SCNC: 4.3 MMOL/L
PROT SERPL-MCNC: 6.4 G/DL
RBC # BLD: 3.03 M/UL
RBC # FLD: 14.9 %
SODIUM SERPL-SCNC: 141 MMOL/L
WBC # FLD AUTO: 3.56 K/UL

## 2023-05-10 NOTE — HISTORY OF PRESENT ILLNESS
[FreeTextEntry1] : 83 yo female with lung cancer mets on tagrisso and had lost weight. Pt seen on followup. In past tried to explore patients goals. Pt continues to like the plan of care. She denies severe pain. Admits to being anxious. COmplaining of loss of taste and fatigue.\par \par Patient does not have sob.  \par \par Since last visit she is eating more, gained 3 pounds. \par She has back pain and pain radiating to left leg.\par \par She went to rheumatologist who orderd xrays of spine and legs that shows right femur lesion, but pt does not have pain on that side.Pt seeing Dr. Persaud in 2 weeks.\par \par Pt with anemia, baseline gn =11 and last hb was 9. Pt and  says "she has always had anemial"\par \par Pt feels dizzy, has not fallen. Declines using cane or walker. [No falls in past year] : Patient reported no falls in the past year [0] : 2) Feeling down, depressed, or hopeless: Not at all (0) [Designated Healthcare Proxy] : Designated healthcare proxy [Aggressive treatment] : aggressive treatment [FreeTextEntry4] : ana cristina is hcp

## 2023-05-10 NOTE — REVIEW OF SYSTEMS
[As Noted in HPI] : as noted in HPI [Feeling Tired] : feeling tired [Negative] : Heme/Lymph [FreeTextEntry7] : poor taste

## 2023-05-10 NOTE — PHYSICAL EXAM
[Alert] : alert [Normal Outer Ear/Nose] : the ears and nose were normal in appearance [Normal Appearance] : the appearance of the neck was normal [Supple] : the neck was supple [No Respiratory Distress] : no respiratory distress [No Acc Muscle Use] : no accessory muscle use [Respiration, Rhythm And Depth] : normal respiratory rhythm and effort [Auscultation Breath Sounds / Voice Sounds] : lungs were clear to auscultation bilaterally [Heart Rate And Rhythm] : heart rate was normal and rhythm regular [Bowel Sounds] : normal bowel sounds [Abdomen Tenderness] : non-tender [Abdomen Soft] : soft [No Spinal Tenderness] : no spinal tenderness [Normal Color / Pigmentation] : normal skin color and pigmentation [Normal Turgor] : normal skin turgor [Normal] : no focal deficits [No Focal Deficits] : no focal deficits [Normal Affect] : the affect was normal [Normal Mood] : the mood was normal [de-identified] : thin frail woman

## 2023-05-12 ENCOUNTER — NON-APPOINTMENT (OUTPATIENT)
Age: 83
End: 2023-05-12

## 2023-05-12 ENCOUNTER — RESULT REVIEW (OUTPATIENT)
Age: 83
End: 2023-05-12

## 2023-05-16 ENCOUNTER — OUTPATIENT (OUTPATIENT)
Dept: OUTPATIENT SERVICES | Facility: HOSPITAL | Age: 83
LOS: 1 days | End: 2023-05-16
Payer: MEDICARE

## 2023-05-16 ENCOUNTER — APPOINTMENT (OUTPATIENT)
Dept: CT IMAGING | Facility: CLINIC | Age: 83
End: 2023-05-16
Payer: MEDICARE

## 2023-05-16 DIAGNOSIS — Z98.49 CATARACT EXTRACTION STATUS, UNSPECIFIED EYE: Chronic | ICD-10-CM

## 2023-05-16 DIAGNOSIS — Z90.89 ACQUIRED ABSENCE OF OTHER ORGANS: Chronic | ICD-10-CM

## 2023-05-16 DIAGNOSIS — C34.92 MALIGNANT NEOPLASM OF UNSPECIFIED PART OF LEFT BRONCHUS OR LUNG: ICD-10-CM

## 2023-05-16 PROCEDURE — 74160 CT ABDOMEN W/CONTRAST: CPT

## 2023-05-16 PROCEDURE — 71260 CT THORAX DX C+: CPT | Mod: 26

## 2023-05-16 PROCEDURE — 71260 CT THORAX DX C+: CPT

## 2023-05-16 PROCEDURE — 74160 CT ABDOMEN W/CONTRAST: CPT | Mod: 26

## 2023-05-23 ENCOUNTER — RESULT REVIEW (OUTPATIENT)
Age: 83
End: 2023-05-23

## 2023-05-23 ENCOUNTER — APPOINTMENT (OUTPATIENT)
Dept: INFUSION THERAPY | Facility: HOSPITAL | Age: 83
End: 2023-05-23

## 2023-05-23 ENCOUNTER — APPOINTMENT (OUTPATIENT)
Dept: HEMATOLOGY ONCOLOGY | Facility: CLINIC | Age: 83
End: 2023-05-23
Payer: MEDICARE

## 2023-05-23 VITALS
DIASTOLIC BLOOD PRESSURE: 77 MMHG | BODY MASS INDEX: 19.16 KG/M2 | WEIGHT: 98.1 LBS | TEMPERATURE: 97.5 F | SYSTOLIC BLOOD PRESSURE: 137 MMHG | OXYGEN SATURATION: 100 % | RESPIRATION RATE: 14 BRPM | HEART RATE: 75 BPM

## 2023-05-23 LAB
BASOPHILS # BLD AUTO: 0.01 K/UL — SIGNIFICANT CHANGE UP (ref 0–0.2)
BASOPHILS NFR BLD AUTO: 0.4 % — SIGNIFICANT CHANGE UP (ref 0–2)
EOSINOPHIL # BLD AUTO: 0.02 K/UL — SIGNIFICANT CHANGE UP (ref 0–0.5)
EOSINOPHIL NFR BLD AUTO: 0.7 % — SIGNIFICANT CHANGE UP (ref 0–6)
HCT VFR BLD CALC: 31.6 % — LOW (ref 34.5–45)
HGB BLD-MCNC: 9.7 G/DL — LOW (ref 11.5–15.5)
IMM GRANULOCYTES NFR BLD AUTO: 0.4 % — SIGNIFICANT CHANGE UP (ref 0–0.9)
LYMPHOCYTES # BLD AUTO: 0.71 K/UL — LOW (ref 1–3.3)
LYMPHOCYTES # BLD AUTO: 26.2 % — SIGNIFICANT CHANGE UP (ref 13–44)
MCHC RBC-ENTMCNC: 29 PG — SIGNIFICANT CHANGE UP (ref 27–34)
MCHC RBC-ENTMCNC: 30.7 G/DL — LOW (ref 32–36)
MCV RBC AUTO: 94.3 FL — SIGNIFICANT CHANGE UP (ref 80–100)
MONOCYTES # BLD AUTO: 0.25 K/UL — SIGNIFICANT CHANGE UP (ref 0–0.9)
MONOCYTES NFR BLD AUTO: 9.2 % — SIGNIFICANT CHANGE UP (ref 2–14)
NEUTROPHILS # BLD AUTO: 1.71 K/UL — LOW (ref 1.8–7.4)
NEUTROPHILS NFR BLD AUTO: 63.1 % — SIGNIFICANT CHANGE UP (ref 43–77)
NRBC # BLD: 0 /100 WBCS — SIGNIFICANT CHANGE UP (ref 0–0)
PLATELET # BLD AUTO: 141 K/UL — LOW (ref 150–400)
RBC # BLD: 3.35 M/UL — LOW (ref 3.8–5.2)
RBC # FLD: 14.9 % — HIGH (ref 10.3–14.5)
WBC # BLD: 2.71 K/UL — LOW (ref 3.8–10.5)
WBC # FLD AUTO: 2.71 K/UL — LOW (ref 3.8–10.5)

## 2023-05-23 PROCEDURE — 99215 OFFICE O/P EST HI 40 MIN: CPT

## 2023-05-23 NOTE — ASSESSMENT
[FreeTextEntry1] : 81 yo w with some comorbidities, including RA, with stage IV diagnosed lung adeno ca\par Baseline PET/CT showed FDG avid mass in the left lower lung likely corresponding to the newly diagnosed adenocarcinoma with multiple areas suspicious for metastasis including a nodule in the lingula, nodular opacities in the right middle and right lower lobes,  pleural thickening in the left lower lung, multiple FDG avid mediastinal and bilateral hilar lymph nodes, bilateral supraclavicular lymph nodes, scattered mildly FDG avid osseous foci in the axial skeleton and right proximal femur, and FDG avid low-attenuation lesion within the enlarged left thyroid lobe.\par Brain MRI showed no mets\par PDL1 40% and NGS showed multiple muts, importantly, EGFR exon 18 and exon 20 muts \par We discussed at depth dx of lung adeno ca, tumor genetics and palliative intent of tx\par Pt started on osimertinib 80 mg daily on 9/23/22\par She is tolerating this well and most of her symptoms have resolved\par Scans from December 2022 noted significant improvement in disease burden in LAD. Healed bony mets were noted. PET/CT February 2023 showed decreased size and metabolism if the left lung mass, and reduced/resolved supraclavicular, mediastinal, lingular, perihilar lymph nodes, and increasing sclerosis of osseous lesions. \par Brain MRI at baseline showed no mets\par CEA is stable in the 30s\par Continue Tagrisso 80mg daily \par Continue Xgeva for bone mets \par Discussed administration, potential AEs, importance of close monitoring with labs, toxicity and efficacy assessment, and EKG while on this high risk medication\par LE swelling likely sec to multifactorial- including perhaps prednisone and venous stasis. Follow up with vascular. \par Clindamycin 1% gel up to TID for lower extremity acneiform lesions that may be related to Tagrisso\par Moisturizing cream for subcutaneous lesion of right breast\par OV in 3 months\par repeat MRI Q 6 months, due June 2023\par Labs today\par \par \par I was with the hematology/ oncology fellow, Dr. Nettles for the entire visit and agree with above documentation\par \par

## 2023-05-23 NOTE — HISTORY OF PRESENT ILLNESS
[Disease: _____________________] : Disease: [unfilled] [T: ___] : T[unfilled] [N: ___] : N[unfilled] [M: ___] : M[unfilled] [AJCC Stage: ____] : AJCC Stage: [unfilled] [Treatment Protocol] : Treatment Protocol [de-identified] : 82 year-old female with med hx of RA (Enbrel and hydroxycholorquine), osteoporosis (on prolia), dyslipidemia, started experiencing GI issues since COVID infection in May 2022. Went to PCP who referred her to ER for rt UQ pain- with concern of GB issues. CT scan at that time showed suspicious findings in the lung. Additional work up as below: \par \par CT chest A/P: LLL mass with mets to right lung, left pleura, bone (thoracic spine & R proximal femur), malignant left pleural effusion s/p thoracentesis (9/1/22), PE was also noted. She had recent EBUS/TBNA and thoracentesis on 9/1/22. Pathology with non-small cell lung adenocarcinoma, stage IV with malignant pleural effusion, lung primary\par \par PET/CT (Sept 2022) with FDG avid mass in the left lower lung likely corresponding to the newly diagnosed adenocarcinoma. FDG avid nodule in the lingula, satellite lesion versus metastasis. FDG avid nodular opacities in the right middle and right lower lobes; differential includes infectious and metastatic disease. FDG avid pleural thickening in the left lower lung, suspicious for metastasis. Multiple FDG avid mediastinal, and bilateral hilar lymph nodes compatible with metastases. FDG avid bilateral supraclavicular lymph nodes suspicious for additional metastatic lesions. The left supraclavicular lymph node is amenable to ultrasound-guided FNA biopsy as indicated. Scattered mildly FDG avid osseous foci in the axial skeleton and right proximal femur, concerning for metastases. FDG avid low-attenuation lesion within the enlarged left thyroid lobe. Differential includes benign and malignant etiologies. This may be further evaluated with ultrasound and possible FNA biopsy. Nonspecific diffuse gastric hypermetabolism without CT correlate. Please correlate clinically for gastritis. \par \par In terms of her symptoms, both patient and  endorsed that her symptoms of dyspnea did improve after the thoracentesis. Currently she feels well. She does have an decreased appetite and ongoing GI issues which have improved with PPI. \par \par 11/11/22: Pt here for follow up. She has no cough but she does have increased nausea and dizziness. She saw Dr. Street for symtom management. Dizziness mainly precedes dizziness preceding her bowel movements. Has some pain in her lower abdomen. Has 2-3 BMs per day, soft in consistency. She also notes increased bloating and fatigue. Appetite seems to be a little bettr and she has gained some weight. She admits to feeling depressed most of the time but she is not suicidal. \par \par 12/16/22: No issues today, Patient reports she feels better than her prior visit. She states she does not feel short of breath on exertion. No fevers, chills or cough. Patient reports some nausea with her osimertinib, otherwise no vomiting or diarrhea. She has persistent fatigue mid day. Rash on LE and mild ankle swelling\par \par 1/25/23: Overall improved with regards to all symptoms. No AEs from osi\par \par 2/22/23: Patient seen today for follow up. She continues on Tagrisso 80mg daily. She reports ongoing bilateral lower extremity swelling that she feels is from prednisone. Rash has improved. She also is leaning towards her right side because she has an issue with her toe and is going to physical therapy for this. \par \par 3/24/23: pt has LE swelling and rash over it. Pt has been taking prednisone 2.5 mg, which was started by her PCP for joint pains. \par \par 5/23/23: Follow up. Continues on Tagrisso 80mg qd wtihout any AEs. Awaiting results of CT scans. Has some bitter taste in her mouth and some red spots on her legs. [de-identified] : adeno ca [FreeTextEntry1] : Tagrisso 80mg daily started 9/23/2022. \par Xgeva monthly

## 2023-05-23 NOTE — REVIEW OF SYSTEMS
[Fatigue] : fatigue [Lower Ext Edema] : lower extremity edema [Skin Rash] : skin rash [Dizziness] : dizziness [Anxiety] : anxiety [Depression] : depression [Negative] : Allergic/Immunologic [Cough] : no cough [SOB on Exertion] : no shortness of breath during exertion [FreeTextEntry5] : improved [FreeTextEntry7] : GERD-like symptoms resolved [de-identified] : improved [de-identified] : improved [de-identified] : affect better

## 2023-05-23 NOTE — PHYSICAL EXAM
[Restricted in physically strenuous activity but ambulatory and able to carry out work of a light or sedentary nature] : Status 1- Restricted in physically strenuous activity but ambulatory and able to carry out work of a light or sedentary nature, e.g., light house work, office work [Thin] : thin [Normal] : affect appropriate [de-identified] : Firm 2cm subcutaneous lesion in the skin fold under right breast [de-identified] : acneiform rash on skin, and mild LE edema

## 2023-05-24 LAB
ALBUMIN SERPL ELPH-MCNC: 4.2 G/DL
ALP BLD-CCNC: 56 U/L
ALT SERPL-CCNC: 22 U/L
ANION GAP SERPL CALC-SCNC: 10 MMOL/L
AST SERPL-CCNC: 18 U/L
BILIRUB SERPL-MCNC: 0.2 MG/DL
BUN SERPL-MCNC: 33 MG/DL
CALCIUM SERPL-MCNC: 9.2 MG/DL
CEA SERPL-MCNC: 32.3 NG/ML
CHLORIDE SERPL-SCNC: 110 MMOL/L
CO2 SERPL-SCNC: 24 MMOL/L
CREAT SERPL-MCNC: 1.28 MG/DL
EGFR: 42 ML/MIN/1.73M2
GLUCOSE SERPL-MCNC: 81 MG/DL
POTASSIUM SERPL-SCNC: 4.7 MMOL/L
PROT SERPL-MCNC: 7.1 G/DL
SODIUM SERPL-SCNC: 144 MMOL/L
TSH SERPL-ACNC: 1.24 UIU/ML

## 2023-06-05 ENCOUNTER — APPOINTMENT (OUTPATIENT)
Dept: PULMONOLOGY | Facility: CLINIC | Age: 83
End: 2023-06-05
Payer: MEDICARE

## 2023-06-05 VITALS
WEIGHT: 101 LBS | BODY MASS INDEX: 19.83 KG/M2 | RESPIRATION RATE: 15 BRPM | SYSTOLIC BLOOD PRESSURE: 145 MMHG | DIASTOLIC BLOOD PRESSURE: 77 MMHG | HEART RATE: 82 BPM | OXYGEN SATURATION: 98 % | TEMPERATURE: 98.5 F | HEIGHT: 60 IN

## 2023-06-05 DIAGNOSIS — J30.9 ALLERGIC RHINITIS, UNSPECIFIED: ICD-10-CM

## 2023-06-05 PROCEDURE — 76604 US EXAM CHEST: CPT

## 2023-06-05 PROCEDURE — 99214 OFFICE O/P EST MOD 30 MIN: CPT | Mod: 25

## 2023-06-05 NOTE — ASSESSMENT
[FreeTextEntry1] : 83YO Female PMH history of Stage IV Non-small cell lung adenocarcinoma of the LLL with mets to right lung, left pleura, bone (thoracic spine & R proximal femur) On tagrisso, malignant left pleural effusion s/p thoracentesis (9/1/22), PE due to malignancy on apixaban, RA on Enbrel and hydroxychloroquine, HTN, HLD, osteoporosis, s/p COVID (in May 2022). She had recent EBUS/TBNA and thoracentesis on 9/1/22. Pathology with non-small cell lung adenocarcinoma, Stage IV with malignant pleural effusion.\par \par #Left sided malignant effusion\par - Pt continues to denies SOB and reports her breathing is at her baseline\par - Small residual left sided simple appearing pleural effusion on POCUS does not appear to be increasing in size\par - Will continue to monitor her for worsening symptoms \par - Advised to call sooner if any worsening symptoms noted\par - If worsening symptoms noted, we will plan for therapeutic pleural intervention\par - We discussed the risks and benefits of both interventions as well as all alternative interventions. \par \par #VTE in the setting of malignancy\par - Continue antoagulation\par \par # Allergic rhinitis \par - Start anti-histaminic given post nasal drip\par - Also recommended flonase, patient wants to not use at this time\par \par RTC 5-6 months, sooner if needed. \par \par Axel Dos Santos MD\par Director of Interventional Pulmonology & Bronchoscopy\par . \par Division of Pulmonary, Critical Care & Sleep Medicine\par [facility].\par \par

## 2023-06-05 NOTE — REVIEW OF SYSTEMS
[Fever] : no fever [Cough] : cough [Sputum] : no sputum [Dyspnea] : no dyspnea [Chest Discomfort] : no chest discomfort

## 2023-06-05 NOTE — PROCEDURE
[FreeTextEntry1] : Thoracic/Chest Ultrasound\par Indication: Pleural Effusion\par :  Levon\par \par Findings: \par Small left effusion\par No right sided effusion\par \par Interpretation:\par Small Left sided simple appearing pleural effusion\par \par Images uploaded to path.

## 2023-06-06 ENCOUNTER — APPOINTMENT (OUTPATIENT)
Dept: MRI IMAGING | Facility: CLINIC | Age: 83
End: 2023-06-06
Payer: MEDICARE

## 2023-06-06 ENCOUNTER — OUTPATIENT (OUTPATIENT)
Dept: OUTPATIENT SERVICES | Facility: HOSPITAL | Age: 83
LOS: 1 days | End: 2023-06-06
Payer: MEDICARE

## 2023-06-06 DIAGNOSIS — Z00.8 ENCOUNTER FOR OTHER GENERAL EXAMINATION: ICD-10-CM

## 2023-06-06 DIAGNOSIS — Z90.89 ACQUIRED ABSENCE OF OTHER ORGANS: Chronic | ICD-10-CM

## 2023-06-06 DIAGNOSIS — C34.92 MALIGNANT NEOPLASM OF UNSPECIFIED PART OF LEFT BRONCHUS OR LUNG: ICD-10-CM

## 2023-06-06 DIAGNOSIS — Z98.49 CATARACT EXTRACTION STATUS, UNSPECIFIED EYE: Chronic | ICD-10-CM

## 2023-06-06 PROCEDURE — 70553 MRI BRAIN STEM W/O & W/DYE: CPT | Mod: 26

## 2023-06-06 PROCEDURE — A9585: CPT

## 2023-06-06 PROCEDURE — 70553 MRI BRAIN STEM W/O & W/DYE: CPT

## 2023-06-08 ENCOUNTER — OUTPATIENT (OUTPATIENT)
Dept: OUTPATIENT SERVICES | Facility: HOSPITAL | Age: 83
LOS: 1 days | Discharge: HOME | End: 2023-06-08

## 2023-06-08 DIAGNOSIS — Z90.89 ACQUIRED ABSENCE OF OTHER ORGANS: Chronic | ICD-10-CM

## 2023-06-08 DIAGNOSIS — C34.90 MALIGNANT NEOPLASM OF UNSPECIFIED PART OF UNSPECIFIED BRONCHUS OR LUNG: ICD-10-CM

## 2023-06-08 DIAGNOSIS — Z98.49 CATARACT EXTRACTION STATUS, UNSPECIFIED EYE: Chronic | ICD-10-CM

## 2023-06-12 ENCOUNTER — RX RENEWAL (OUTPATIENT)
Age: 83
End: 2023-06-12

## 2023-06-20 ENCOUNTER — APPOINTMENT (OUTPATIENT)
Dept: INFUSION THERAPY | Facility: HOSPITAL | Age: 83
End: 2023-06-20

## 2023-06-20 DIAGNOSIS — C79.51 SECONDARY MALIGNANT NEOPLASM OF BONE: ICD-10-CM

## 2023-07-20 NOTE — PATIENT PROFILE ADULT - NSPROSPHOSPCHAPLAINYN_GEN_A_NUR
1 [id number] 06/20/2023 06/20/2023 Mixed Amphetamine Salt (Capsule, Extended Release) 30.0 30 7.5 MG / 7.5 MG / 7.5 MG / 7.5 MG NA [id number] Commercial Insurance 0 / 0 [place]    1 [id number] 06/20/2023 06/20/2023 Amphetamine Salt Combo (Tablet) 30.0 30 20 MG NA [id number] Commercial Insurance 0 / 0 [place]    1 [id number] 05/18/2023 05/16/2023 Mixed Amphetamine Salt (Capsule, Extended Release) 30.0 30 7.5 MG / 7.5 MG / 7.5 MG / 7.5 MG NA [address] no

## 2023-07-25 NOTE — HISTORY OF PRESENT ILLNESS
[Disease: _____________________] : Disease: [unfilled] [T: ___] : T[unfilled] [N: ___] : N[unfilled] [M: ___] : M[unfilled] [AJCC Stage: ____] : AJCC Stage: [unfilled] [de-identified] : 82 year-old female with med hx of RA (Enbrel and hydroxycholorquine), osteoporosis (on prolia), dyslipidemia, started experiencing GI issues since COVID infection in May 2022. Went to PCP who referred her to ER for rt UQ pain- with concern of GB issues. CT scan at that time showed suspicious findings in the lung. Additional work up as below: \par \par CT chest A/P: LLL mass with mets to right lung, left pleura, bone (thoracic spine & R proximal femur), malignant left pleural effusion s/p thoracentesis (9/1/22), PE was also noted. She had recent EBUS/TBNA and thoracentesis on 9/1/22. Pathology with non-small cell lung adenocarcinoma, stage IV with malignant pleural effusion, lung primary\par \par PET/CT (Sept 2022) with FDG avid mass in the left lower lung likely corresponding to the newly diagnosed adenocarcinoma. FDG avid nodule in the lingula, satellite lesion versus metastasis. FDG avid nodular opacities in the right middle and right lower lobes; differential includes infectious and metastatic disease. FDG avid pleural thickening in the left lower lung, suspicious for metastasis. Multiple FDG avid mediastinal, and bilateral hilar lymph nodes compatible with metastases. FDG avid bilateral supraclavicular lymph nodes suspicious for additional metastatic lesions. The left supraclavicular lymph node is amenable to ultrasound-guided FNA biopsy as indicated. Scattered mildly FDG avid osseous foci in the axial skeleton and right proximal femur, concerning for metastases. FDG avid low-attenuation lesion within the enlarged left thyroid lobe. Differential includes benign and malignant etiologies. This may be further evaluated with ultrasound and possible FNA biopsy. Nonspecific diffuse gastric hypermetabolism without CT correlate. Please correlate clinically for gastritis. \par \par In terms of her symptoms, both patient and  endorsed that her symptoms of dyspnea did improve after the thoracentesis. Currently she feels well. She does have an decreased appetite and ongoing GI issues which have improved with PPI. \par \par 11/11/22: Pt here for follow up. She has no cough but she does have increased nausea and dizziness. She saw Dr. Strete for symtom management. Dizziness mainly precedes dizziness preceding her bowel movements. Has some pain in her lower abdomen. Has 2-3 BMs per day, soft in consistency. She also notes increased bloating and fatigue. Appetite seems to be a little bettr and she has gained some weight. She admits to feeling depressed most of the time but she is not suicidal. \par \par 12/16/22: No issues today, Patient reports she feels better than her prior visit. She states she does not feel short of breath on exertion. No fevers, chills or cough. Patient reports some nausea with her osimertinib, otherwise no vomiting or diarrhea. She has persistent fatigue mid day. Rash on LE and mild ankle swelling\par \par 1/25/23: Overall improved with regards to all symptoms. No AEs from osi\par \par 2/22/23: Patient seen today for follow up. She continues on Tagrisso 80mg daily. She reports ongoing bilateral lower extremity swelling that she feels is from prednisone. Rash has improved. She also is leaning towards her right side because she has an issue with her toe and is going to physical therapy for this. \par \par 3/24/23: pt has LE swelling and rash over it. Pt has been taking prednisone 2.5 mg, which was started by her PCP for joint pains. \par \par 5/23/23: Follow up. Continues on Tagrisso 80mg qd wtihout any AEs. Awaiting results of CT scans. Has some bitter taste in her mouth and some red spots on her legs.\par \par 7/25/23: Taking Tagrisso with minimal AEs. Most signficant side effect is dysgeusia and anorexia. She is taking mirtazipine, which has helped with appetite but not taste [de-identified] : adeno ca [Treatment Protocol] : Treatment Protocol [FreeTextEntry1] : Tagrisso 80mg daily started 9/23/2022. \par Xgeva monthly

## 2023-07-25 NOTE — REVIEW OF SYSTEMS
[Fatigue] : fatigue [Lower Ext Edema] : lower extremity edema [Cough] : no cough [SOB on Exertion] : no shortness of breath during exertion [Skin Rash] : skin rash [Dizziness] : dizziness [Anxiety] : anxiety [Depression] : depression [Negative] : Allergic/Immunologic [FreeTextEntry4] : as above [FreeTextEntry5] : improved [FreeTextEntry7] : GERD-like symptoms resolved [de-identified] : improved [de-identified] : improved [de-identified] : affect better

## 2023-07-25 NOTE — ASSESSMENT
[FreeTextEntry1] : 81 yo w with some comorbidities, including RA, with stage IV diagnosed lung adeno ca\par Baseline PET/CT showed FDG avid mass in the left lower lung likely corresponding to the newly diagnosed adenocarcinoma with multiple areas suspicious for metastasis including a nodule in the lingula, nodular opacities in the right middle and right lower lobes,  pleural thickening in the left lower lung, multiple FDG avid mediastinal and bilateral hilar lymph nodes, bilateral supraclavicular lymph nodes, scattered mildly FDG avid osseous foci in the axial skeleton and right proximal femur, and FDG avid low-attenuation lesion within the enlarged left thyroid lobe.\par Brain MRI showed no mets\par PDL1 40% and NGS showed multiple muts, importantly, EGFR exon 18 and exon 20 muts \par We discussed at depth dx of lung adeno ca, tumor genetics and palliative intent of tx\par Pt started on osimertinib 80 mg daily on 9/23/22\par She is tolerating this well and most of her symptoms have resolved\par Scans from December 2022 noted significant improvement in disease burden in LAD. Healed bony mets were noted. PET/CT February 2023 showed decreased size and metabolism if the left lung mass, and reduced/resolved supraclavicular, mediastinal, lingular, perihilar lymph nodes, and increasing sclerosis of osseous lesions. \par Brain MRI at baseline showed no mets\par CEA is stable in the 30s\par Continue Tagrisso 80mg daily \par Continue Xgeva for bone mets \par Discussed administration, potential AEs, importance of close monitoring with labs, toxicity and efficacy assessment, and EKG while on this high risk medication\par LE swelling likely sec to multifactorial- including perhaps prednisone, which pt was started by Dr. Rsoario for LE swelling. Pt does not this it is helping.  \par Clindamycin 1% gel up to TID for lower extremity acneiform lesions that may be related to Tagrisso\par We discussed the management of symptoms, including dysgeusia, and anorexia. \par MRI brain KOREY\par CT scans in May, independently reviewed- KOREY\par Labs today: CBC, CMP, CEA, TSH\par Continue Xgeva for bone mets\par OV in 3 months\par

## 2023-07-25 NOTE — PHYSICAL EXAM
[Restricted in physically strenuous activity but ambulatory and able to carry out work of a light or sedentary nature] : Status 1- Restricted in physically strenuous activity but ambulatory and able to carry out work of a light or sedentary nature, e.g., light house work, office work [Thin] : thin [Normal] : affect appropriate [de-identified] : acneiform rash on skin, and mild LE edema

## 2023-09-02 PROBLEM — R52 BURNING PAIN: Status: ACTIVE | Noted: 2023-03-16

## 2023-09-02 PROBLEM — G47.09 SECONDARY INSOMNIA: Status: ACTIVE | Noted: 2022-09-16

## 2023-09-02 PROBLEM — M81.0 OSTEOPOROSIS: Status: ACTIVE | Noted: 2022-09-06

## 2023-09-02 PROBLEM — L70.8 ACNEIFORM RASH: Status: ACTIVE | Noted: 2022-11-22

## 2023-09-02 NOTE — HISTORY OF PRESENT ILLNESS
[No falls in past year] : Patient reported no falls in the past year [Completely Independent] : Completely independent. [0] : 2) Feeling down, depressed, or hopeless: Not at all (0) [PHQ-2 Negative - No further assessment needed] : PHQ-2 Negative - No further assessment needed [FreeTextEntry1] : 81 yo female with lung ca receiving treatment by Dr Persaud. Pt on Enbrel and hydroxychlorquine for RA, osteoporosis (prolia), dyslipidemia and recent history of sever pruritis only managed by low dose prednisone.   Pt with significant symptoms related to treatments and illnes.  Pt pruritis controlled with prednisone 2.5 mg a day.  Have tried to decrease prednisone, but pruritis returns. Unclear cause of pruritis. Pt also has had an acneiform rash on legs that uses antibiotic cream which helps. But now main complaint is bitter taste, Cant eat. Has lost weight. Pt also fasted for Ramadan, Food bitter, no appetite and weight is down to 94 pounds.   Pt also complaining of fatigue.  Most recently, Pt has vomited over the past 2 weeks... it is intermittent vomiting. VOmitous is yellow liquid, not food. Always in the morning. Urine is very yellow.  Pt has fatigue Pt also complaining of feeling of ice shooting in her legs. THis happens almost every day.  Pt has RA , no active pain.    [[id number]] : 0 [Time Spent: ___ minutes] : Time Spent: [unfilled] minutes [FreeTextEntry4] : Met with pt and . Discussed pt's condition and how she is feeling and losing weight and  possiblity of next phases. Goals of care discussion held and MOLST orders reviewed. Pt did not seem to follow me perhaps a language comprehension challenge. Pt is full code, continue with therapy. Dharmesh Persaud and son Richardson.

## 2023-09-02 NOTE — HISTORY OF PRESENT ILLNESS
[No falls in past year] : Patient reported no falls in the past year [Completely Independent] : Completely independent. [0] : 2) Feeling down, depressed, or hopeless: Not at all (0) [PHQ-2 Negative - No further assessment needed] : PHQ-2 Negative - No further assessment needed [FreeTextEntry1] : 83 yo female with lung ca receiving treatment by Dr Persaud. Pt on Enbrel and hydroxychlorquine for RA, osteoporosis (prolia), dyslipidemia and recent history of sever pruritis only managed by low dose prednisone.   Pt with significant symptoms related to treatments and illnes.  Pt pruritis controlled with prednisone 2.5 mg a day.  Have tried to decrease prednisone, but pruritis returns. Unclear cause of pruritis. Pt also has had an acneiform rash on legs that uses antibiotic cream which helps. But now main complaint is bitter taste, Cant eat. Has lost weight. Pt also fasted for Ramadan, Food bitter, no appetite and weight is down to 94 pounds.   Pt also complaining of fatigue.  Most recently, Pt has vomited over the past 2 weeks... it is intermittent vomiting. VOmitous is yellow liquid, not food. Always in the morning. Urine is very yellow.  Pt has fatigue Pt also complaining of feeling of ice shooting in her legs. THis happens almost every day.  Pt has RA , no active pain.    [[id number]] : 0 [Time Spent: ___ minutes] : Time Spent: [unfilled] minutes [FreeTextEntry4] : Met with pt and . Discussed pt's condition and how she is feeling and losing weight and  possiblity of next phases. Goals of care discussion held and MOLST orders reviewed. Pt did not seem to follow me perhaps a language comprehension challenge. Pt is full code, continue with therapy. Dharmesh Persaud and son Richardson.

## 2023-09-02 NOTE — ASSESSMENT
[FreeTextEntry1] : dr decker - onocolgist dr landry - rheumatologist, ask about prednisone Dr. Sherman - pulmonologist  frail osteoporosis pruritis RA insomnia

## 2023-09-02 NOTE — PHYSICAL EXAM
[Alert] : alert [No Acute Distress] : in no acute distress [Normal] : the sclera and conjunctiva were normal, extraocular movements were intact, pupils were equal in size, round, and reactive to light [Normal Outer Ear/Nose] : the ears and nose were normal in appearance [Normal Appearance] : the appearance of the neck was normal [Supple] : the neck was supple [No Respiratory Distress] : no respiratory distress [No Acc Muscle Use] : no accessory muscle use [Auscultation Breath Sounds / Voice Sounds] : lungs were clear to auscultation bilaterally [Respiration, Rhythm And Depth] : normal respiratory rhythm and effort [Heart Rate And Rhythm] : heart rate was normal and rhythm regular [Bowel Sounds] : normal bowel sounds [Abdomen Soft] : soft [Abdomen Tenderness] : non-tender [No Spinal Tenderness] : no spinal tenderness [Normal Gait] : normal gait [Normal Turgor] : normal skin turgor [Normal Color / Pigmentation] : normal skin color and pigmentation [No Focal Deficits] : no focal deficits [Normal Affect] : the affect was normal [Normal Mood] : the mood was normal [de-identified] : thin, frail [de-identified] : thin, frail

## 2023-09-02 NOTE — REVIEW OF SYSTEMS
[Feeling Poorly] : feeling poorly [Feeling Tired] : feeling tired [As Noted in HPI] : as noted in HPI [Vomiting] : vomiting [Negative] : Heme/Lymph

## 2023-09-02 NOTE — GOALS
[Patient] : patient [Time Spent: ___ minutes] : I, personally, spent [unfilled] minutes on advance care planning services with the patient.  This time is separate and distinct from any other care management services provided on this date [FreeTextEntry7] :  Gustavo Chen present. HCP is Feroz.  Pt able to make decisions .  Discussed her serious illness and that currently she is receiving therapy and if anything happens she will be hospitalized and use all medical resources even ICU or intubation and resuscition. I explained she has lost weight and is frail  I asked if she does not want this path or feels it is too difficult for her and wants us to stop treatment or hold it she can let us know.  The pt and the  seemed to understand what I said, but said they want to continue the current plan.   We can consider zofran for nausea and gabapentin for leg pain.

## 2023-09-02 NOTE — PHYSICAL EXAM
[Alert] : alert [No Acute Distress] : in no acute distress [Normal] : the sclera and conjunctiva were normal, extraocular movements were intact, pupils were equal in size, round, and reactive to light [Normal Outer Ear/Nose] : the ears and nose were normal in appearance [Normal Appearance] : the appearance of the neck was normal [Supple] : the neck was supple [No Respiratory Distress] : no respiratory distress [No Acc Muscle Use] : no accessory muscle use [Auscultation Breath Sounds / Voice Sounds] : lungs were clear to auscultation bilaterally [Respiration, Rhythm And Depth] : normal respiratory rhythm and effort [Heart Rate And Rhythm] : heart rate was normal and rhythm regular [Bowel Sounds] : normal bowel sounds [Abdomen Tenderness] : non-tender [Abdomen Soft] : soft [No Spinal Tenderness] : no spinal tenderness [Normal Gait] : normal gait [Normal Color / Pigmentation] : normal skin color and pigmentation [Normal Turgor] : normal skin turgor [No Focal Deficits] : no focal deficits [Normal Affect] : the affect was normal [Normal Mood] : the mood was normal [de-identified] : thin, frail [de-identified] : thin, frail

## 2023-09-26 PROBLEM — E78.5 HLD (HYPERLIPIDEMIA): Status: ACTIVE | Noted: 2022-10-21

## 2023-09-26 PROBLEM — L65.9 ALOPECIA: Status: ACTIVE | Noted: 2023-03-21

## 2023-10-05 NOTE — ED ADULT NURSE NOTE - OBJECTIVE STATEMENT
Patient brought to ER by EMS from home for IV hydration. As per family patient states that she has had diarrhea and was told by Oncologist that itis a side effect of the medication that she is taking s/p lung replacement(lung tumor). Patient has family at bedside and is alert and oriented times four. Patient evaluated by MD, 20 gauge IV lock placed and labs drawn and sent. Normal Saline infusing as ordered. Patient waiting for results, CT Scan and disposition.  ENEDINA Novoa

## 2023-10-05 NOTE — CONSULT NOTE ADULT - ASSESSMENT
82F w PMHx HTN, RA, HLD, recently dx STG IV Lung adenocarcinoma on Tagrisso chemotherapy, presents to [facility] ED with diarrhea, nausea, and vomiting. CT A/P obtained demonstrating dilated fluid filled loops of small and large bowel, w/o discrete TP, cf enterocolitis. Surgery consulted for r/o SBO.     Patient clinically not obstructed. CT A/P w dilated SB and LB but no discrete TP. Overall low concern for SBO. Sxs likely 2/2 Tagrisso.     Plan:   -No acute surgical intervention   -No need for repeat CT w PO contrast   -PO Challenge  -Dispo per ED     Discussed with Attending Surgeon Dr. Lety MD PGY3  B Team, [id number]

## 2023-10-05 NOTE — ED PROVIDER NOTE - OBJECTIVE STATEMENT
81yo female pt PMHx lung CA on PO chemo, RA, HTN, HLD who presents to the ED for 2 weeks of persistent diarrhea, intermittent n/v, poor po intake, and lightheadedness. Patient believes symptoms are 2/2 PO chemo however imodium has not helped patient's symptoms. 83 yo female pt PMHx lung CA on PO chemo, RA, HTN, HLD who presents to the ED for 2 weeks of persistent diarrhea, intermittent n/v, poor po intake, and lightheadedness. Patient believes symptoms are 2/2 PO chemo however imodium has not helped patient's symptoms.

## 2023-10-05 NOTE — ED PROVIDER NOTE - NSICDXPASTMEDICALHX_GEN_ALL_CORE_FT
PAST MEDICAL HISTORY:  HTN (hypertension)     Hyperlipidemia     Rheumatoid arthritis      PAST MEDICAL HISTORY:  HTN (hypertension)     Hyperlipidemia     Rheumatoid arthritis     Stage 4 lung cancer

## 2023-10-05 NOTE — ED ADULT NURSE NOTE - NSFALLHARMRISKINTERV_ED_ALL_ED

## 2023-10-05 NOTE — ED PROVIDER NOTE - NS ED MD DISPO ISOLATION TYPES
Clinical Pharmacy - Warfarin Dosing Consult     Pharmacy has been consulted to manage this patient s warfarin therapy.  Indication: DVT/ PE Treatment  Therapy Goal: INR 2-3  Warfarin Prior to Admission: No  Significant drug interactions: PTA sertraline, ibuprofen    INR   Date Value Ref Range Status   12/11/2019 1.03 0.86 - 1.14 Final       Recommend warfarin 5 mg today.  Pharmacy will monitor Mesha Fields daily and order warfarin doses to achieve specified goal.      Please contact pharmacy as soon as possible if the warfarin needs to be held for a procedure or if the warfarin goals change.      Nohemi Hackett, Student Pharmacist   Contact

## 2023-10-05 NOTE — ED PROVIDER NOTE - CLINICAL SUMMARY MEDICAL DECISION MAKING FREE TEXT BOX
82y female patient who was recently found with lung ca and is currently on PO chemo and presents to the ED for persistent diarrhea, n/v, poor appetite, and lightheadedness. Sent in by oncology for eval and hydration. On exam found cachectic and with dry mucous membranes. Will eval with blood work, imaging and possibly admit for failure to thrive.

## 2023-10-05 NOTE — ED PROVIDER NOTE - CARE PLAN
1 Principal Discharge DX:	Diarrhea   Principal Discharge DX:	FTT (failure to thrive) in adult  Secondary Diagnosis:	Diarrhea

## 2023-10-05 NOTE — ED ADULT TRIAGE NOTE - CHIEF COMPLAINT QUOTE
Pt c/o diarrhea x 2 weeks. Sent to ED by MD for IV hydration. Endorsing intermittent N/V. Denies abdominal pain, recent antibiotic use, blood in the stool. PHx A. Fib, HTN, HLD

## 2023-10-05 NOTE — ED ADULT NURSE REASSESSMENT NOTE - NS ED NURSE REASSESS COMMENT FT1
report received ENEDINA Victor. pt remains at baseline mental status, RR even unlabored completing full sentences. pt resting in stretcher comfortably at this time, no new complaints offered. stretcher lowest position siderails up safety measures in place. MD at bedside.

## 2023-10-05 NOTE — ED PROVIDER NOTE - PATIENT PORTAL LINK FT
You can access the FollowMyHealth Patient Portal offered by [facility] by registering at the following website: [email or web address]. By joining [facility]’s FollowMyHealth portal, you will also be able to view your health information using other applications (apps) compatible with our system.

## 2023-10-05 NOTE — ED PROVIDER NOTE - CONSIDERATION OF ADMISSION OBSERVATION
pt with failure to thrive in the setting of malignancy; will require admission for further evaluation Consideration of Admission/Observation

## 2023-10-05 NOTE — ED PROVIDER NOTE - NSFOLLOWUPINSTRUCTIONS_ED_ALL_ED_FT
Diarrhea    Diarrhea is frequent loose or watery bowel movements that has many causes. Diarrhea can make you feel weak and cause you to become dehydrated. Diarrhea typically lasts 2–3 days, but can last longer if it is a sign of something more serious. Drink clear fluids to prevent dehydration. Eat bland, easy-to-digest foods as tolerated.     Your diarrhea is likely caused by chemo side effect. Please follow up with your oncologist or PCP as soon as possible.     SEEK IMMEDIATE MEDICAL CARE IF YOU HAVE ANY OF THE FOLLOWING SYMPTOMS: high fevers, lightheadedness/dizziness, chest pain, black or bloody stools, shortness of breath, severe abdominal or back pain, or any signs of dehydration.

## 2023-10-05 NOTE — ED PROVIDER NOTE - ATTENDING CONTRIBUTION TO CARE
Dr. Barragan: This is an 82-year-old female with rheumatoid arthritis, hypertension, hyperlipidemia, lung cancer on oral chemotherapy, in the emergency department with 2 to 3 weeks of intermittent episodes of nonbloody diarrhea.  She also endorses few episodes of nausea and vomiting, and generally decreased oral intake.  She denies fever, chest pain, shortness of breath.  On exam pt chronically-ill appearing, with dry mucous membranes, but in NAD, heart RRR, lungs CTAB, abd NTND, extremities without swelling, strength 5/5 in all extremities and skin without rash. Dr. Barragan: This is an 82-year-old female with rheumatoid arthritis, hypertension, hyperlipidemia, lung cancer on oral chemotherapy, in the emergency department with 2 to 3 weeks of intermittent episodes of nonbloody diarrhea.  She also endorses few episodes of nausea and vomiting, and generally decreased oral intake.  She denies fever, chest pain, shortness of breath.  On exam pt chronically-ill appearing, with dry mucous membranes, but in NAD, heart RRR, lungs CTAB, abd NTND, extremities without swelling, strength 5/5 in all extremities and skin without rash.  No recent antibiotics or hospitalizations.  Pt lives with her , who is not sick.

## 2023-10-05 NOTE — ED PROVIDER NOTE - PHYSICAL EXAMINATION
GENERAL: Awake, alert, cachectic  HEENT: NC/AT, dry mucous membranes, EOMI  LUNGS: CTAB, no wheezes or crackles   CARDIAC: RRR, no m/r/g  ABDOMEN: Soft, non tender, non distended, no rebound, no guarding  BACK: No midline spinal tenderness   EXT: No edema, no calf tenderness, no deformities.  NEURO: A&Ox3. Moving all extremities.  SKIN: Warm and dry. No rash.  PSYCH: Normal affect.

## 2023-10-06 NOTE — H&P ADULT - PROBLEM SELECTOR PLAN 7
- DVT PPx: c/w eliquis  - Diet: Regular, Nutrition consulted  - PT: pending  - Dispo: pending    Full Code.

## 2023-10-06 NOTE — CONSULT NOTE ADULT - PROBLEM SELECTOR RECOMMENDATION 6
Case reviewed with primary team   Updated by outpatient geriatric team regarding patient's case via email; outpatient geriatric notes reviewed     Thank you for allowing us to participate in your patient's care. Please page [phone] for any questions/concerns.

## 2023-10-06 NOTE — H&P ADULT - PROBLEM SELECTOR PLAN 4
- C/w home hydroxychloroquine  - Of note, pt was on prednisone 2.5 mg every other day for years, was stopped ~1 week prior to onset of diarrhea

## 2023-10-06 NOTE — CONSULT NOTE ADULT - PROBLEM SELECTOR RECOMMENDATION 3
- No nausea during encounter, but she states she has intermittent nausea/ emesis recently  - If nausea returns, can consider IV Zofran 4mg q6-8 PRN if QTC wnl

## 2023-10-06 NOTE — CONSULT NOTE ADULT - ASSESSMENT
82F with HTN, RA, HLD, stage IV lung adenocarcinoma on Tagrisso, who presents with 2 weeks of diarrhea, and nausea/vomiting.   Palliative Care consulted for complex decision making  related to goals of care discussions in the setting of advanced illness

## 2023-10-06 NOTE — H&P ADULT - NSHPLABSRESULTS_GEN_ALL_CORE
LABS:                          9.7    2.71  )-----------( 134      ( 05 Oct 2023 15:00 )             30.2     10-05    138  |  111<H>  |  34<H>  ----------------------------<  96  3.5   |  15<L>  |  1.84<H>    Ca    8.3<L>      05 Oct 2023 15:00  Phos  2.4     10-05  Mg     2.30     10-05    TPro  7.0  /  Alb  3.7  /  TBili  <0.2  /  DBili  x   /  AST  16  /  ALT  15  /  AlkPhos  97  10-05    LIVER FUNCTIONS - ( 05 Oct 2023 15:00 )  Alb: 3.7 g/dL / Pro: 7.0 g/dL / ALK PHOS: 97 U/L / ALT: 15 U/L / AST: 16 U/L / GGT: x                     Urinalysis Basic - ( 05 Oct 2023 15:00 )    Color: x / Appearance: x / SG: x / pH: x  Gluc: 96 mg/dL / Ketone: x  / Bili: x / Urobili: x   Blood: x / Protein: x / Nitrite: x   Leuk Esterase: x / RBC: x / WBC x   Sq Epi: x / Non Sq Epi: x / Bacteria: x          CT Abdomen and Pelvis No Cont (10.05.23 @ 16:59)    IMPRESSION:  Fluid-filled small and large bowel without discrete transition point to   suggest small bowel obstruction. Findings may represent a nonspecific   enterocolitis. If there is clinical concern for bowel obstruction,   consider repeat study with oral contrast.    Other chronic findings as above.

## 2023-10-06 NOTE — H&P ADULT - PROBLEM SELECTOR PLAN 2
Non-gap metabolic acidosis with CARYN, likely starvation & diarrhea component  - BUN/SCr 34/1.84; baseline 1.16 recently  - Bladder scan  - ~1L fluid with IV prior to admission; advance diet  - Avoid nephrotoxic medications, renal dosing

## 2023-10-06 NOTE — ED ADULT NURSE REASSESSMENT NOTE - NS ED NURSE REASSESS COMMENT FT1
pt remains at baseline mental status, RR even unlabored completing full sentences. pt resting in stretcher comfortably at this time, no new complaints offered. maintenance fluids running per order. stretcher lowest position siderails up safety measures in place. report given to inpatient RN

## 2023-10-06 NOTE — H&P ADULT - HISTORY OF PRESENT ILLNESS
82F w PMHx HTN, RA, HLD, recently dx STG IV Lung adenocarcinoma on Tagrisso chemotherapy, presents to [facility] ED with diarrhea, nausea, and vomiting.      ED Course:  - VS: Afebrile, HR 60-80s, SBP 99-130s, 100% RA  - 1L NS + 100cc/hr LR started + NaHCO3 1300mg x1  - CT A/P obtained demonstrating dilated fluid filled loops of small and large bowel, w/o discrete TP, cf enterocolitis  - Surgery evaluation completed, no acute surgical intervention at this time 82F PMH HTN, RA, HLD, recently dx STG IV Lung adenocarcinoma on Tagrisso chemotherapy, presents to [facility] ED with diarrhea x2-3 weeks (nonbloody, yellow/mucous, watery) with some intermittent nausea/vomiting. Pt called her oncologist re her sx's was advised to take immodium as may be a side effect of her Tagrisso - but pt hasn't been taking. Pt was advised to stop taking her Tagrisso ~1 month ago, has been taking it about ~1 year; pt states she was also on prednisone 2.5mg every other day for a long time, which was stopped around 1 week prior to onset of her diarrhea. Pt states her PO intake has been very little, and was started on mirtazapine to help with appetite. Pt denies any fever/chills, CP/SOB, issues urinating, back pain, travel/new foods, sick contacts at home, or any other sx's at this time.      ED Course:  - VS: Afebrile, HR 60-80s, SBP 99-130s, 100% RA  - 1L NS + 100cc/hr LR started + NaHCO3 1300mg x1  - CT A/P obtained demonstrating dilated fluid filled loops of small and large bowel, w/o discrete TP, cf enterocolitis  - Surgery evaluation completed, no acute surgical intervention at this time

## 2023-10-06 NOTE — CONSULT NOTE ADULT - PROBLEM SELECTOR RECOMMENDATION 2
- Patient with Stage 4 Lung Adenocarcinoma on Tagrisso  - Follows outpatient with Dr. Persaud   - Outpatient oncology notes reviewed   - Pending inpatient oncology consult

## 2023-10-06 NOTE — DIETITIAN INITIAL EVALUATION ADULT - PERTINENT LABORATORY DATA
(10/5) Na 138, BUN 34<H>, Cr 1.84<H>, BG 96, K+ 3.5, Phos 2.4<L>, Mg 2.30, Alk Phos 97, ALT/SGPT 15, AST/SGOT 16

## 2023-10-06 NOTE — DIETITIAN INITIAL EVALUATION ADULT - OTHER CALCULATIONS
Apparent clinically significant ~18 lb (16%) weight loss x1 month, per history obtained via chart: (10/6 dosing) 94.3 lbs / 42.8 kg, (9/23) 109 lbs, (9/6) 112 lbs, (8/26) 112 lbs.   Ideal Body Weight: 105 lbs / 47.7 kg +/-10%

## 2023-10-06 NOTE — DIETITIAN INITIAL EVALUATION ADULT - OTHER INFO
Per chart, pt is 82 year old female PMH HTN, RA, HLD, recently dx stage IV lung adenocarcinoma (on chemo) presenting with diarrhea, nausea, vomiting. CTAP demonstrating dilated fluid filled loops of small and large bowel concerning for enterocolitis. Surgery was consulted, no acute intervention.     Pt's  present at bedside throughout interaction, whom pt lives at home with. Pt confirms NKFA, denies difficulties chewing however endorses occasional difficulties swallowing with sensation of food getting stuck. Pt reports generally poor appetite with progressive decline and inability to tolerate PO intake over the past two weeks secondary to diarrhea, abdominal discomfort. Pt has been relying on the BRAT diet (mainly bread, rice, applesauce) and utilization of imodium however without improvement in symptoms. Pt endorses prior use of Ensure shakes.     Pt continues with poor PO intake, diarrhea. Preference vocalized for dry cereal, potatoes, bread, rice, oatmeal and applesauce. Labs notable for hypophosphatemia.

## 2023-10-06 NOTE — H&P ADULT - PROBLEM SELECTOR PLAN 1
Likely iso recent medication changes (Tagrisso)  - Surgery evaluated, no acute intervention for concern of SBO on CT finding; pt passing gas/BMs  - GI PCR, stool cx, C. diff >> f/u; immodium if negative  - Advance diet as tolerated  - GI evaluation recommended by oncology o/p, may f/u o/p

## 2023-10-06 NOTE — H&P ADULT - PROBLEM SELECTOR PLAN 3
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 Persaud >> emailed, made aware

## 2023-10-06 NOTE — H&P ADULT - NSHPREVIEWOFSYSTEMS_GEN_ALL_CORE
REVIEW OF SYSTEMS:  CONSTITUTIONAL: No weakness, fevers, chills, sick contacts, or unintended weight loss  EYES: No visual changes or vertigo  ENT: No throat pain, rhinorrhea, or hearing loss   NECK: No pain or stiffness  RESPIRATORY: No cough, wheezing, hemoptysis; No shortness of breath  CARDIOVASCULAR: No chest pain or palpitations  GASTROINTESTINAL: No abdominal or epigastric pain. No nausea, vomiting, or hematemesis; No diarrhea or constipation. No melena or hematochezia.  GENITOURINARY: No dysuria, frequency or hematuria  NEUROLOGICAL: No numbness or weakness  SKIN: No itching, rashes, or bruises  Psych: Good mood, no substance use REVIEW OF SYSTEMS:  CONSTITUTIONAL: No weakness, fevers, chills, sick contacts, or unintended weight loss  EYES: No visual changes or vertigo  ENT: No throat pain, rhinorrhea, or hearing loss   NECK: No pain or stiffness  RESPIRATORY: No cough, wheezing, hemoptysis; No shortness of breath  CARDIOVASCULAR: No chest pain or palpitations  GASTROINTESTINAL: No abdominal or epigastric pain. No nausea, vomiting, or hematemesis; No melena or hematochezia. +diarrhea  GENITOURINARY: No dysuria, frequency or hematuria  NEUROLOGICAL: No numbness or weakness  SKIN: No itching, rashes, or bruises  Psych: Good mood, no substance use

## 2023-10-06 NOTE — H&P ADULT - NSHPPHYSICALEXAM_GEN_ALL_CORE
VITALS:   T(C): 36.6 (10-06-23 @ 02:34), Max: 37.5 (10-06-23 @ 00:37)  HR: 76 (10-06-23 @ 02:34) (69 - 81)  BP: 121/67 (10-06-23 @ 02:34) (95/46 - 139/73)  RR: 18 (10-06-23 @ 02:34) (16 - 18)  SpO2: 100% (10-06-23 @ 02:34) (100% - 100%)    GENERAL: NAD, lying in bed comfortably  HEAD:  Atraumatic, Normocephalic  EYES: EOMI, PERRLA, conjunctiva and sclera clear  ENT: Moist mucous membranes  NECK: Supple, No JVD  CHEST/LUNG: Clear to auscultation bilaterally; No rales, rhonchi, wheezing, or rubs. Unlabored respirations  HEART: Regular rate and rhythm; No murmurs, rubs, or gallops  ABDOMEN: BSx4; Soft, nontender, nondistended  EXTREMITIES:  2+ Peripheral Pulses, brisk capillary refill. No clubbing, cyanosis, or edema  NERVOUS SYSTEM:  A&Ox3, no focal deficits   SKIN: No rashes or lesions  Psych: Normal speech, normal behavior, normal affect VITALS:   T(C): 36.6 (10-06-23 @ 02:34), Max: 37.5 (10-06-23 @ 00:37)  HR: 76 (10-06-23 @ 02:34) (69 - 81)  BP: 121/67 (10-06-23 @ 02:34) (95/46 - 139/73)  RR: 18 (10-06-23 @ 02:34) (16 - 18)  SpO2: 100% (10-06-23 @ 02:34) (100% - 100%)    GENERAL: NAD, lying in bed comfortably  HEAD:  Atraumatic, Normocephalic  EYES: EOMI, PERRLA, conjunctiva and sclera clear  ENT: Moist mucous membranes  NECK: Supple, No JVD  CHEST/LUNG: Clear to auscultation bilaterally; No rales, rhonchi, wheezing, or rubs. Unlabored respirations  HEART: Regular rate and rhythm; No murmurs, rubs, or gallops'   ABDOMEN: BSx4; Soft, nontender, nondistended  EXTREMITIES:  2+ Peripheral Pulses, brisk capillary refill. No clubbing, cyanosis; +b/l peripheral edema  NERVOUS SYSTEM:  A&Ox3, no focal deficits   SKIN: No rashes or lesions  Psych: Normal speech, normal behavior, normal affect

## 2023-10-06 NOTE — DIETITIAN NUTRITION RISK NOTIFICATION - ADDITIONAL COMMENTS/DIETITIAN RECOMMENDATIONS
Please see Dietitian Initial Assessment for complete recommendations.  Casie Ray, WALLY, CDN #[id number]  Also available on Microsoft Teams

## 2023-10-06 NOTE — PATIENT PROFILE ADULT - FALL HARM RISK - HARM RISK INTERVENTIONS

## 2023-10-06 NOTE — DIETITIAN INITIAL EVALUATION ADULT - ADD RECOMMEND
- Recommend continue regular diet.  - RDN to provide Orgain Vegan shake 1 PO daily (provides 220 kcal, 16 gm protein per 11oz serving).  - Obtain weekly weights.  - Monitor electrolytes (K+, Mg, P) and replete to within desired limits as clinically indicated.   - Reviewed menu ordering system, obtained food preferences and will honor as able.

## 2023-10-06 NOTE — H&P ADULT - TIME BILLING
Review of laboratory data, radiology results, consultants' recommendations, documentation in [place], discussion with patient/house staff and interdisciplinary staff (such as , social workers, etc). Interventions were performed as documented above.

## 2023-10-06 NOTE — CONSULT NOTE ADULT - PROBLEM SELECTOR RECOMMENDATION 9
- CT Abd / Pelvis 10/5- Fluid-filled small and large bowel without discrete transition point to suggest small bowel obstruction. Findings may represent a nonspecific   enterocolitis.  - Surgery recommendations appreciated  - management as per primary team - CT Abd / Pelvis 10/5- Fluid-filled small and large bowel without discrete transition point to suggest small bowel obstruction. Findings may represent a nonspecific   enterocolitis.  - Surgery recommendations appreciated  - pending rule out for C. Diff  - management as per primary team

## 2023-10-06 NOTE — CONSULT NOTE ADULT - TIME BILLING
Time spent for extensive review of the physical chart, electronic health record, and documentation to obtain collateral information including but not limited to:    - Current inpatient records (ED, H&P, primary team, and consultants if applicable)   - Inpatient values/results (biomarkers, immunoassays, imaging, and microbiology results)   - Current or proposed treatment plans   - Pharmacotherapy review   Time spent discussing and coordinating care with primary team and interdisciplinary staff and floor staff

## 2023-10-06 NOTE — H&P ADULT - ATTENDING COMMENTS
82F with HTN, RA, HLD, stage IV lung adenocarcinoma on Tagrisso, who presents with 2 weeks of diarrhea, and nausea/vomiting. Pt has seen her PMD and oncologist for this, concern is that diarrhea is 2/2 medication side effect. CT A/P with dilated loops of bowel, consistent with enterocolitis    # Enterocolitis- likely 2/2 tragrisso side effect. Oncology consulted, follow up recs. Check GI PCR and C diff, though these were negative outpatient. Appreciate surgery recs, presentation not c/w SBO.   # Stage IV lung adenocarcinoma- oncology consulted. D/w outpatient geriatrician, [facility] ongoing. Palliative consulted.    Plan d/w Son and  at bedside

## 2023-10-06 NOTE — H&P ADULT - ASSESSMENT
82F PMH HTN, RA, HLD, recently dx STG IV Lung adenocarcinoma on Tagrisso chemotherapy, presents to [facility] ED with diarrhea, found to have possible SBO on CT - making BMs/passing gas. Admitted for medication related diarrhea (Tagrisso vs. prednisone d/c'ing) 2/2 enterocolitis, c/b non-gap metabolic acidosis / CARYN.

## 2023-10-07 NOTE — PHYSICAL THERAPY INITIAL EVALUATION ADULT - NSPTDISCHREC_GEN_A_CORE
anticipate outpatient PT to improve functional mobility and endurance/Outpatient PT Pt is at baseline and does not require skilled PT services. Pt will be taken off program, re consult if needed./No skilled PT needs

## 2023-10-07 NOTE — CONSULT NOTE ADULT - ASSESSMENT
82F PMH HTN, RA, HLD, recently dx STG IV Lung adenocarcinoma on Tagrisso chemotherapy, presents to [facility] ED with diarrhea x2-3 weeks (nonbloody, yellow/mucous, watery) with some intermittent nausea/vomiting. Oncology consulted for continuation of care.    # Lung adenoCA  - patient has stage 4 lung adenoCA, on tagrisso  - diarrhea is likely tagrisso related; patient was instructed to take imodium but wasn't taking  - however, would still need to r/o infection by getting GI PCR and C.diff test-> pending  - patient has non-AG metabolic acidosis, hypophosphatemia from diarrhea  -  CT abdomen and pelvis 10/5-> Fluid-filled small and large bowel without discrete transition point to   suggest small bowel obstruction. Findings may represent a nonspecific enterocolitis.   - Can hold tagrisso for now.  - Recommend IVF with correction of electrolytes  - Oncology will follow   82F PMH HTN, RA, HLD, recently dx STG IV Lung adenocarcinoma on Tagrisso chemotherapy, presents to [facility] ED with diarrhea x2-3 weeks (nonbloody, yellow/mucous, watery) with some intermittent nausea/vomiting. Oncology consulted for continuation of care.    # Lung adenoCA  - patient has stage 4 lung adenoCA, on tagrisso  - diarrhea was thought to be tagrisso related; patient was instructed to take imodium but wasn't taking. However, patient has stopped taking tagrisso for 10 days already  - Would need to r/o infection by getting GI PCR and C.diff test-> pending  - patient has non-AG metabolic acidosis, hypophosphatemia from diarrhea  -  CT abdomen and pelvis 10/5-> Fluid-filled small and large bowel without discrete transition point to suggest small bowel obstruction. Findings may represent a nonspecific enterocolitis.   - continue to hold tagrisso for now.  - Recommend IVF with correction of electrolytes  - Oncology will follow

## 2023-10-07 NOTE — PHYSICAL THERAPY INITIAL EVALUATION ADULT - ADDITIONAL COMMENTS
Pt lives with her  in a house with 3 steps to enter. Pt did not use an assistive device and was independent with ADLs prior.   Pt left semi supine in bed in NAD, call bell in reach and ENEDINA Jenkins made aware.

## 2023-10-07 NOTE — PROGRESS NOTE ADULT - PROBLEM SELECTOR PLAN 1
Likely iso recent medication changes (Tagrisso)  - Surgery evaluated, no acute intervention for concern of SBO on CT finding; pt passing gas/BMs  - GI PCR, stool cx, C. diff >> f/u; immodium if negative  - Advance diet as tolerated  - GI evaluation recommended by oncology o/p, may f/u o/p Likely iso recent medication changes (Tagrisso)  - Surgery evaluated, no acute intervention for concern of SBO on CT finding; pt passing gas/BMs  - GI PCR, stool cx, C. diff >> f/u; immodium if negative  - Advance diet as tolerated, pt eating regular diet adequately  - GI evaluation recommended by oncology o/p, may f/u o/p

## 2023-10-07 NOTE — CONSULT NOTE ADULT - ATTENDING COMMENTS
This is an 82 year old woman with a history of hypertension, RA, hyperlipidemia, stage IV lung adenocarcinoma on Tagrisso therapy.  Patient developed watery diarrhea for 2 weeks with nausea and vomiting prior to presenting to the ED.  CT scan demonstrated an enterocolitis.  Patient had already held the Tagrisso for 10 days prior to ER presentation.  The tagrisso does have a low incidence causing this enterocolitis perhaps about 2%, but the history of patient holdign it for 10 days without any relief of symptoms goes against this. Suspect an infectious etiology .Recommend running stool PCR for C diff. This is an 82 year old woman with a history of hypertension, RA, hyperlipidemia, stage IV lung adenocarcinoma on Tagrisso therapy.  Patient developed watery diarrhea for 2 weeks with nausea and vomiting prior to presenting to the ED.  CT scan demonstrated an enterocolitis.  Patient had already held the Tagrisso for 10 days prior to ER presentation.  The tagrisso does have a low incidence causing this enterocolitis perhaps about 2%, but the history of patient holding it for 10 days without any relief of symptoms goes against this. Suspect an infectious etiology.  Recommend running stool PCR for C diff.

## 2023-10-07 NOTE — PHYSICAL THERAPY INITIAL EVALUATION ADULT - PERTINENT HX OF CURRENT PROBLEM, REHAB EVAL
Pt is a 82 year old female with a past medical history of HTN, RA, HLD, recently dx STG IV Lung adenocarcinoma on Tagrisso chemotherapy, presents to [facility] ED with diarrhea x2-3 weeks (nonbloody, yellow/mucous, watery) with some intermittent nausea/vomiting. Pt called her oncologist re her sx's was advised to take immodium as may be a side effect of her Tagrisso - but pt hasn't been taking. Pt was advised to stop taking her Tagrisso ~1 month ago, has been taking it about ~1 year; pt states she was also on prednisone 2.5mg every other day for a long time, which was stopped around 1 week prior to onset of her diarrhea. Pt states her PO intake has been very little, and was started on mirtazapine to help with appetite. Pt denies any fever/chills, CP/SOB, issues urinating, back pain, travel/new foods, sick contacts at home, or any other sx's at this time.

## 2023-10-07 NOTE — CONSULT NOTE ADULT - SUBJECTIVE AND OBJECTIVE BOX
HPI:    82F PMH HTN, RA, HLD, recently dx STG IV Lung adenocarcinoma on Tagrisso chemotherapy, presents to [facility] ED with diarrhea x2-3 weeks (nonbloody, yellow/mucous, watery) with some intermittent nausea/vomiting. Pt called her oncologist re her sx's was advised to take immodium as may be a side effect of her Tagrisso - but pt hasn't been taking. Pt was advised to stop taking her Tagrisso ~1 month ago, has been taking it about ~1 year; pt states she was also on prednisone 2.5mg every other day for a long time, which was stopped around 1 week prior to onset of her diarrhea. Pt states her PO intake has been very little, and was started on mirtazapine to help with appetite. Pt denies any fever/chills, CP/SOB, issues urinating, back pain, travel/new foods, sick contacts at home, or any other sx's at this time.    In the ED,  - VS: Afebrile, HR 60-80s, SBP 99-130s, 100% RA  - 1L NS + 100cc/hr LR started + NaHCO3 1300mg x1  - CT A/P obtained demonstrating dilated fluid filled loops of small and large bowel, w/o discrete TP, cf enterocolitis  - Surgery evaluation completed, no acute surgical intervention at this time      -----      PAST MEDICAL & SURGICAL HISTORY:  HTN (hypertension)      Rheumatoid arthritis      Hyperlipidemia      Stage 4 lung cancer      S/P tonsillectomy      History of cataract surgery          Allergies    No Known Allergies    Intolerances        MEDICATIONS  (STANDING):  apixaban 2.5 milliGRAM(s) Oral two times a day  atorvastatin 20 milliGRAM(s) Oral at bedtime  famotidine    Tablet 20 milliGRAM(s) Oral daily  hydroxychloroquine 200 milliGRAM(s) Oral daily  influenza  Vaccine (HIGH DOSE) 0.7 milliLiter(s) IntraMuscular once  lactated ringers. 500 milliLiter(s) (75 mL/Hr) IV Continuous <Continuous>  lactated ringers. 1000 milliLiter(s) (100 mL/Hr) IV Continuous <Continuous>  mirtazapine 7.5 milliGRAM(s) Oral at bedtime  sodium phosphate 15 milliMole(s)/250 mL IVPB 15 milliMole(s) IV Intermittent once    MEDICATIONS  (PRN):  acetaminophen     Tablet .. 650 milliGRAM(s) Oral every 6 hours PRN Temp greater or equal to 38C (100.4F), Mild Pain (1 - 3)  melatonin 3 milliGRAM(s) Oral at bedtime PRN Insomnia      FAMILY HISTORY:  No pertinent family history in first degree relatives        SOCIAL HISTORY: No EtOH, no tobacco    REVIEW OF SYSTEMS:    CONSTITUTIONAL: No weakness, fevers or chills  EYES/ENT: No visual changes;  No vertigo or throat pain   NECK: No pain or stiffness  RESPIRATORY: No cough, wheezing, hemoptysis; No shortness of breath  CARDIOVASCULAR: No chest pain or palpitations  GASTROINTESTINAL: No abdominal or epigastric pain. No nausea, vomiting, or hematemesis; No diarrhea or constipation. No melena or hematochezia.  GENITOURINARY: No dysuria, frequency or hematuria  NEUROLOGICAL: No numbness or weakness  SKIN: No itching, burning, rashes, or lesions   All other review of systems is negative unless indicated above.        T(F): 98.2 (10-07-23 @ 07:22), Max: 98.6 (10-06-23 @ 14:55)  HR: 71 (10-07-23 @ 07:22)  BP: 140/74 (10-07-23 @ 07:22)  RR: 18 (10-07-23 @ 07:22)  SpO2: 98% (10-07-23 @ 07:22)  Wt(kg): --    GENERAL: NAD, well-developed  HEAD:  Atraumatic, Normocephalic  EYES: EOMI, PERRLA, conjunctiva and sclera clear  NECK: Supple, No JVD  CHEST/LUNG: Clear to auscultation bilaterally; No wheeze  HEART: Regular rate and rhythm; No murmurs, rubs, or gallops  ABDOMEN: Soft, Nontender, Nondistended; Bowel sounds present  EXTREMITIES:  2+ Peripheral Pulses, No clubbing, cyanosis, or edema  NEUROLOGY: non-focal  SKIN: No rashes or lesions                          9.4    3.19  )-----------( 162      ( 07 Oct 2023 04:51 )             29.7       10-07    141  |  114<H>  |  24<H>  ----------------------------<  84  3.7   |  16<L>  |  1.14    Ca    7.7<L>      07 Oct 2023 04:51  Phos  1.9     10-07  Mg     1.90     10-07    TPro  x   /  Alb  3.1<L>  /  TBili  x   /  DBili  x   /  AST  x   /  ALT  x   /  AlkPhos  x   10-07      Magnesium: 1.90 mg/dL (10-07 @ 04:51)  Phosphorus: 1.9 mg/dL (10-07 @ 04:51)  Magnesium: 2.00 mg/dL (10-06 @ 18:50)  Phosphorus: 1.8 mg/dL (10-06 @ 18:50)          
SURGERY CONSULT NOTE  --------------------------------------------------------------------------------------------    Patient is a 82y old  Female who presents with a chief complaint of diarrhea    HPI: 82F w PMHx HTN, RA, HLD, recently dx STG IV Lung adenocarcinoma on Tagrisso chemotherapy, presents to [facility] ED with diarrhea, nausea, and vomiting. CT A/P obtained demonstrating dilated fluid filled loops of small and large bowel, w/o discrete TP, cf enterocolitis. Surgery consulted for r/o SBO.     Patient HDS and afebrile in ED. Labs w neutropenia WBC 2.7 and anemia Hgb 9.7. Lactate wnl. CT imaging as above.     Seen and examined at bedside.  present. Patient reports subacute diarrhea, nausea, and vomiting for last several weeks. Told by her Hem/Onc that it is related to Tagrisso, therefore medication has been held for 8 days. However sxs not improving. Patient unable to tolerate much PO intake outpatient. Passing flatus/BM today. Diarrhea today. Denies fevers, chills, hematochezia, hematemesis, melena, CP, SOB, dizziness, lightheadedness, abdominal pain.       PAST MEDICAL & SURGICAL HISTORY:  HTN (hypertension)      Rheumatoid arthritis      Hyperlipidemia      S/P tonsillectomy      History of cataract surgery        FAMILY HISTORY:  No pertinent family history in first degree relatives      [] Family history not pertinent as reviewed with the patient and family    ALLERGIES: No Known Allergies      CURRENT MEDICATIONS  MEDICATIONS (STANDING): influenza  Vaccine (HIGH DOSE) 0.7 milliLiter(s) IntraMuscular once  lactated ringers. 1000 milliLiter(s) IV Continuous <Continuous>    MEDICATIONS (PRN):  --------------------------------------------------------------------------------------------    Vitals:   T(C): 37.5 (10-06-23 @ 00:37), Max: 37.5 (10-06-23 @ 00:37)  HR: 76 (10-06-23 @ 01:33) (69 - 81)  BP: 101/53 (10-06-23 @ 01:33) (95/46 - 139/73)  RR: 17 (10-06-23 @ 01:33) (16 - 18)  SpO2: 100% (10-06-23 @ 01:33) (100% - 100%)  CAPILLARY BLOOD GLUCOSE        CAPILLARY BLOOD GLUCOSE            Weight (kg): 41.1454 (10-03 @ 12:00)    PHYSICAL EXAM:    General: Alert, NAD  Neuro: A+Ox3  HEENT: NC/AT, no asymmetry, no scleral icterus  Neck: Soft, supple  Cardio: RRR   Resp: Airway patent, unlabored breathing  Thorax: No chest wall tenderness  GI/Abd: Soft, NT/ND, no rebound/guarding, no masses palpated  Vascular: All 4 extremities warm   Skin: Intact, no breakdown  Musculoskeletal: All 4 extremities moving spontaneously, no limitations  --------------------------------------------------------------------------------------------    LABS  CBC (10-05 @ 15:00)                              9.7<L>                         2.71<L>  )----------------(  134<L>     67.6  % Neutrophils, 21.9  % Lymphocytes, ANC: 1.83                                30.2<L>    BMP (10-05 @ 15:00)             138     |  111<H>  |  34<H> 		Ca++ --      Ca 8.3<L>             ---------------------------------( 96    		Mg 2.30               3.5     |  15<L>   |  1.84<H>			Ph 2.4<L>    LFTs (10-05 @ 15:00)      TPro 7.0 / Alb 3.7 / TBili <0.2 / DBili -- / AST 16 / ALT 15 / AlkPhos 97          VBG (10-05 @ 15:07)     7.20<L> / 43 / 25 / 17<L> / -10.6<L> / 41.7<L>%     Lactate: 1.0    --------------------------------------------------------------------------------------------    MICROBIOLOGY  Urinalysis (10-05 @ 15:00):     Color:  / Appearance:  / SG:  / pH:  / Gluc: 96 / Ketones:  / Bili:  / Urobili:  / Protein : / Nitrites:  / Leuk.Est:  / RBC:  / WBC:  / Sq Epi:  / Non Sq Epi:  / Bacteria          --------------------------------------------------------------------------------------------    IMAGING  < from: CT Abdomen and Pelvis No Cont (10.05.23 @ 16:59) >    ACC: [id number] EXAM:  CT ABDOMEN AND PELVIS   ORDERED BY: GRETEL RAYGOZA     PROCEDURE DATE:  10/05/2023          INTERPRETATION:  CLINICAL INFORMATION: Diarrhea, nausea, vomiting,   abdominal pain.    COMPARISON: CT 9/1/2023    CONTRAST/COMPLICATIONS:  IV Contrast: None  Oral Contrast: None  Complications: None reported at time of study completion    PROCEDURE:  CT of the Abdomen and Pelvis was performed.  Sagittal and coronal reformats were performed.    FINDINGS:  LOWER CHEST: Small left pleural effusion with adjacent compressive   atelectasis, similar to CT 9/1/2023. Masslike opacity in the left lower   lobe along the major fissure partially visualized and similar in   appearance to CT 9/1/2023, possibly representing atelectatic lung. Trace   right pleural effusion. Small pericardial effusion, stable.    LIVER: Stable 4.5 x 3.2 cm cyst at the right hepatic dome.  BILE DUCTS: Normal caliber.  GALLBLADDER: Standard. No calcified gallstones.  SPLEEN: Within normal limits.  PANCREAS: Within normal limits.  ADRENALS: Within normal limits.  KIDNEYS/URETERS: Bilateral renal cysts. No calculus or hydronephrosis.    BLADDER: Within normal limits.  REPRODUCTIVE ORGANS: Within normal limits.    BOWEL: Fluid-filled small and large bowel. No discrete transition point   is identified to suggest small bowel obstruction. The colon is also   adequately distended. Appendix is not visualized. No evidence of   inflammation in the pericecal region.  PERITONEUM: No ascites.  VESSELS: Atherosclerotic changes.  RETROPERITONEUM/LYMPH NODES: No lymphadenopathy.  ABDOMINAL WALL: Within normal limits.  BONES: Degenerative changes throughout the thoracolumbar spine. Mild   retrolisthesis of L1 on L2. Grade 1 anterolisthesis of L4 on L5.   Sclerotic focus in the L1 vertebral body and in the right femur, similar   to the prior CT 9/1/2023, consistent with history of osseous metastases.    IMPRESSION:  Fluid-filled small and large bowel without discrete transition point to   suggest small bowel obstruction. Findings may represent a nonspecific   enterocolitis. If there is clinical concern for bowel obstruction,   consider repeat study with oral contrast.    Other chronic findings as above.        --- End of Report ---            TONY ZHANG DO; Attending Radiologist  This document has been electronically signed. Oct  5 2023  5:18PM    < end of copied text >  
Date of Service 10-06-23 @ 19:46    HPI:  82F PMH HTN, RA, HLD, recently dx STG IV Lung adenocarcinoma on Tagrisso chemotherapy, presents to [facility] ED with diarrhea x2-3 weeks (nonbloody, yellow/mucous, watery) with some intermittent nausea/vomiting. Pt called her oncologist re her sx's was advised to take immodium as may be a side effect of her Tagrisso - but pt hasn't been taking. Pt was advised to stop taking her Tagrisso ~1 month ago, has been taking it about ~1 year; pt states she was also on prednisone 2.5mg every other day for a long time, which was stopped around 1 week prior to onset of her diarrhea. Pt states her PO intake has been very little, and was started on mirtazapine to help with appetite. Pt denies any fever/chills, CP/SOB, issues urinating, back pain, travel/new foods, sick contacts at home, or any other sx's at this time.      ED Course:  - VS: Afebrile, HR 60-80s, SBP 99-130s, 100% RA  - 1L NS + 100cc/hr LR started + NaHCO3 1300mg x1  - CT A/P obtained demonstrating dilated fluid filled loops of small and large bowel, w/o discrete TP, cf enterocolitis  - Surgery evaluation completed, no acute surgical intervention at this time (06 Oct 2023 07:19)      Interval History:   Met patient and her  Gustavo at bedside. Patient reports having diarrhea starting about 2 weeks ago. She shares she spoke to her outpatient oncologist who advised her to hold off on her Tagrisso as diarrhea was possibly a side effect of Tagrisso, pending resolution of diarrhea. Denies abdominal pain. Has had intermittent nausea/emesis recently, but no nausea at this time. Has had poor appetite with poor PO intake     PERTINENT PM/SXH:   HTN (hypertension)  Rheumatoid arthritis  Hyperlipidemia  Stage 4 lung cancer  S/P tonsillectomy  History of cataract surgery    FAMILY HISTORY:  No pertinent family history in first degree relatives    ITEMS NOT CHECKED ARE NOT PRESENT    SOCIAL HISTORY:   Significant other/partner[ x  Children[ x  [employer]/Spirituality:  Substance hx:  [ ]   Tobacco hx:  [ ]   Alcohol hx: [ ]   Home Opioid hx:  [ ] I-Stop Reference No: [id number]  Prescription Information  PDI Filter:    PDI	Current Rx	Drug Type	Rx Written	Rx Dispensed	Drug	Quantity	Days Supply	Prescriber Name	Prescriber MERY #	Payment Method	Dispenser  A	N	O	04/27/2023	04/27/2023	tramadol hcl 50 mg tablet	60	30	Paul Vicente (DO)	[id number]	Medicare	[id number]  Living Situation: [ xHome  [ ]Long term care  [ ]Rehab [ ]Other      ADVANCE DIRECTIVES:    MOLST  [ ]  Living Will  [ ]   DECISION MAKER(s):  [ ] Health Care Proxy(s)  [  x] Surrogate(s)  [ ] Guardian           Name(s): Phone Number(s):  Toni Chen : [phone]   Gustavo Chen : [phone], [phone]     BASELINE (I)ADL(s) (prior to admission):  [place]: [ x]Total  [ ] Moderate [ ]Dependent    Allergies    No Known Allergies    Intolerances    MEDICATIONS  (STANDING):  apixaban 2.5 milliGRAM(s) Oral two times a day  atorvastatin 20 milliGRAM(s) Oral at bedtime  famotidine    Tablet 20 milliGRAM(s) Oral daily  hydroxychloroquine 200 milliGRAM(s) Oral daily  influenza  Vaccine (HIGH DOSE) 0.7 milliLiter(s) IntraMuscular once  lactated ringers. 1000 milliLiter(s) (100 mL/Hr) IV Continuous <Continuous>  lactated ringers. 500 milliLiter(s) (75 mL/Hr) IV Continuous <Continuous>  mirtazapine 7.5 milliGRAM(s) Oral at bedtime    MEDICATIONS  (PRN):  acetaminophen     Tablet .. 650 milliGRAM(s) Oral every 6 hours PRN Temp greater or equal to 38C (100.4F), Mild Pain (1 - 3)  melatonin 3 milliGRAM(s) Oral at bedtime PRN Insomnia        ITEMS UNCHECKED ARE NOT PRESENT     PRESENT SYMPTOMS: [ ]Unable to self-report due to altered mental status  [ ] CPOT [ ] PAINADs [ ] RDOS  Source if other than patient:  [ ]Family   [ ]Team     Pain: [ ]yes [ x]no  QOL impact -   Location -                    Aggravating factors -  Quality -  Radiation -  Timing-  Severity (0-10 scale):  Minimal acceptable level / Pain goal (0-10 scale):     CPOT:    https://www.sccm.org/getattachment/lrr36u11-1a3b-6d0h-4k7c-6491l9523f9n/Critical-Care-Pain-Observation-Tool-(CPOT)    Dyspnea:                           [ ]Mild [ ]Moderate [ ]Severe  Anxiety:                             [ ]Mild [ ]Moderate [ ]Severe  Agitation:                          [ ]Mild [ ]Moderate [ ]Severe  Fatigue:                             [ ]Mild [ ]Moderate [ ]Severe  Nausea:                             [ ]Mild [ ]Moderate [ ]Severe  Loss of appetite:              [ ]Mild [ x]Moderate [ ]Severe  Constipation:                   [ ]Mild [ ]Moderate [ ]Severe  Diarrhea:                          [ ]Mild [ ]Moderate [x ]Severe    PCSSQ[Palliative Care Spiritual Screening Question]   Severity (0-10):  Score of 4 or > indicate consideration of Chaplaincy referral.  Chaplaincy Referral: [ ] yes [ ] refused [ ] following [ x] deferred    Caregiver [place]? : [ ] yes [ ] no [ ] Declined   [x ] Deferred            Social work referral [ ] Patient & Family Centered Care Referral [ ]     Anticipatory Grief present?:  [ ] yes [ ] no  [x ] Deferred                  Social work referral [ ] Chaplaincy Referral[ ]    Other Symptoms:  [x ]All other review of systems negative     PHYSICAL EXAM:  Vital Signs Last 24 Hrs  T(C): 36.4 (06 Oct 2023 17:00), Max: 37.5 (06 Oct 2023 00:37)  T(F): 97.5 (06 Oct 2023 17:00), Max: 99.5 (06 Oct 2023 00:37)  HR: 69 (06 Oct 2023 17:00) (69 - 76)  BP: 123/75 (06 Oct 2023 17:00) (95/46 - 127/68)  BP(mean): --  RR: 18 (06 Oct 2023 17:00) (17 - 18)  SpO2: 100% (06 Oct 2023 17:00) (100% - 100%)    Parameters below as of 06 Oct 2023 17:00  Patient On (Oxygen Delivery Method): room air         I&O's Summary    06 Oct 2023 07:01  -  06 Oct 2023 19:46  --------------------------------------------------------  IN: 600 mL / OUT: 500 mL / NET: 100 mL        GENERAL:  [ x]Alert  [x ]Oriented x3   [ ]Lethargic  [ ]Cachexia  [ ]Unarousable  [ x]Verbal  [ ]Non-Verbal  [ x] No Distress  Behavioral:   [ ] Anxiety  [ ] Delirium [ ] Agitation [x ] Calm  [ ] Other  HEENT:  [ x]Normal  [ ] Temporal Wasting  [ ]Dry mouth   [ ]ET Tube/Trach  [ ]Oral lesions  [ ] Mucositis  PULMONARY:   [x ]Clear [ ]Tachypnea  [ ]Audible excessive secretions   [ ]Rhonchi        [ ]Right [ ]Left [ ]Bilateral  [ ]Crackles        [ ]Right [ ]Left [ ]Bilateral  [ ]Wheezing     [ ]Right [ ]Left [ ]Bilateral  [ ]Diminished breath sounds [ ]right [ ]left [ ]bilateral  CARDIOVASCULAR:    [x ]Regular [ ]Irregular [ ]Tachy  [ ]Primo [ ]Murmur [ ]Other  GASTROINTESTINAL:  [x ]Soft  [ ]Distended   [ ]+BS  [x ]Non tender [ ]Tender  [ ]PEG [ ]OGT/ NGT  Last BM: today  GENITOURINARY:  [x ]Normal [ ] Incontinent   [ ]Oliguria/Anuria   [ ]De Oliveira  MUSCULOSKELETAL:   [ x]Normal   [ ]Weakness  [ ]Bed/Wheelchair bound [ ]Edema  [  ] amputation  [  ] contraction  NEUROLOGIC:   [ x]No focal deficits  [ ]Cognitive impairment  [ ]Dysphagia [ ]Dysarthria [ ]Paresis [ ]Other   SKIN: See Nursing Skin Assessment for further details  [ x]Normal    [ ]Rash  [ ]Pressure ulcer(s)       Present on admission [ ]y [ ]n   [  ]  Wound    [  ] hyperpigmentation    CRITICAL CARE:  [ ] Shock Present  [ ]Septic [ ]Cardiogenic [ ]Neurologic [ ]Hypovolemic  [ ]  Vasopressors [ ]  Inotropes   [ ]Respiratory failure present [ ]Mechanical ventilation [ ]Non-invasive ventilatory support [ ]High flow    [ ]Acute  [ ]Chronic [ ]Hypoxic  [ ]Hypercarbic [ ]Other  [ ]Other organ failure     LABS:  reviewed                         9.7    2.71  )-----------( 134      ( 05 Oct 2023 15:00 )             30.2   10-06    141  |  114<H>  |  24<H>  ----------------------------<  87  3.6   |  18<L>  |  1.20    Ca    8.0<L>      06 Oct 2023 18:50  Phos  1.8     10-06  Mg     2.00     10-06    TPro  7.0  /  Alb  3.7  /  TBili  <0.2  /  DBili  x   /  AST  16  /  ALT  15  /  AlkPhos  97  10-05    Urinalysis Basic - ( 06 Oct 2023 18:50 )    Color: x / Appearance: x / SG: x / pH: x  Gluc: 87 mg/dL / Ketone: x  / Bili: x / Urobili: x   Blood: x / Protein: x / Nitrite: x   Leuk Esterase: x / RBC: x / WBC x   Sq Epi: x / Non Sq Epi: x / Bacteria: x      CAPILLARY BLOOD GLUCOSE          RADIOLOGY & ADDITIONAL STUDIES: reviewed     PROTEIN CALORIE MALNUTRITION PRESENT: [ ]mild [ ]moderate [x ]severe [ ]underweight [ ]morbid obesity  https://www.andeal.org/vault/2440/web/files/ONC/Table_Clinical%20Characteristics%20to%20Document%20Malnutrition-White%20JV%20et%20al%202012.pdf    Height (cm): 154.9 (10-06-23 @ 02:34), 152.4 (07-25-23 @ 12:00)  Weight (kg): 42.8 (10-06-23 @ 02:34), 41.1454 (10-03-23 @ 12:00)  BMI (kg/m2): 17.8 (10-06-23 @ 02:34), 17.7 (10-03-23 @ 12:00)    [ ]PPSV2 < or = to 30% [ ]significant weight loss  [ ]poor nutritional intake  [ ]anasarca [ ]Artificial Nutrition      REFERRALS:   [ ]Chaplaincy  [ ]Hospice  [ ]Child Life  [ ]Social Work  [ x]Case management [ ]Holistic Therapy

## 2023-10-07 NOTE — PROGRESS NOTE ADULT - SUBJECTIVE AND OBJECTIVE BOX
PROGRESS NOTE:     Patient is a 82y old  Female who presents with a chief complaint of Failure to thrive in adult     (06 Oct 2023 13:02)      SUBJECTIVE / OVERNIGHT EVENTS: No acute events overnight per night team.     ADDITIONAL REVIEW OF SYSTEMS:    MEDICATIONS  (STANDING):  apixaban 2.5 milliGRAM(s) Oral two times a day  atorvastatin 20 milliGRAM(s) Oral at bedtime  famotidine    Tablet 20 milliGRAM(s) Oral daily  hydroxychloroquine 200 milliGRAM(s) Oral daily  influenza  Vaccine (HIGH DOSE) 0.7 milliLiter(s) IntraMuscular once  lactated ringers. 500 milliLiter(s) (75 mL/Hr) IV Continuous <Continuous>  lactated ringers. 1000 milliLiter(s) (100 mL/Hr) IV Continuous <Continuous>  mirtazapine 7.5 milliGRAM(s) Oral at bedtime    MEDICATIONS  (PRN):  acetaminophen     Tablet .. 650 milliGRAM(s) Oral every 6 hours PRN Temp greater or equal to 38C (100.4F), Mild Pain (1 - 3)  melatonin 3 milliGRAM(s) Oral at bedtime PRN Insomnia      CAPILLARY BLOOD GLUCOSE        I&O's Summary    06 Oct 2023 07:01  -  07 Oct 2023 07:00  --------------------------------------------------------  IN: 600 mL / OUT: 500 mL / NET: 100 mL        PHYSICAL EXAM:  Vital Signs Last 24 Hrs  T(C): 36.8 (07 Oct 2023 07:22), Max: 37 (06 Oct 2023 14:55)  T(F): 98.2 (07 Oct 2023 07:22), Max: 98.6 (06 Oct 2023 14:55)  HR: 71 (07 Oct 2023 07:22) (69 - 85)  BP: 140/74 (07 Oct 2023 07:22) (123/65 - 140/74)  BP(mean): 89 (07 Oct 2023 07:22) (89 - 89)  RR: 18 (07 Oct 2023 07:22) (18 - 18)  SpO2: 98% (07 Oct 2023 07:22) (98% - 100%)    Parameters below as of 07 Oct 2023 07:22  Patient On (Oxygen Delivery Method): room air        CONSTITUTIONAL: NAD, well-developed  RESPIRATORY: Normal respiratory effort; lungs are clear to auscultation bilaterally  CARDIOVASCULAR: Regular rate and rhythm, normal S1 and S2, no murmur/rub/gallop; No lower extremity edema; Peripheral pulses are 2+ bilaterally  ABDOMEN: Nontender to palpation, normoactive bowel sounds, no rebound/guarding; No hepatosplenomegaly  MUSCULOSKELETAL: no clubbing or cyanosis of digits; no joint swelling or tenderness to palpation  PSYCH: A+O to person, place, and time; affect appropriate    LABS:                        9.4    3.19  )-----------( 162      ( 07 Oct 2023 04:51 )             29.7     10-07    141  |  114<H>  |  24<H>  ----------------------------<  84  3.7   |  16<L>  |  1.14    Ca    7.7<L>      07 Oct 2023 04:51  Phos  1.9     10-07  Mg     1.90     10-07    TPro  7.0  /  Alb  3.7  /  TBili  <0.2  /  DBili  x   /  AST  16  /  ALT  15  /  AlkPhos  97  10-05          Urinalysis Basic - ( 07 Oct 2023 04:51 )    Color: x / Appearance: x / SG: x / pH: x  Gluc: 84 mg/dL / Ketone: x  / Bili: x / Urobili: x   Blood: x / Protein: x / Nitrite: x   Leuk Esterase: x / RBC: x / WBC x   Sq Epi: x / Non Sq Epi: x / Bacteria: x          RADIOLOGY & ADDITIONAL TESTS:  Results Reviewed:   Imaging Personally Reviewed:  Electrocardiogram Personally Reviewed:    COORDINATION OF CARE:  Care Discussed with Consultants/Other Providers [Y/N]:  Prior or Outpatient Records Reviewed [Y/N]:      ******************************  Authored By: Jewel Garcia MD PGY3  Internal Medicine  MS Teams - Call or Text  ******************************   PROGRESS NOTE:     Patient is a 82y old  Female who presents with a chief complaint of Failure to thrive in adult     (06 Oct 2023 13:02)      SUBJECTIVE / OVERNIGHT EVENTS: No acute events overnight per night team.     ADDITIONAL REVIEW OF SYSTEMS:    MEDICATIONS  (STANDING):  apixaban 2.5 milliGRAM(s) Oral two times a day  atorvastatin 20 milliGRAM(s) Oral at bedtime  famotidine    Tablet 20 milliGRAM(s) Oral daily  hydroxychloroquine 200 milliGRAM(s) Oral daily  influenza  Vaccine (HIGH DOSE) 0.7 milliLiter(s) IntraMuscular once  lactated ringers. 500 milliLiter(s) (75 mL/Hr) IV Continuous <Continuous>  lactated ringers. 1000 milliLiter(s) (100 mL/Hr) IV Continuous <Continuous>  mirtazapine 7.5 milliGRAM(s) Oral at bedtime    MEDICATIONS  (PRN):  acetaminophen     Tablet .. 650 milliGRAM(s) Oral every 6 hours PRN Temp greater or equal to 38C (100.4F), Mild Pain (1 - 3)  melatonin 3 milliGRAM(s) Oral at bedtime PRN Insomnia      CAPILLARY BLOOD GLUCOSE        I&O's Summary    06 Oct 2023 07:01  -  07 Oct 2023 07:00  --------------------------------------------------------  IN: 600 mL / OUT: 500 mL / NET: 100 mL        PHYSICAL EXAM:  Vital Signs Last 24 Hrs  T(C): 36.8 (07 Oct 2023 07:22), Max: 37 (06 Oct 2023 14:55)  T(F): 98.2 (07 Oct 2023 07:22), Max: 98.6 (06 Oct 2023 14:55)  HR: 71 (07 Oct 2023 07:22) (69 - 85)  BP: 140/74 (07 Oct 2023 07:22) (123/65 - 140/74)  BP(mean): 89 (07 Oct 2023 07:22) (89 - 89)  RR: 18 (07 Oct 2023 07:22) (18 - 18)  SpO2: 98% (07 Oct 2023 07:22) (98% - 100%)    Parameters below as of 07 Oct 2023 07:22  Patient On (Oxygen Delivery Method): room air        GENERAL: NAD, lying in bed comfortably  HEAD:  Atraumatic, Normocephalic  EYES: EOMI, PERRLA, conjunctiva and sclera clear  ENT: Moist mucous membranes  NECK: Supple, No JVD  CHEST/LUNG: Clear to auscultation bilaterally; No rales, rhonchi, wheezing, or rubs. Unlabored respirations  HEART: Regular rate and rhythm; No murmurs, rubs, or gallops'   ABDOMEN: BSx4; Soft, nontender, nondistended  EXTREMITIES:  2+ Peripheral Pulses, brisk capillary refill. No clubbing, cyanosis; +b/l peripheral edema  NERVOUS SYSTEM:  A&Ox3, no focal deficits   SKIN: No rashes or lesions  Psych: Normal speech, normal behavior, normal affect    LABS:                        9.4    3.19  )-----------( 162      ( 07 Oct 2023 04:51 )             29.7     10-07    141  |  114<H>  |  24<H>  ----------------------------<  84  3.7   |  16<L>  |  1.14    Ca    7.7<L>      07 Oct 2023 04:51  Phos  1.9     10-07  Mg     1.90     10-07    TPro  7.0  /  Alb  3.7  /  TBili  <0.2  /  DBili  x   /  AST  16  /  ALT  15  /  AlkPhos  97  10-05          Urinalysis Basic - ( 07 Oct 2023 04:51 )    Color: x / Appearance: x / SG: x / pH: x  Gluc: 84 mg/dL / Ketone: x  / Bili: x / Urobili: x   Blood: x / Protein: x / Nitrite: x   Leuk Esterase: x / RBC: x / WBC x   Sq Epi: x / Non Sq Epi: x / Bacteria: x          RADIOLOGY & ADDITIONAL TESTS:  Results Reviewed:   Imaging Personally Reviewed:  Electrocardiogram Personally Reviewed:    COORDINATION OF CARE:  Care Discussed with Consultants/Other Providers [Y/N]:  Prior or Outpatient Records Reviewed [Y/N]:      ******************************  Authored By: Jewel Garcia MD PGY3  Internal Medicine  MS Teams - Call or Text  ******************************

## 2023-10-08 NOTE — PROGRESS NOTE ADULT - PROBLEM SELECTOR PLAN 1
Likely iso recent medication changes (Tagrisso)  - Surgery evaluated, no acute intervention for concern of SBO on CT finding; pt passing gas/BMs  - GI PCR, stool cx, C. diff >> f/u; immodium if negative  - Advance diet as tolerated, pt eating regular diet adequately  - GI evaluation recommended by oncology o/p, may f/u o/p Likely iso recent medication changes (Tagrisso)  - Surgery evaluated, no acute intervention for concern of SBO on CT finding; pt passing gas/BMs  - GI PCR, stool cx, C. diff >> f/u negative so far; immodium if negative  - Advance diet as tolerated, pt eating regular diet adequately  - GI evaluation recommended by oncology o/p, may f/u o/p

## 2023-10-08 NOTE — PROGRESS NOTE ADULT - SUBJECTIVE AND OBJECTIVE BOX
PROGRESS NOTE:     Patient is a 82y old  Female who presents with a chief complaint of Failure to thrive in adult     (06 Oct 2023 13:02)      SUBJECTIVE / OVERNIGHT EVENTS: No acute events overnight per night team.     ADDITIONAL REVIEW OF SYSTEMS:    MEDICATIONS  (STANDING):  apixaban 2.5 milliGRAM(s) Oral two times a day  atorvastatin 20 milliGRAM(s) Oral at bedtime  famotidine    Tablet 20 milliGRAM(s) Oral daily  hydroxychloroquine 200 milliGRAM(s) Oral daily  influenza  Vaccine (HIGH DOSE) 0.7 milliLiter(s) IntraMuscular once  lactated ringers. 500 milliLiter(s) (75 mL/Hr) IV Continuous <Continuous>  lactated ringers. 1000 milliLiter(s) (100 mL/Hr) IV Continuous <Continuous>  mirtazapine 7.5 milliGRAM(s) Oral at bedtime    MEDICATIONS  (PRN):  acetaminophen     Tablet .. 650 milliGRAM(s) Oral every 6 hours PRN Temp greater or equal to 38C (100.4F), Mild Pain (1 - 3)  melatonin 3 milliGRAM(s) Oral at bedtime PRN Insomnia      CAPILLARY BLOOD GLUCOSE        I&O's Summary    06 Oct 2023 07:01  -  07 Oct 2023 07:00  --------------------------------------------------------  IN: 600 mL / OUT: 500 mL / NET: 100 mL        PHYSICAL EXAM:  Vital Signs Last 24 Hrs  T(C): 36.4 (08 Oct 2023 05:44), Max: 37.3 (07 Oct 2023 21:55)  T(F): 97.5 (08 Oct 2023 05:44), Max: 99.1 (07 Oct 2023 21:55)  HR: 70 (08 Oct 2023 05:44) (67 - 72)  BP: 144/76 (08 Oct 2023 05:44) (123/65 - 144/76)  BP(mean): --  RR: 16 (08 Oct 2023 05:44) (16 - 18)  SpO2: 98% (08 Oct 2023 05:44) (98% - 100%)    Parameters below as of 08 Oct 2023 05:44  Patient On (Oxygen Delivery Method): room air      GENERAL: NAD, lying in bed comfortably  HEAD:  Atraumatic, Normocephalic  EYES: EOMI, PERRLA, conjunctiva and sclera clear  ENT: Moist mucous membranes  NECK: Supple, No JVD  CHEST/LUNG: Clear to auscultation bilaterally; No rales, rhonchi, wheezing, or rubs. Unlabored respirations  HEART: Regular rate and rhythm; No murmurs, rubs, or gallops'   ABDOMEN: BSx4; Soft, nontender, nondistended  EXTREMITIES:  2+ Peripheral Pulses, brisk capillary refill. No clubbing, cyanosis; +b/l peripheral edema  NERVOUS SYSTEM:  A&Ox3, no focal deficits   SKIN: No rashes or lesions  Psych: Normal speech, normal behavior, normal affect    LABS:                   Urinalysis Basic - ( 07 Oct 2023 04:51 )    Color: x / Appearance: x / SG: x / pH: x  Gluc: 84 mg/dL / Ketone: x  / Bili: x / Urobili: x   Blood: x / Protein: x / Nitrite: x   Leuk Esterase: x / RBC: x / WBC x   Sq Epi: x / Non Sq Epi: x / Bacteria: x          RADIOLOGY & ADDITIONAL TESTS:  Results Reviewed:   Imaging Personally Reviewed:  Electrocardiogram Personally Reviewed:    COORDINATION OF CARE:  Care Discussed with Consultants/Other Providers [Y/N]:  Prior or Outpatient Records Reviewed [Y/N]:   PROGRESS NOTE:     Patient is a 82y old  Female who presents with a chief complaint of Failure to thrive in adult     (06 Oct 2023 13:02)      SUBJECTIVE / OVERNIGHT EVENTS: No acute events overnight per night team. Pt without diarrhea since yesterday mid morning. Pt is feeling well, denied abdominal pain.     ADDITIONAL REVIEW OF SYSTEMS:    MEDICATIONS  (STANDING):  apixaban 2.5 milliGRAM(s) Oral two times a day  atorvastatin 20 milliGRAM(s) Oral at bedtime  famotidine    Tablet 20 milliGRAM(s) Oral daily  hydroxychloroquine 200 milliGRAM(s) Oral daily  influenza  Vaccine (HIGH DOSE) 0.7 milliLiter(s) IntraMuscular once  lactated ringers. 500 milliLiter(s) (75 mL/Hr) IV Continuous <Continuous>  lactated ringers. 1000 milliLiter(s) (100 mL/Hr) IV Continuous <Continuous>  mirtazapine 7.5 milliGRAM(s) Oral at bedtime    MEDICATIONS  (PRN):  acetaminophen     Tablet .. 650 milliGRAM(s) Oral every 6 hours PRN Temp greater or equal to 38C (100.4F), Mild Pain (1 - 3)  melatonin 3 milliGRAM(s) Oral at bedtime PRN Insomnia      CAPILLARY BLOOD GLUCOSE        I&O's Summary    06 Oct 2023 07:01  -  07 Oct 2023 07:00  --------------------------------------------------------  IN: 600 mL / OUT: 500 mL / NET: 100 mL        PHYSICAL EXAM:  Vital Signs Last 24 Hrs  T(C): 36.4 (08 Oct 2023 05:44), Max: 37.3 (07 Oct 2023 21:55)  T(F): 97.5 (08 Oct 2023 05:44), Max: 99.1 (07 Oct 2023 21:55)  HR: 70 (08 Oct 2023 05:44) (67 - 72)  BP: 144/76 (08 Oct 2023 05:44) (123/65 - 144/76)  BP(mean): --  RR: 16 (08 Oct 2023 05:44) (16 - 18)  SpO2: 98% (08 Oct 2023 05:44) (98% - 100%)    Parameters below as of 08 Oct 2023 05:44  Patient On (Oxygen Delivery Method): room air      GENERAL: NAD, lying in bed comfortably  HEAD:  Atraumatic, Normocephalic  EYES: EOMI, PERRLA, conjunctiva and sclera clear  ENT: Moist mucous membranes  NECK: Supple, No JVD  CHEST/LUNG: Clear to auscultation bilaterally; No rales, rhonchi, wheezing, or rubs. Unlabored respirations  HEART: Regular rate and rhythm; No murmurs, rubs, or gallops'   ABDOMEN: BSx4; Soft, nontender, nondistended  EXTREMITIES:  2+ Peripheral Pulses, brisk capillary refill. No clubbing, cyanosis; +b/l peripheral edema  NERVOUS SYSTEM:  A&Ox3, no focal deficits   SKIN: No rashes or lesions  Psych: Normal speech, normal behavior, normal affect    LABS:             CBC Full  -  ( 08 Oct 2023 07:14 )  WBC Count : 3.29 K/uL  RBC Count : 3.04 M/uL  Hemoglobin : 9.0 g/dL  Hematocrit : 27.6 %  Platelet Count - Automated : 172 K/uL  Mean Cell Volume : 90.8 fL  Mean Cell Hemoglobin : 29.6 pg  Mean Cell Hemoglobin Concentration : 32.6 gm/dL  Auto Neutrophil # : x  Auto Lymphocyte # : x  Auto Monocyte # : x  Auto Eosinophil # : x  Auto Basophil # : x  Auto Neutrophil % : x  Auto Lymphocyte % : x  Auto Monocyte % : x  Auto Eosinophil % : x  Auto Basophil % : x    10-08    142  |  113<H>  |  17  ----------------------------<  76  3.5   |  17<L>  |  0.98    Ca    7.8<L>      08 Oct 2023 07:14  Phos  1.7     10-08  Mg     1.80     10-08    TPro  x   /  Alb  3.1<L>  /  TBili  x   /  DBili  x   /  AST  x   /  ALT  x   /  AlkPhos  x   10-07        Urinalysis Basic - ( 07 Oct 2023 04:51 )    Color: x / Appearance: x / SG: x / pH: x  Gluc: 84 mg/dL / Ketone: x  / Bili: x / Urobili: x   Blood: x / Protein: x / Nitrite: x   Leuk Esterase: x / RBC: x / WBC x   Sq Epi: x / Non Sq Epi: x / Bacteria: x          RADIOLOGY & ADDITIONAL TESTS:  Results Reviewed:   Imaging Personally Reviewed:  Electrocardiogram Personally Reviewed:    COORDINATION OF CARE:  Care Discussed with Consultants/Other Providers [Y/N]:  Prior or Outpatient Records Reviewed [Y/N]:

## 2023-10-09 NOTE — PROGRESS NOTE ADULT - PROBLEM SELECTOR PLAN 2
Non-gap metabolic acidosis with CARYN, likely starvation & diarrhea component  - BUN/SCr 34/1.84; baseline 1.16 recently  - Bladder scan  - ~1L fluid with IV prior to admission; advance diet  - Avoid nephrotoxic medications, renal dosing
- Patient with Stage 4 Lung Adenocarcinoma on Tagrisso  - Follows outpatient with Dr. Persaud   - Outpatient oncology notes reviewed   - Oncology recommendations appreciated

## 2023-10-09 NOTE — DISCHARGE NOTE PROVIDER - NSDCCPTREATMENT_GEN_ALL_CORE_FT
PRINCIPAL PROCEDURE  Procedure: CT abdomen & pelvis  Findings and Treatment:   PROCEDURE:  CT of the Abdomen and Pelvis was performed.  Sagittal and coronal reformats were performed.  FINDINGS:  LOWER CHEST: Small left pleural effusion with adjacent compressive   atelectasis, similar to CT 9/1/2023. Masslike opacity in the left lower   lobe along the major fissure partially visualized and similar in   appearance to CT 9/1/2023, possibly representing atelectatic lung. Trace   right pleural effusion. Small pericardial effusion, stable.  LIVER: Stable 4.5 x 3.2 cm cyst at the right hepatic dome.  BILE DUCTS: Normal caliber.  GALLBLADDER: Standard. No calcified gallstones.  SPLEEN: Within normal limits.  PANCREAS: Within normal limits.  ADRENALS: Within normal limits.  KIDNEYS/URETERS: Bilateral renal cysts. No calculus or hydronephrosis.  BLADDER: Within normal limits.  REPRODUCTIVE ORGANS: Within normal limits.  BOWEL: Fluid-filled small and large bowel. No discrete transition point   is identified to suggest small bowel obstruction. The colon is also   adequately distended. Appendix is not visualized. No evidence of   inflammation in the pericecal region.  PERITONEUM: No ascites.  VESSELS: Atherosclerotic changes.  RETROPERITONEUM/LYMPH NODES: No lymphadenopathy.  ABDOMINAL WALL: Within normal limits.  BONES: Degenerative changes throughout the thoracolumbar spine. Mild   retrolisthesis of L1 on L2. Grade 1 anterolisthesis of L4 on L5.   Sclerotic focus in the L1 vertebral body and in the right femur, similar   to the prior CT 9/1/2023, consistent with history of osseous metastases.  IMPRESSION:  Fluid-filled small and large bowel without discrete transition point to   suggest small bowel obstruction. Findings may represent a nonspecific   enterocolitis. If there is clinical concern for bowel obstruction,   consider repeat study with oral contrast.  Other chronic findings as above.

## 2023-10-09 NOTE — DISCHARGE NOTE PROVIDER - NSFOLLOWUPCLINICS_GEN_ALL_ED_FT
[facility]  Hematology/Oncology  [address] [id number]  Phone: [phone]  Fax:

## 2023-10-09 NOTE — PROGRESS NOTE ADULT - PROBLEM SELECTOR PLAN 1
Likely iso recent medication changes (Tagrisso)  - Surgery evaluated, no acute intervention for concern of SBO on CT finding; pt passing gas/BMs  - GI PCR, stool cx, C. diff >> f/u negative so far; immodium if negative  - Advance diet as tolerated, pt eating regular diet adequately  - GI evaluation recommended by oncology o/p, may f/u o/p

## 2023-10-09 NOTE — PROGRESS NOTE ADULT - PROBLEM SELECTOR PROBLEM 5
History of pulmonary embolus (PE)
Severe protein-calorie malnutrition

## 2023-10-09 NOTE — PROGRESS NOTE ADULT - PROBLEM SELECTOR PLAN 5
- 9/2022 diagnosed during CT for lung cancer  - C/w home eliquis 2.5 BID
Nutrition recommendations appreciated.

## 2023-10-09 NOTE — DISCHARGE NOTE PROVIDER - NSDCCPCAREPLAN_GEN_ALL_CORE_FT
PRINCIPAL DISCHARGE DIAGNOSIS  Diagnosis: FTT (failure to thrive) in adult  Assessment and Plan of Treatment:       SECONDARY DISCHARGE DIAGNOSES  Diagnosis: Diarrhea  Assessment and Plan of Treatment: Diarrhea can occur for different reasons. In your case this was attributed to getting cancer treatment and this is quite common.  Diarrhea can be caused by:  Some medicines that damage the lining of your intestines.  Radiation therapy aimed at your belly or pelvis.  Surgery to remove part of your intestines.  A side effect of bone marrow transplants called dsmlm-ztleox-krrs disease.  Some infections that affect your bowels.  Severe constipation. Sometimes when you get really constipated, watery stool is the only stool that can get past the hardened stool.  Some types of cancer, such as colon cancer, can cause diarrhea directly. Plus, the stress of having cancer can lead to diarrhea.  Diarrhea from cancer treatment may be just a minor problem or a sign of more serious problems. Always tell your doctor if you have diarrhea, especially if you see blood in it. Changes in your diet may solve the problem. Your doctor may prescribe medicine if your diarrhea is severe.  ---  Seek immediate medical care if:  You are vomiting.  You have new or worse belly pain.  You have a fever.  You cannot pass stools or gas.  You have new or more blood in your stools.

## 2023-10-09 NOTE — DISCHARGE NOTE NURSING/CASE MANAGEMENT/SOCIAL WORK - NSDCPEFALRISK_GEN_ALL_CORE
For information on Fall & Injury Prevention, visit: [email or web address] OR  [email or web address] OR  https://www.cdc.gov/steadi/patient.html

## 2023-10-09 NOTE — PROGRESS NOTE ADULT - ATTENDING COMMENTS
82F with HTN, RA, HLD, stage IV lung adenocarcinoma on Tagrisso, who presents with 2 weeks of diarrhea, and nausea/vomiting. Pt has seen her PMD and oncologist for this, concern is that diarrhea is 2/2 medication side effect. CT A/P with dilated loops of bowel, consistent with enterocolitis    # Enterocolitis- likely 2/2 tragrisso side effect. Onc recs appreciated. C diff negative. Diarrhea resolved. Low suspicion for infectious process. If diarrhea does not recur- no additional workup needed.  # Stage IV lung adenocarcinoma- oncology consulted. D/w outpatient geriatrician, [facility] ongoing. Palliative consulted.    TTE unremarkable    DC home today  Dc planning 35min
82F with HTN, RA, HLD, stage IV lung adenocarcinoma on Tagrisso, who presents with 2 weeks of diarrhea, and nausea/vomiting. Pt has seen her PMD and oncologist for this, concern is that diarrhea is 2/2 medication side effect. CT A/P with dilated loops of bowel, consistent with enterocolitis    # Enterocolitis- likely 2/2 tragrisso side effect. Onc recs appreciated. C diff negative. Diarrhea subsiding. Pending GI PCR.  # Stage IV lung adenocarcinoma- oncology consulted. D/w outpatient geriatrician, [facility] ongoing. Palliative consulted.    Plan d/w   at bedside
82F with HTN, RA, HLD, stage IV lung adenocarcinoma on Tagrisso, who presents with 2 weeks of diarrhea, and nausea/vomiting. Pt has seen her PMD and oncologist for this, concern is that diarrhea is 2/2 medication side effect. CT A/P with dilated loops of bowel, consistent with enterocolitis    # Enterocolitis- likely 2/2 tragrisso side effect. Onc recs appreciated. C diff negative. Diarrhea resolved. Low suspicion for infectious process. If diarrhea does not recur- no additional workup needed.  # Stage IV lung adenocarcinoma- oncology consulted. D/w outpatient geriatrician, [facility] ongoing. Palliative consulted.    ECHO also pending.    DC planning. Anticipate return home.

## 2023-10-09 NOTE — DISCHARGE NOTE PROVIDER - NSDCMRMEDTOKEN_GEN_ALL_CORE_FT
atorvastatin 20 mg oral tablet: 1 tab(s) orally once a day (at bedtime)  Eliquis 2.5 mg oral tablet: 1 tab(s) orally 2 times a day  hydroxychloroquine 200 mg oral tablet: 1 tab(s) orally once a day  mirtazapine 7.5 mg oral tablet: 2 tab(s) orally once a day  Pepcid 40 mg oral tablet: 1 tab(s) orally once a day  Tagrisso 80 mg oral tablet: 80 milligram(s) orally once a day  Xgeva 120 mg/1.7 mL subcutaneous solution: 120 milligram(s) subcutaneously   atorvastatin 20 mg oral tablet: 1 tab(s) orally once a day (at bedtime)  Eliquis 2.5 mg oral tablet: 1 tab(s) orally 2 times a day  hydroxychloroquine 200 mg oral tablet: 1 tab(s) orally once a day  mirtazapine 7.5 mg oral tablet: 2 tab(s) orally once a day  Pepcid 40 mg oral tablet: 1 tab(s) orally once a day  Tagrisso 40 mg oral tablet: 1 tab(s) orally once a day  Xgeva 120 mg/1.7 mL subcutaneous solution: 120 milligram(s) subcutaneously

## 2023-10-09 NOTE — PROGRESS NOTE ADULT - PROBLEM SELECTOR PLAN 3
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 Persaud >> emailed, made aware
- resolved  - tolerated PO diet

## 2023-10-09 NOTE — PROGRESS NOTE ADULT - PROBLEM SELECTOR PLAN 6
- C/w home atorvastatin 20mg
Patient states that her Tagrisso dose has been reduced due to side effects. She states she has an upcoming outpatient appointment with oncology team. Encouraged her to speak further with outpatient oncology team regarding disease status, treatment response from Tagrisso, and prognosis.   Emotional support provided.     Updated outpatient geriatric team regarding care plan    Thank you for allowing us to participate in your patient's care. Please page [phone] for any questions/concerns.

## 2023-10-09 NOTE — PROGRESS NOTE ADULT - PROBLEM SELECTOR PLAN 1
- CT Abd / Pelvis 10/5- Fluid-filled small and large bowel without discrete transition point to suggest small bowel obstruction. Findings may represent a nonspecific enterocolitis.  - Surgery recommendations appreciated  - C DIff negative   - Diarrhea improving as per patient

## 2023-10-09 NOTE — DISCHARGE NOTE PROVIDER - CARE PROVIDER_API CALL
Gladis Rosario  Internal Medicine  [address], [address]  [place] [address]  Phone: [phone]  Fax: [phone]  Follow Up Time:

## 2023-10-09 NOTE — PROGRESS NOTE ADULT - SUBJECTIVE AND OBJECTIVE BOX
PROGRESS NOTE:   Authored by Mannie Avalos MD  Pager:  [phone]    Patient is a 82y old  Female who presents with a chief complaint of Failure to thrive in adult     (06 Oct 2023 13:02)      SUBJECTIVE / OVERNIGHT EVENTS:    ADDITIONAL REVIEW OF SYSTEMS:    MEDICATIONS  (STANDING):  apixaban 2.5 milliGRAM(s) Oral two times a day  atorvastatin 20 milliGRAM(s) Oral at bedtime  famotidine    Tablet 20 milliGRAM(s) Oral daily  hydroxychloroquine 200 milliGRAM(s) Oral daily  influenza  Vaccine (HIGH DOSE) 0.7 milliLiter(s) IntraMuscular once  lactated ringers. 500 milliLiter(s) (75 mL/Hr) IV Continuous <Continuous>  lactated ringers. 1000 milliLiter(s) (100 mL/Hr) IV Continuous <Continuous>  mirtazapine 7.5 milliGRAM(s) Oral at bedtime    MEDICATIONS  (PRN):  acetaminophen     Tablet .. 650 milliGRAM(s) Oral every 6 hours PRN Temp greater or equal to 38C (100.4F), Mild Pain (1 - 3)  melatonin 3 milliGRAM(s) Oral at bedtime PRN Insomnia      CAPILLARY BLOOD GLUCOSE        I&O's Summary      PHYSICAL EXAM:  Vital Signs Last 24 Hrs  T(C): 36.9 (09 Oct 2023 05:36), Max: 37.1 (08 Oct 2023 14:15)  T(F): 98.5 (09 Oct 2023 05:36), Max: 98.7 (08 Oct 2023 14:15)  HR: 73 (09 Oct 2023 05:36) (73 - 98)  BP: 132/68 (09 Oct 2023 05:36) (115/61 - 134/80)  BP(mean): --  RR: 16 (09 Oct 2023 05:36) (16 - 17)  SpO2: 100% (09 Oct 2023 05:36) (98% - 100%)    Parameters below as of 09 Oct 2023 05:36  Patient On (Oxygen Delivery Method): room air        GENERAL: No acute distress, well-developed  HEAD:  Atraumatic, Normocephalic  EYES: EOMI, PERRLA, conjunctiva and sclera clear  NECK: Supple, no lymphadenopathy, no JVD  CHEST/LUNG: CTAB; No wheezes, rales, or rhonchi  HEART: Regular rate and rhythm; No murmurs, rubs, or gallops  ABDOMEN: Soft, non-tender, non-distended; normal bowel sounds, no organomegaly  EXTREMITIES:  2+ peripheral pulses b/l, No clubbing, cyanosis, or edema  NEUROLOGY: A&O x 3, no focal deficits  SKIN: No rashes or lesions    LABS:                        9.0    3.29  )-----------( 172      ( 08 Oct 2023 07:14 )             27.6     10-08    142  |  113<H>  |  17  ----------------------------<  76  3.5   |  17<L>  |  0.98    Ca    7.8<L>      08 Oct 2023 07:14  Phos  1.7     10-08  Mg     1.80     10-08            Urinalysis Basic - ( 08 Oct 2023 07:14 )    Color: x / Appearance: x / SG: x / pH: x  Gluc: 76 mg/dL / Ketone: x  / Bili: x / Urobili: x   Blood: x / Protein: x / Nitrite: x   Leuk Esterase: x / RBC: x / WBC x   Sq Epi: x / Non Sq Epi: x / Bacteria: x        Culture - Stool (collected 06 Oct 2023 12:25)  Source: .Stool Feces  Final Report (08 Oct 2023 15:26):    No enteric pathogens isolated.    (Stool culture examined for Salmonella,    Shigella, Campylobacter, Aeromonas, Plesiomonas,    Vibrio, E.coli O157 and Yersinia)        RADIOLOGY & ADDITIONAL TESTS:  Results Reviewed:   Imaging Personally Reviewed:  Electrocardiogram Personally Reviewed:    COORDINATION OF CARE:  Care Discussed with Consultants/Other Providers [Y/N]:  Prior or Outpatient Records Reviewed [Y/N]:   PROGRESS NOTE:   Authored by Mannie Avalos MD  Pager:  [phone]    Patient is a 82y old  Female who presents with a chief complaint of Failure to thrive in adult     (06 Oct 2023 13:02)      SUBJECTIVE / OVERNIGHT EVENTS: NAEO. Patient endorses no new complaints today    ADDITIONAL REVIEW OF SYSTEMS:  REVIEW OF SYSTEMS:    CONSTITUTIONAL: No weakness, fevers or chills  RESPIRATORY: No cough, wheezing, hemoptysis; No shortness of breath  CARDIOVASCULAR: No chest pain or palpitations  GASTROINTESTINAL: No abdominal or epigastric pain. No nausea, vomiting, or hematemesis; No melena or hematochezia.  SKIN: No itching, rashes      MEDICATIONS  (STANDING):  apixaban 2.5 milliGRAM(s) Oral two times a day  atorvastatin 20 milliGRAM(s) Oral at bedtime  famotidine    Tablet 20 milliGRAM(s) Oral daily  hydroxychloroquine 200 milliGRAM(s) Oral daily  influenza  Vaccine (HIGH DOSE) 0.7 milliLiter(s) IntraMuscular once  lactated ringers. 500 milliLiter(s) (75 mL/Hr) IV Continuous <Continuous>  lactated ringers. 1000 milliLiter(s) (100 mL/Hr) IV Continuous <Continuous>  mirtazapine 7.5 milliGRAM(s) Oral at bedtime    MEDICATIONS  (PRN):  acetaminophen     Tablet .. 650 milliGRAM(s) Oral every 6 hours PRN Temp greater or equal to 38C (100.4F), Mild Pain (1 - 3)  melatonin 3 milliGRAM(s) Oral at bedtime PRN Insomnia      CAPILLARY BLOOD GLUCOSE        I&O's Summary      PHYSICAL EXAM:  Vital Signs Last 24 Hrs  T(C): 36.9 (09 Oct 2023 05:36), Max: 37.1 (08 Oct 2023 14:15)  T(F): 98.5 (09 Oct 2023 05:36), Max: 98.7 (08 Oct 2023 14:15)  HR: 73 (09 Oct 2023 05:36) (73 - 98)  BP: 132/68 (09 Oct 2023 05:36) (115/61 - 134/80)  BP(mean): --  RR: 16 (09 Oct 2023 05:36) (16 - 17)  SpO2: 100% (09 Oct 2023 05:36) (98% - 100%)    Parameters below as of 09 Oct 2023 05:36  Patient On (Oxygen Delivery Method): room air        GENERAL: NAD, lying in bed comfortably  HEAD:  Atraumatic, Normocephalic  EYES: EOMI, PERRLA, conjunctiva and sclera clear  ENT: Moist mucous membranes  NECK: Supple, No JVD  CHEST/LUNG: Clear to auscultation bilaterally; No rales, rhonchi, wheezing, or rubs. Unlabored respirations  HEART: Regular rate and rhythm; No murmurs, rubs, or gallops'   ABDOMEN: BSx4; Soft, nontender, nondistended  EXTREMITIES:  2+ Peripheral Pulses, brisk capillary refill. No clubbing, cyanosis; +b/l peripheral edema  NERVOUS SYSTEM:  A&Ox3, no focal deficits   SKIN: No rashes or lesions  Psych: Normal speech, normal behavior, normal affect    LABS:                        9.0    3.29  )-----------( 172      ( 08 Oct 2023 07:14 )             27.6     10-08    142  |  113<H>  |  17  ----------------------------<  76  3.5   |  17<L>  |  0.98    Ca    7.8<L>      08 Oct 2023 07:14  Phos  1.7     10-08  Mg     1.80     10-08            Urinalysis Basic - ( 08 Oct 2023 07:14 )    Color: x / Appearance: x / SG: x / pH: x  Gluc: 76 mg/dL / Ketone: x  / Bili: x / Urobili: x   Blood: x / Protein: x / Nitrite: x   Leuk Esterase: x / RBC: x / WBC x   Sq Epi: x / Non Sq Epi: x / Bacteria: x        Culture - Stool (collected 06 Oct 2023 12:25)  Source: .Stool Feces  Final Report (08 Oct 2023 15:26):    No enteric pathogens isolated.    (Stool culture examined for Salmonella,    Shigella, Campylobacter, Aeromonas, Plesiomonas,    Vibrio, E.coli O157 and Yersinia)        RADIOLOGY & ADDITIONAL TESTS:  Results Reviewed:   Imaging Personally Reviewed:  Electrocardiogram Personally Reviewed:    COORDINATION OF CARE:  Care Discussed with Consultants/Other Providers [Y/N]:  Prior or Outpatient Records Reviewed [Y/N]:

## 2023-10-09 NOTE — DISCHARGE NOTE NURSING/CASE MANAGEMENT/SOCIAL WORK - PATIENT PORTAL LINK FT
You can access the FollowMyHealth Patient Portal offered by [facility] by registering at the following website: [email or web address]. By joining [facility]’s FollowMyHealth portal, you will also be able to view your health information using other applications (apps) compatible with our system.

## 2023-10-09 NOTE — PROGRESS NOTE ADULT - TIME BILLING
Time spent for extensive review of the physical chart, electronic health record, and documentation to obtain collateral information including but not limited to:    - Current inpatient records (ED, H&P, primary team, and consultants if applicable)   - Inpatient values/results (biomarkers, immunoassays, imaging, and microbiology results)   - Current or proposed treatment plans   - Pharmacotherapy review   Time spent for counseling and education with patient  Time spent discussing and coordinating care with primary team and interdisciplinary staff and floor staff

## 2023-10-09 NOTE — DISCHARGE NOTE PROVIDER - HOSPITAL COURSE
10/6: Admit for diarrhea/enterocolitis + nongap mt acidosis / yen; advanced diet, infectious workup sent  10/7: still some diarrhea intermittently, yen resolved  10/8: c. diff negative, contact d/kirk. Echo expedited. No diarrhea since yesterday morning.Pt would like to leave tomorrow if no further w/u. Patient was admitted for diarrhea with NAGMA and CARYN likely 2/2 diarrhea. CTAP showed non specific enterocolitis without any additional findings. Stool studies including C.diff returned negative. CARYN eventually resolved with IVF resuscitation and diarrhea self resolved. Patient was evaluated by Heme/Onc due to concern for cancer treatment induced diarrhea. TTE was ordered due to concern for HF in setting of b/l pitting edema. TTE returned unremarkable with normal EF. She was eventually deemed medically optimized with outpatient follow up.

## 2023-10-09 NOTE — PROGRESS NOTE ADULT - NUTRITIONAL ASSESSMENT
This patient has been assessed with a concern for Malnutrition and has been determined to have a diagnosis/diagnoses of Severe protein-calorie malnutrition and Underweight (BMI < 19).    This patient is being managed with:   Diet Regular-  Entered: Oct  6 2023  8:56AM  

## 2023-10-09 NOTE — DISCHARGE NOTE PROVIDER - NSDCFUSCHEDAPPT_GEN_ALL_CORE_FT
Gray Persaud  [facility]  [facility]  Scheduled Appointment: 10/17/2023    [facility]  [facility]  Scheduled Appointment: 10/17/2023    Gladis Rosario  [facility]  GERIATRICS [address] [place] [address]   Scheduled Appointment: 12/28/2023

## 2023-10-09 NOTE — PROGRESS NOTE ADULT - PROBLEM SELECTOR PLAN 4
- C/w home hydroxychloroquine  - Of note, pt was on prednisone 2.5 mg every other day for years, was stopped ~1 week prior to onset of diarrhea
- Continue Mirtazapine 7.5mg QHS   - Encourage PO intake as tolerated.

## 2023-10-09 NOTE — PROGRESS NOTE ADULT - ASSESSMENT
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 Admitted for medication related diarrhea (Tagrisso vs. prednisone d/c'ing) 2/2 enterocolitis, c/b non-gap metabolic acidosis / CARYN.  
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 ED with diarrhea, found to have possible SBO on CT - making BMs/passing gas. Admitted for medication related diarrhea (Tagrisso vs. prednisone d/c'ing) 2/2 enterocolitis, c/b non-gap metabolic acidosis / CARYN.  
82F with HTN, RA, HLD, stage IV lung adenocarcinoma on Tagrisso, who presents with 2 weeks of diarrhea, and nausea/vomiting.   Palliative Care consulted for complex decision making  related to goals of care discussions in the setting of advanced illness  
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 Admitted for medication related diarrhea (Tagrisso vs. prednisone d/c'ing) 2/2 enterocolitis, c/b non-gap metabolic acidosis / CARYN.

## 2023-10-09 NOTE — PROGRESS NOTE ADULT - SUBJECTIVE AND OBJECTIVE BOX
Date of Service  : 10/9/ 2023     SUBJECTIVE AND OBJECTIVE:  Patient seen and examined at bedside. Patient reports feeling better; diarrhea improving.   No further nausea; tolerated breakfast this AM    INTERVAL HPI/OVERNIGHT EVENTS:  C diff negative.     Allergies    No Known Allergies    Intolerances    MEDICATIONS  (STANDING):  apixaban 2.5 milliGRAM(s) Oral two times a day  atorvastatin 20 milliGRAM(s) Oral at bedtime  famotidine    Tablet 20 milliGRAM(s) Oral daily  hydroxychloroquine 200 milliGRAM(s) Oral daily  influenza  Vaccine (HIGH DOSE) 0.7 milliLiter(s) IntraMuscular once  lactated ringers. 500 milliLiter(s) (75 mL/Hr) IV Continuous <Continuous>  lactated ringers. 1000 milliLiter(s) (100 mL/Hr) IV Continuous <Continuous>  mirtazapine 7.5 milliGRAM(s) Oral at bedtime    MEDICATIONS  (PRN):  acetaminophen     Tablet .. 650 milliGRAM(s) Oral every 6 hours PRN Temp greater or equal to 38C (100.4F), Mild Pain (1 - 3)  melatonin 3 milliGRAM(s) Oral at bedtime PRN Insomnia      ITEMS UNCHECKED ARE NOT PRESENT    PRESENT SYMPTOMS: [ ]Unable to self-report due to altered mental status- see [ ] CPOT [ ] PAINADS [ ] RDOS  Source if other than patient:  [ ]Family   [ ]Team     Pain:  [ ]yes [ x]no  QOL impact -   Location -                    Aggravating factors -  Quality -  Radiation -  Timing-  Severity (0-10 scale):  Minimal acceptable level / Pain Goal (0-10 scale):     Dyspnea:                           [ ]Mild [ ]Moderate [ ]Severe  Anxiety:                             [ ]Mild [ ]Moderate [ ]Severe  Agitation:                          [ ]Mild [ ]Moderate [ ]Severe  Fatigue:                             [ ]Mild [ ]Moderate [ ]Severe  Nausea:                             [ ]Mild [ ]Moderate [ ]Severe  Loss of appetite:              [ ]Mild [ ]Moderate [ ]Severe  Constipation:                   [ ]Mild [ ]Moderate [ ]Severe  Diarrhea:                          [ ]Mild [ ]Moderate [ ]Severe    CPOT:    https://www.[facility].org/getattachment/[email or web address]/Critical-Care-Pain-Observation-Tool-(CPOT)    PCSSQ[Palliative Care Spiritual Screening Question]   Severity (0-10):  Score of 4 or > indicate consideration of Chaplaincy referral.  Chaplaincy Referral: [ ] yes [ ] refused [ ] following [x ] deferred    Caregiver [place]? : [ ] yes [ ] no [ ] Declined [x ] Deferred              Social work referral [ ] Patient & Family Centered Care Referral [ ]     Anticipatory Grief present?:  [ ] yes [ ] no  [ x] Deferred                  Social work referral [ ] Chaplaincy Referral[ ]    Other Symptoms:  [ x]All other review of systems negative     PHYSICAL EXAM:  Vital Signs Last 24 Hrs  T(C): 36.6 (09 Oct 2023 14:23), Max: 37.1 (08 Oct 2023 21:00)  T(F): 97.9 (09 Oct 2023 14:23), Max: 98.7 (08 Oct 2023 21:00)  HR: 76 (09 Oct 2023 14:23) (73 - 79)  BP: 122/67 (09 Oct 2023 14:23) (115/61 - 132/68)  BP(mean): --  RR: 16 (09 Oct 2023 14:23) (16 - 17)  SpO2: 100% (09 Oct 2023 14:23) (100% - 100%)    Parameters below as of 09 Oct 2023 14:23  Patient On (Oxygen Delivery Method): room air         I&O's Summary     GENERAL:  [ x]Alert  [x ]Oriented x3   [ ]Lethargic  [ ]Cachexia  [ ]Unarousable  [ x]Verbal  [ ]Non-Verbal  [ x] No Distress  Behavioral:   [ ] Anxiety  [ ] Delirium [ ] Agitation [x ] Calm  [ ] Other  HEENT:  [ x]Normal  [ ] Temporal Wasting  [ ]Dry mouth   [ ]ET Tube/Trach  [ ]Oral lesions  [ ] Mucositis  PULMONARY:   [x ]Clear [ ]Tachypnea  [ ]Audible excessive secretions   [ ]Rhonchi        [ ]Right [ ]Left [ ]Bilateral  [ ]Crackles        [ ]Right [ ]Left [ ]Bilateral  [ ]Wheezing     [ ]Right [ ]Left [ ]Bilateral  [ ]Diminished breath sounds [ ]right [ ]left [ ]bilateral  CARDIOVASCULAR:    [x ]Regular [ ]Irregular [ ]Tachy  [ ]Primo [ ]Murmur [ ]Other  GASTROINTESTINAL:  [x ]Soft  [ ]Distended   [ ]+BS  [x ]Non tender [ ]Tender  [ ]PEG [ ]OGT/ NGT  Last BM: yesterday per patient   GENITOURINARY:  [x ]Normal [ ] Incontinent   [ ]Oliguria/Anuria   [ ]De Oliveira  MUSCULOSKELETAL:   [ x]Normal   [ ]Weakness  [ ]Bed/Wheelchair bound [ ]Edema  [  ] amputation  [  ] contraction  NEUROLOGIC:   [ x]No focal deficits  [ ]Cognitive impairment  [ ]Dysphagia [ ]Dysarthria [ ]Paresis [ ]Other   SKIN: See Nursing Skin Assessment for further details  [ x]Normal    [ ]Rash  [ ]Pressure ulcer(s)       Present on admission [ ]y [ ]n   [  ]  Wound    [  ] hyperpigmentation    CRITICAL CARE:  [ ]Shock Present  [ ]Septic [ ]Cardiogenic [ ]Neurologic [ ]Hypovolemic  [ ]Vasopressors [ ]Inotropes  [ ]Respiratory failure present [ ]Mechanical Ventilation [ ]Non-invasive ventilatory support [ ]High-Flow   [ ]Acute  [ ]Chronic [ ]Hypoxic  [ ]Hypercarbic [ ]Other  [ ]Other organ failure     LABS:  reviewed                         8.7    3.16  )-----------( 175      ( 09 Oct 2023 07:14 )             26.3   10-09    143  |  113<H>  |  18  ----------------------------<  81  4.6   |  21<L>  |  1.07    Ca    7.7<L>      09 Oct 2023 07:14  Phos  2.3     10-09  Mg     1.60     10-09      Urinalysis Basic - ( 09 Oct 2023 07:14 )    Color: x / Appearance: x / SG: x / pH: x  Gluc: 81 mg/dL / Ketone: x  / Bili: x / Urobili: x   Blood: x / Protein: x / Nitrite: x   Leuk Esterase: x / RBC: x / WBC x   Sq Epi: x / Non Sq Epi: x / Bacteria: x      CAPILLARY BLOOD GLUCOSE      RADIOLOGY & ADDITIONAL STUDIES: reviewed     Protein Calorie Malnutrition Present: [ ]mild [ ]moderate [ ]severe [ ]underweight [ ]morbid obesity  https://www.andeal.org/vault/0060/web/files/ONC/Table_Clinical%20Characteristics%20to%20Document%20Malnutrition-White%20JV%20et%20al%202012.pdf    Height (cm): 154.9 (10-06-23 @ 02:34), 152.4 (07-25-23 @ 12:00)  Weight (kg): 42.8 (10-06-23 @ 02:34), 41.1454 (10-03-23 @ 12:00)  BMI (kg/m2): 17.8 (10-06-23 @ 02:34), 17.7 (10-03-23 @ 12:00)    [ ]PPSV2 < or = 30%  [ ]significant weight loss [ ]poor nutritional intake [ ]anasarca   [ ]Artificial Nutrition    REFERRALS:   [ ]Chaplaincy  [ ]Hospice  [ ]Child Life  [ ]Social Work  [x ]Case management [ ]Holistic Therapy

## 2023-10-16 PROBLEM — C34.90 MALIGNANT NEOPLASM OF UNSPECIFIED PART OF UNSPECIFIED BRONCHUS OR LUNG: Chronic | Status: ACTIVE | Noted: 2023-01-01

## 2023-11-02 PROBLEM — N18.32 STAGE 3B CHRONIC KIDNEY DISEASE: Status: ACTIVE | Noted: 2023-01-01

## 2023-12-22 PROBLEM — L29.9 PRURITUS: Status: ACTIVE | Noted: 2022-12-29

## 2023-12-22 NOTE — HISTORY OF PRESENT ILLNESS
[FreeTextEntry1] : 82 yo female with lung ca on tagrisso who recent course was complicated by diarrhea. Pt was admitted to [facility] , hydrated, evaluated and tagrisso held. Pt now better. Dose was lowered. Pt with better appetite, eating a bit better but not very much.  Pt still fatigued No falls. Edema improved acc to patient. Movoing bowels.  Sleeping better too. [No falls in past year] : Patient reported no falls in the past year [Completely Independent] : Completely independent. [0] : 2) Feeling down, depressed, or hopeless: Not at all (0) [PHQ-2 Negative - No further assessment needed] : PHQ-2 Negative - No further assessment needed [[id number]] : 0

## 2023-12-22 NOTE — ASSESSMENT
[FreeTextEntry1] : 4 Ms: Meds - No changes in medications made at this time. Mobility - No falls, ambulating with no devices to assist Goals of Care: Care plan unchaged. Goals are to continue with treatment What matters most - family Mentation - intact, denies depression. Language may be a barrier in future

## 2023-12-22 NOTE — REVIEW OF SYSTEMS
[Feeling Tired] : feeling tired [As Noted in HPI] : as noted in HPI [Abdominal Pain] : no abdominal pain [Vomiting] : no vomiting [Constipation] : no constipation [Negative] : Heme/Lymph

## 2023-12-22 NOTE — PHYSICAL EXAM

## 2024-01-01 ENCOUNTER — APPOINTMENT (OUTPATIENT)
Dept: HEMATOLOGY ONCOLOGY | Facility: CLINIC | Age: 84
End: 2024-01-01
Payer: MEDICARE

## 2024-01-01 ENCOUNTER — OUTPATIENT (OUTPATIENT)
Dept: OUTPATIENT SERVICES | Facility: HOSPITAL | Age: 84
LOS: 1 days | Discharge: HOME | End: 2024-01-01
Payer: MEDICARE

## 2024-01-01 ENCOUNTER — RESULT REVIEW (OUTPATIENT)
Age: 84
End: 2024-01-01

## 2024-01-01 ENCOUNTER — TRANSCRIPTION ENCOUNTER (OUTPATIENT)
Age: 84
End: 2024-01-01

## 2024-01-01 ENCOUNTER — NON-APPOINTMENT (OUTPATIENT)
Age: 84
End: 2024-01-01

## 2024-01-01 ENCOUNTER — INPATIENT (INPATIENT)
Facility: HOSPITAL | Age: 84
LOS: 11 days | End: 2024-07-02
Attending: HOSPITALIST | Admitting: HOSPITALIST
Payer: MEDICARE

## 2024-01-01 ENCOUNTER — APPOINTMENT (OUTPATIENT)
Dept: INFUSION THERAPY | Facility: HOSPITAL | Age: 84
End: 2024-01-01

## 2024-01-01 ENCOUNTER — APPOINTMENT (OUTPATIENT)
Dept: GERIATRICS | Facility: CLINIC | Age: 84
End: 2024-01-01
Payer: MEDICARE

## 2024-01-01 ENCOUNTER — APPOINTMENT (OUTPATIENT)
Dept: RADIOLOGY | Facility: IMAGING CENTER | Age: 84
End: 2024-01-01
Payer: MEDICARE

## 2024-01-01 ENCOUNTER — OUTPATIENT (OUTPATIENT)
Dept: OUTPATIENT SERVICES | Facility: HOSPITAL | Age: 84
LOS: 1 days | End: 2024-01-01
Payer: MEDICARE

## 2024-01-01 ENCOUNTER — EMERGENCY (EMERGENCY)
Facility: HOSPITAL | Age: 84
LOS: 1 days | Discharge: HOME | End: 2024-01-01
Attending: STUDENT IN AN ORGANIZED HEALTH CARE EDUCATION/TRAINING PROGRAM | Admitting: STUDENT IN AN ORGANIZED HEALTH CARE EDUCATION/TRAINING PROGRAM
Payer: MEDICARE

## 2024-01-01 ENCOUNTER — LABORATORY RESULT (OUTPATIENT)
Age: 84
End: 2024-01-01

## 2024-01-01 ENCOUNTER — APPOINTMENT (OUTPATIENT)
Dept: PULMONOLOGY | Facility: CLINIC | Age: 84
End: 2024-01-01
Payer: MEDICARE

## 2024-01-01 ENCOUNTER — OUTPATIENT (OUTPATIENT)
Dept: OUTPATIENT SERVICES | Facility: HOSPITAL | Age: 84
LOS: 1 days | End: 2024-01-01

## 2024-01-01 ENCOUNTER — APPOINTMENT (OUTPATIENT)
Dept: PULMONOLOGY | Facility: HOSPITAL | Age: 84
End: 2024-01-01
Payer: MEDICARE

## 2024-01-01 ENCOUNTER — APPOINTMENT (OUTPATIENT)
Dept: MRI IMAGING | Facility: CLINIC | Age: 84
End: 2024-01-01
Payer: MEDICARE

## 2024-01-01 ENCOUNTER — APPOINTMENT (OUTPATIENT)
Dept: PULMONOLOGY | Facility: CLINIC | Age: 84
End: 2024-01-01

## 2024-01-01 ENCOUNTER — APPOINTMENT (OUTPATIENT)
Dept: CT IMAGING | Facility: CLINIC | Age: 84
End: 2024-01-01
Payer: MEDICARE

## 2024-01-01 ENCOUNTER — APPOINTMENT (OUTPATIENT)
Dept: PHYSICAL MEDICINE AND REHAB | Facility: CLINIC | Age: 84
End: 2024-01-01
Payer: MEDICARE

## 2024-01-01 ENCOUNTER — OUTPATIENT (OUTPATIENT)
Dept: OUTPATIENT SERVICES | Facility: HOSPITAL | Age: 84
LOS: 1 days | Discharge: HOME | End: 2024-01-01

## 2024-01-01 ENCOUNTER — APPOINTMENT (OUTPATIENT)
Dept: HEMATOLOGY ONCOLOGY | Facility: CLINIC | Age: 84
End: 2024-01-01

## 2024-01-01 ENCOUNTER — APPOINTMENT (OUTPATIENT)
Dept: OTOLARYNGOLOGY | Facility: CLINIC | Age: 84
End: 2024-01-01

## 2024-01-01 ENCOUNTER — APPOINTMENT (OUTPATIENT)
Dept: PULMONOLOGY | Facility: HOSPITAL | Age: 84
End: 2024-01-01

## 2024-01-01 ENCOUNTER — RX RENEWAL (OUTPATIENT)
Age: 84
End: 2024-01-01

## 2024-01-01 VITALS
HEART RATE: 82 BPM | DIASTOLIC BLOOD PRESSURE: 74 MMHG | TEMPERATURE: 97.8 F | SYSTOLIC BLOOD PRESSURE: 108 MMHG | BODY MASS INDEX: 17.14 KG/M2 | HEIGHT: 61 IN | WEIGHT: 90.8 LBS | OXYGEN SATURATION: 96 % | RESPIRATION RATE: 16 BRPM

## 2024-01-01 VITALS
TEMPERATURE: 97 F | OXYGEN SATURATION: 97 % | SYSTOLIC BLOOD PRESSURE: 117 MMHG | RESPIRATION RATE: 16 BRPM | HEIGHT: 61 IN | HEART RATE: 64 BPM | DIASTOLIC BLOOD PRESSURE: 54 MMHG | WEIGHT: 89.95 LBS

## 2024-01-01 VITALS
WEIGHT: 90 LBS | DIASTOLIC BLOOD PRESSURE: 50 MMHG | HEART RATE: 93 BPM | HEIGHT: 61 IN | SYSTOLIC BLOOD PRESSURE: 74 MMHG | OXYGEN SATURATION: 97 % | BODY MASS INDEX: 16.99 KG/M2

## 2024-01-01 VITALS
HEART RATE: 86 BPM | TEMPERATURE: 98 F | HEIGHT: 60 IN | DIASTOLIC BLOOD PRESSURE: 72 MMHG | SYSTOLIC BLOOD PRESSURE: 103 MMHG | WEIGHT: 85.98 LBS | OXYGEN SATURATION: 99 % | RESPIRATION RATE: 16 BRPM

## 2024-01-01 VITALS
SYSTOLIC BLOOD PRESSURE: 143 MMHG | OXYGEN SATURATION: 95 % | BODY MASS INDEX: 17.48 KG/M2 | TEMPERATURE: 97.5 F | WEIGHT: 92.59 LBS | DIASTOLIC BLOOD PRESSURE: 83 MMHG | RESPIRATION RATE: 16 BRPM | HEART RATE: 86 BPM

## 2024-01-01 VITALS
DIASTOLIC BLOOD PRESSURE: 70 MMHG | HEIGHT: 60 IN | TEMPERATURE: 97.8 F | HEART RATE: 79 BPM | OXYGEN SATURATION: 97 % | BODY MASS INDEX: 16.69 KG/M2 | WEIGHT: 85 LBS | SYSTOLIC BLOOD PRESSURE: 114 MMHG | RESPIRATION RATE: 16 BRPM

## 2024-01-01 VITALS
TEMPERATURE: 98 F | HEIGHT: 61 IN | DIASTOLIC BLOOD PRESSURE: 82 MMHG | HEART RATE: 75 BPM | SYSTOLIC BLOOD PRESSURE: 130 MMHG | RESPIRATION RATE: 16 BRPM | OXYGEN SATURATION: 96 % | WEIGHT: 87.08 LBS

## 2024-01-01 VITALS
WEIGHT: 89.8 LBS | HEIGHT: 60 IN | DIASTOLIC BLOOD PRESSURE: 82 MMHG | OXYGEN SATURATION: 94 % | RESPIRATION RATE: 16 BRPM | TEMPERATURE: 97.8 F | HEART RATE: 91 BPM | BODY MASS INDEX: 17.63 KG/M2 | SYSTOLIC BLOOD PRESSURE: 131 MMHG

## 2024-01-01 VITALS
SYSTOLIC BLOOD PRESSURE: 126 MMHG | OXYGEN SATURATION: 96 % | TEMPERATURE: 98 F | HEIGHT: 60 IN | WEIGHT: 85.98 LBS | DIASTOLIC BLOOD PRESSURE: 77 MMHG | HEART RATE: 78 BPM | RESPIRATION RATE: 18 BRPM

## 2024-01-01 VITALS
OXYGEN SATURATION: 100 % | RESPIRATION RATE: 18 BRPM | DIASTOLIC BLOOD PRESSURE: 80 MMHG | HEART RATE: 66 BPM | TEMPERATURE: 98 F | SYSTOLIC BLOOD PRESSURE: 147 MMHG

## 2024-01-01 VITALS
RESPIRATION RATE: 16 BRPM | HEIGHT: 60 IN | OXYGEN SATURATION: 96 % | TEMPERATURE: 97.9 F | DIASTOLIC BLOOD PRESSURE: 71 MMHG | SYSTOLIC BLOOD PRESSURE: 98 MMHG | BODY MASS INDEX: 16.75 KG/M2 | HEART RATE: 72 BPM | WEIGHT: 85.32 LBS

## 2024-01-01 VITALS
OXYGEN SATURATION: 99 % | DIASTOLIC BLOOD PRESSURE: 66 MMHG | RESPIRATION RATE: 16 BRPM | SYSTOLIC BLOOD PRESSURE: 145 MMHG | HEART RATE: 71 BPM

## 2024-01-01 VITALS
SYSTOLIC BLOOD PRESSURE: 143 MMHG | HEART RATE: 64 BPM | DIASTOLIC BLOOD PRESSURE: 64 MMHG | RESPIRATION RATE: 16 BRPM | OXYGEN SATURATION: 98 %

## 2024-01-01 VITALS
HEIGHT: 60 IN | DIASTOLIC BLOOD PRESSURE: 77 MMHG | OXYGEN SATURATION: 97 % | HEART RATE: 78 BPM | RESPIRATION RATE: 17 BRPM | WEIGHT: 85.03 LBS | SYSTOLIC BLOOD PRESSURE: 121 MMHG | BODY MASS INDEX: 16.69 KG/M2

## 2024-01-01 VITALS
HEIGHT: 61 IN | OXYGEN SATURATION: 99 % | HEART RATE: 76 BPM | DIASTOLIC BLOOD PRESSURE: 61 MMHG | SYSTOLIC BLOOD PRESSURE: 97 MMHG | WEIGHT: 89.95 LBS | RESPIRATION RATE: 14 BRPM

## 2024-01-01 VITALS
WEIGHT: 95.5 LBS | HEIGHT: 61 IN | TEMPERATURE: 96.3 F | BODY MASS INDEX: 18.03 KG/M2 | HEART RATE: 80 BPM | SYSTOLIC BLOOD PRESSURE: 138 MMHG | DIASTOLIC BLOOD PRESSURE: 79 MMHG

## 2024-01-01 VITALS — RESPIRATION RATE: 18 BRPM

## 2024-01-01 VITALS
OXYGEN SATURATION: 98 % | BODY MASS INDEX: 17.91 KG/M2 | DIASTOLIC BLOOD PRESSURE: 77 MMHG | WEIGHT: 94.8 LBS | HEART RATE: 73 BPM | TEMPERATURE: 97.6 F | SYSTOLIC BLOOD PRESSURE: 116 MMHG | RESPIRATION RATE: 14 BRPM

## 2024-01-01 VITALS
HEART RATE: 104 BPM | DIASTOLIC BLOOD PRESSURE: 86 MMHG | OXYGEN SATURATION: 96 % | BODY MASS INDEX: 18.69 KG/M2 | HEIGHT: 61 IN | TEMPERATURE: 97.3 F | RESPIRATION RATE: 16 BRPM | SYSTOLIC BLOOD PRESSURE: 135 MMHG | WEIGHT: 99 LBS

## 2024-01-01 VITALS
RESPIRATION RATE: 17 BRPM | SYSTOLIC BLOOD PRESSURE: 139 MMHG | DIASTOLIC BLOOD PRESSURE: 84 MMHG | OXYGEN SATURATION: 97 % | BODY MASS INDEX: 17.67 KG/M2 | HEART RATE: 81 BPM | HEIGHT: 60 IN | WEIGHT: 90 LBS

## 2024-01-01 VITALS
BODY MASS INDEX: 16.24 KG/M2 | HEART RATE: 98 BPM | SYSTOLIC BLOOD PRESSURE: 91 MMHG | HEIGHT: 61 IN | TEMPERATURE: 97.2 F | DIASTOLIC BLOOD PRESSURE: 59 MMHG | OXYGEN SATURATION: 92 % | WEIGHT: 86 LBS

## 2024-01-01 VITALS
TEMPERATURE: 98 F | OXYGEN SATURATION: 99 % | HEART RATE: 68 BPM | DIASTOLIC BLOOD PRESSURE: 72 MMHG | RESPIRATION RATE: 16 BRPM | SYSTOLIC BLOOD PRESSURE: 122 MMHG

## 2024-01-01 VITALS
BODY MASS INDEX: 17.01 KG/M2 | DIASTOLIC BLOOD PRESSURE: 82 MMHG | RESPIRATION RATE: 16 BRPM | TEMPERATURE: 96.8 F | HEART RATE: 86 BPM | WEIGHT: 87.06 LBS | SYSTOLIC BLOOD PRESSURE: 134 MMHG | OXYGEN SATURATION: 98 %

## 2024-01-01 VITALS
BODY MASS INDEX: 17.58 KG/M2 | WEIGHT: 93.03 LBS | OXYGEN SATURATION: 95 % | DIASTOLIC BLOOD PRESSURE: 81 MMHG | SYSTOLIC BLOOD PRESSURE: 118 MMHG | TEMPERATURE: 98.1 F | HEART RATE: 87 BPM | RESPIRATION RATE: 16 BRPM

## 2024-01-01 VITALS
DIASTOLIC BLOOD PRESSURE: 77 MMHG | OXYGEN SATURATION: 99 % | HEART RATE: 74 BPM | SYSTOLIC BLOOD PRESSURE: 151 MMHG | RESPIRATION RATE: 17 BRPM

## 2024-01-01 VITALS — SYSTOLIC BLOOD PRESSURE: 94 MMHG | DIASTOLIC BLOOD PRESSURE: 60 MMHG

## 2024-01-01 VITALS
RESPIRATION RATE: 16 BRPM | BODY MASS INDEX: 16.54 KG/M2 | WEIGHT: 87.52 LBS | OXYGEN SATURATION: 97 % | TEMPERATURE: 97.8 F | HEART RATE: 72 BPM | DIASTOLIC BLOOD PRESSURE: 66 MMHG | SYSTOLIC BLOOD PRESSURE: 101 MMHG

## 2024-01-01 VITALS — SYSTOLIC BLOOD PRESSURE: 100 MMHG | DIASTOLIC BLOOD PRESSURE: 60 MMHG

## 2024-01-01 VITALS
SYSTOLIC BLOOD PRESSURE: 106 MMHG | WEIGHT: 86 LBS | BODY MASS INDEX: 16.8 KG/M2 | DIASTOLIC BLOOD PRESSURE: 59 MMHG | RESPIRATION RATE: 15 BRPM | HEART RATE: 70 BPM | TEMPERATURE: 97.3 F

## 2024-01-01 VITALS
HEART RATE: 82 BPM | WEIGHT: 89.07 LBS | SYSTOLIC BLOOD PRESSURE: 126 MMHG | OXYGEN SATURATION: 98 % | DIASTOLIC BLOOD PRESSURE: 73 MMHG | TEMPERATURE: 97 F | RESPIRATION RATE: 16 BRPM | HEIGHT: 61 IN

## 2024-01-01 VITALS
SYSTOLIC BLOOD PRESSURE: 96 MMHG | DIASTOLIC BLOOD PRESSURE: 61 MMHG | HEIGHT: 61 IN | HEART RATE: 95 BPM | WEIGHT: 85.98 LBS | RESPIRATION RATE: 18 BRPM | OXYGEN SATURATION: 68 % | TEMPERATURE: 98 F

## 2024-01-01 VITALS — DIASTOLIC BLOOD PRESSURE: 62 MMHG | SYSTOLIC BLOOD PRESSURE: 92 MMHG

## 2024-01-01 DIAGNOSIS — Z98.49 CATARACT EXTRACTION STATUS, UNSPECIFIED EYE: Chronic | ICD-10-CM

## 2024-01-01 DIAGNOSIS — T45.1X5A DRUG-INDUCED POLYNEUROPATHY: ICD-10-CM

## 2024-01-01 DIAGNOSIS — J96.01 ACUTE RESPIRATORY FAILURE WITH HYPOXIA: ICD-10-CM

## 2024-01-01 DIAGNOSIS — Z90.89 ACQUIRED ABSENCE OF OTHER ORGANS: Chronic | ICD-10-CM

## 2024-01-01 DIAGNOSIS — G47.00 INSOMNIA, UNSPECIFIED: ICD-10-CM

## 2024-01-01 DIAGNOSIS — Z51.5 ENCOUNTER FOR PALLIATIVE CARE: ICD-10-CM

## 2024-01-01 DIAGNOSIS — R19.7 DIARRHEA, UNSPECIFIED: ICD-10-CM

## 2024-01-01 DIAGNOSIS — Z71.89 OTHER SPECIFIED COUNSELING: ICD-10-CM

## 2024-01-01 DIAGNOSIS — Z00.00 ENCOUNTER FOR GENERAL ADULT MEDICAL EXAMINATION W/OUT ABNORMAL FINDINGS: ICD-10-CM

## 2024-01-01 DIAGNOSIS — R52 PAIN, UNSPECIFIED: ICD-10-CM

## 2024-01-01 DIAGNOSIS — C34.92 MALIGNANT NEOPLASM OF UNSPECIFIED PART OF LEFT BRONCHUS OR LUNG: ICD-10-CM

## 2024-01-01 DIAGNOSIS — R63.0 ANOREXIA: ICD-10-CM

## 2024-01-01 DIAGNOSIS — D64.9 ANEMIA, UNSPECIFIED: ICD-10-CM

## 2024-01-01 DIAGNOSIS — R53.1 WEAKNESS: ICD-10-CM

## 2024-01-01 DIAGNOSIS — R26.9 UNSPECIFIED ABNORMALITIES OF GAIT AND MOBILITY: ICD-10-CM

## 2024-01-01 DIAGNOSIS — J90 PLEURAL EFFUSION, NOT ELSEWHERE CLASSIFIED: ICD-10-CM

## 2024-01-01 DIAGNOSIS — J91.0 MALIGNANT PLEURAL EFFUSION: ICD-10-CM

## 2024-01-01 DIAGNOSIS — R11.0 NAUSEA: ICD-10-CM

## 2024-01-01 DIAGNOSIS — R60.0 LOCALIZED EDEMA: ICD-10-CM

## 2024-01-01 DIAGNOSIS — Z86.718 PERSONAL HISTORY OF OTHER VENOUS THROMBOSIS AND EMBOLISM: ICD-10-CM

## 2024-01-01 DIAGNOSIS — F32.A DEPRESSION, UNSPECIFIED: ICD-10-CM

## 2024-01-01 DIAGNOSIS — C34.90 MALIGNANT NEOPLASM OF UNSPECIFIED PART OF UNSPECIFIED BRONCHUS OR LUNG: ICD-10-CM

## 2024-01-01 DIAGNOSIS — M19.011 PRIMARY OSTEOARTHRITIS, RIGHT SHOULDER: ICD-10-CM

## 2024-01-01 DIAGNOSIS — R11.2 NAUSEA WITH VOMITING, UNSPECIFIED: ICD-10-CM

## 2024-01-01 DIAGNOSIS — K52.1 TOXIC GASTROENTERITIS AND COLITIS: ICD-10-CM

## 2024-01-01 DIAGNOSIS — G62.0 DRUG-INDUCED POLYNEUROPATHY: ICD-10-CM

## 2024-01-01 DIAGNOSIS — Z87.39 PERSONAL HISTORY OF OTHER DISEASES OF THE MUSCULOSKELETAL SYSTEM AND CONNECTIVE TISSUE: ICD-10-CM

## 2024-01-01 DIAGNOSIS — R53.83 OTHER FATIGUE: ICD-10-CM

## 2024-01-01 DIAGNOSIS — C78.2 SECONDARY MALIGNANT NEOPLASM OF PLEURA: ICD-10-CM

## 2024-01-01 DIAGNOSIS — C79.51 SECONDARY MALIGNANT NEOPLASM OF BONE: ICD-10-CM

## 2024-01-01 DIAGNOSIS — I10 ESSENTIAL (PRIMARY) HYPERTENSION: ICD-10-CM

## 2024-01-01 DIAGNOSIS — E87.20 ACIDOSIS, UNSPECIFIED: ICD-10-CM

## 2024-01-01 DIAGNOSIS — M06.9 RHEUMATOID ARTHRITIS, UNSPECIFIED: ICD-10-CM

## 2024-01-01 DIAGNOSIS — I26.99 OTHER PULMONARY EMBOLISM W/OUT ACUTE COR PULMONALE: ICD-10-CM

## 2024-01-01 DIAGNOSIS — R60.9 EDEMA, UNSPECIFIED: ICD-10-CM

## 2024-01-01 DIAGNOSIS — Z79.899 OTHER LONG TERM (CURRENT) DRUG THERAPY: ICD-10-CM

## 2024-01-01 DIAGNOSIS — I21.9 ACUTE MYOCARDIAL INFARCTION, UNSPECIFIED: ICD-10-CM

## 2024-01-01 DIAGNOSIS — F41.9 ANXIETY DISORDER, UNSPECIFIED: ICD-10-CM

## 2024-01-01 DIAGNOSIS — R53.81 OTHER MALAISE: ICD-10-CM

## 2024-01-01 DIAGNOSIS — R94.31 ABNORMAL ELECTROCARDIOGRAM [ECG] [EKG]: ICD-10-CM

## 2024-01-01 DIAGNOSIS — N17.9 ACUTE KIDNEY FAILURE, UNSPECIFIED: ICD-10-CM

## 2024-01-01 DIAGNOSIS — E86.0 DEHYDRATION: ICD-10-CM

## 2024-01-01 DIAGNOSIS — R62.7 ADULT FAILURE TO THRIVE: ICD-10-CM

## 2024-01-01 DIAGNOSIS — E87.0 HYPEROSMOLALITY AND HYPERNATREMIA: ICD-10-CM

## 2024-01-01 DIAGNOSIS — M19.012 PRIMARY OSTEOARTHRITIS, RIGHT SHOULDER: ICD-10-CM

## 2024-01-01 DIAGNOSIS — R13.10 DYSPHAGIA, UNSPECIFIED: ICD-10-CM

## 2024-01-01 DIAGNOSIS — Z00.8 ENCOUNTER FOR OTHER GENERAL EXAMINATION: ICD-10-CM

## 2024-01-01 DIAGNOSIS — Z91.89 OTHER SPECIFIED PERSONAL RISK FACTORS, NOT ELSEWHERE CLASSIFIED: ICD-10-CM

## 2024-01-01 DIAGNOSIS — R07.89 OTHER CHEST PAIN: ICD-10-CM

## 2024-01-01 DIAGNOSIS — K59.00 CONSTIPATION, UNSPECIFIED: ICD-10-CM

## 2024-01-01 LAB
-  CLINDAMYCIN: SIGNIFICANT CHANGE UP
-  ERYTHROMYCIN: SIGNIFICANT CHANGE UP
-  GENTAMICIN: SIGNIFICANT CHANGE UP
-  LINEZOLID: SIGNIFICANT CHANGE UP
-  OXACILLIN: SIGNIFICANT CHANGE UP
-  PENICILLIN: SIGNIFICANT CHANGE UP
-  RIFAMPIN: SIGNIFICANT CHANGE UP
-  TETRACYCLINE: SIGNIFICANT CHANGE UP
-  TRIMETHOPRIM/SULFAMETHOXAZOLE: SIGNIFICANT CHANGE UP
-  VANCOMYCIN: SIGNIFICANT CHANGE UP
A1C WITH ESTIMATED AVERAGE GLUCOSE RESULT: 5.2 % — SIGNIFICANT CHANGE UP (ref 4–5.6)
ALBUMIN FLD-MCNC: 2.9 G/DL — SIGNIFICANT CHANGE UP
ALBUMIN SERPL ELPH-MCNC: 2.2 G/DL — LOW (ref 3.3–5)
ALBUMIN SERPL ELPH-MCNC: 2.3 G/DL — LOW (ref 3.3–5)
ALBUMIN SERPL ELPH-MCNC: 2.5 G/DL — LOW (ref 3.3–5)
ALBUMIN SERPL ELPH-MCNC: 2.7 G/DL — LOW (ref 3.3–5)
ALBUMIN SERPL ELPH-MCNC: 2.9 G/DL — LOW (ref 3.3–5)
ALBUMIN SERPL ELPH-MCNC: 3.2 G/DL
ALBUMIN SERPL ELPH-MCNC: 3.2 G/DL
ALBUMIN SERPL ELPH-MCNC: 3.4 G/DL
ALBUMIN SERPL ELPH-MCNC: 3.7 G/DL
ALBUMIN SERPL ELPH-MCNC: 3.8 G/DL
ALBUMIN SERPL ELPH-MCNC: 4 G/DL
ALBUMIN SERPL ELPH-MCNC: 4 G/DL
ALP BLD-CCNC: 103 U/L
ALP BLD-CCNC: 123 U/L
ALP BLD-CCNC: 134 U/L
ALP BLD-CCNC: 166 U/L
ALP BLD-CCNC: 180 U/L
ALP BLD-CCNC: 597 U/L
ALP BLD-CCNC: 96 U/L
ALP SERPL-CCNC: 114 U/L — SIGNIFICANT CHANGE UP (ref 40–120)
ALP SERPL-CCNC: 139 U/L — HIGH (ref 40–120)
ALP SERPL-CCNC: 149 U/L — HIGH (ref 40–120)
ALP SERPL-CCNC: 156 U/L — HIGH (ref 40–120)
ALP SERPL-CCNC: 167 U/L — HIGH (ref 40–120)
ALT FLD-CCNC: 27 U/L — SIGNIFICANT CHANGE UP (ref 4–33)
ALT FLD-CCNC: 33 U/L — SIGNIFICANT CHANGE UP (ref 4–33)
ALT FLD-CCNC: 35 U/L — HIGH (ref 4–33)
ALT FLD-CCNC: 57 U/L — HIGH (ref 4–33)
ALT FLD-CCNC: 58 U/L — HIGH (ref 4–33)
ALT SERPL-CCNC: 17 U/L
ALT SERPL-CCNC: 20 U/L
ALT SERPL-CCNC: 21 U/L
ALT SERPL-CCNC: 23 U/L
ALT SERPL-CCNC: 31 U/L
ALT SERPL-CCNC: 32 U/L
ALT SERPL-CCNC: 33 U/L
ANION GAP SERPL CALC-SCNC: 10 MMOL/L
ANION GAP SERPL CALC-SCNC: 10 MMOL/L — SIGNIFICANT CHANGE UP (ref 7–14)
ANION GAP SERPL CALC-SCNC: 11 MMOL/L
ANION GAP SERPL CALC-SCNC: 11 MMOL/L — SIGNIFICANT CHANGE UP (ref 7–14)
ANION GAP SERPL CALC-SCNC: 13 MMOL/L
ANION GAP SERPL CALC-SCNC: 13 MMOL/L
ANION GAP SERPL CALC-SCNC: 13 MMOL/L — SIGNIFICANT CHANGE UP (ref 7–14)
ANION GAP SERPL CALC-SCNC: 13 MMOL/L — SIGNIFICANT CHANGE UP (ref 7–14)
ANION GAP SERPL CALC-SCNC: 14 MMOL/L
ANION GAP SERPL CALC-SCNC: 15 MMOL/L
ANION GAP SERPL CALC-SCNC: 16 MMOL/L — HIGH (ref 7–14)
ANION GAP SERPL CALC-SCNC: 8 MMOL/L
ANION GAP SERPL CALC-SCNC: 9 MMOL/L
ANION GAP SERPL CALC-SCNC: 9 MMOL/L — SIGNIFICANT CHANGE UP (ref 7–14)
ANION GAP SERPL CALC-SCNC: 9 MMOL/L — SIGNIFICANT CHANGE UP (ref 7–14)
ANISOCYTOSIS BLD QL: SIGNIFICANT CHANGE UP
ANISOCYTOSIS BLD QL: SLIGHT — SIGNIFICANT CHANGE UP
APPEARANCE UR: ABNORMAL
APTT BLD: 29.1 SEC — SIGNIFICANT CHANGE UP (ref 24.5–35.6)
APTT BLD: 33.3 SEC — SIGNIFICANT CHANGE UP (ref 24.5–35.6)
APTT BLD: 36 SEC — HIGH (ref 24.5–35.6)
AST SERPL-CCNC: 21 U/L
AST SERPL-CCNC: 23 U/L
AST SERPL-CCNC: 25 U/L
AST SERPL-CCNC: 27 U/L
AST SERPL-CCNC: 29 U/L
AST SERPL-CCNC: 32 U/L — SIGNIFICANT CHANGE UP (ref 4–32)
AST SERPL-CCNC: 34 U/L
AST SERPL-CCNC: 35 U/L
AST SERPL-CCNC: 38 U/L — HIGH (ref 4–32)
AST SERPL-CCNC: 40 U/L — HIGH (ref 4–32)
AST SERPL-CCNC: 46 U/L — HIGH (ref 4–32)
AST SERPL-CCNC: 73 U/L — HIGH (ref 4–32)
B PERT IGG+IGM PNL SER: ABNORMAL
B PERT IGG+IGM PNL SER: ABNORMAL
B PERT IGG+IGM PNL SER: CLEAR — SIGNIFICANT CHANGE UP
B PERT IGG+IGM PNL SER: CLEAR — SIGNIFICANT CHANGE UP
BACTERIA # UR AUTO: NEGATIVE /HPF — SIGNIFICANT CHANGE UP
BASE EXCESS BLDV CALC-SCNC: -0.7 MMOL/L — SIGNIFICANT CHANGE UP (ref -2–3)
BASE EXCESS BLDV CALC-SCNC: -11.2 MMOL/L — LOW (ref -2–3)
BASE EXCESS BLDV CALC-SCNC: -2 MMOL/L — SIGNIFICANT CHANGE UP (ref -2–3)
BASE EXCESS BLDV CALC-SCNC: -6.6 MMOL/L — LOW (ref -2–3)
BASE EXCESS BLDV CALC-SCNC: -8.3 MMOL/L — LOW (ref -2–3)
BASE EXCESS BLDV CALC-SCNC: -9.2 MMOL/L — LOW (ref -2–3)
BASOPHILS # BLD AUTO: 0 K/UL — SIGNIFICANT CHANGE UP (ref 0–0.2)
BASOPHILS # BLD AUTO: 0.01 K/UL
BASOPHILS # BLD AUTO: 0.02 K/UL — SIGNIFICANT CHANGE UP (ref 0–0.2)
BASOPHILS # BLD AUTO: 0.02 K/UL — SIGNIFICANT CHANGE UP (ref 0–0.2)
BASOPHILS # BLD AUTO: 0.03 K/UL — SIGNIFICANT CHANGE UP (ref 0–0.2)
BASOPHILS # BLD AUTO: 0.05 K/UL — SIGNIFICANT CHANGE UP (ref 0–0.2)
BASOPHILS NFR BLD AUTO: 0 % — SIGNIFICANT CHANGE UP (ref 0–2)
BASOPHILS NFR BLD AUTO: 0.4 %
BASOPHILS NFR BLD AUTO: 0.6 % — SIGNIFICANT CHANGE UP (ref 0–2)
BASOPHILS NFR BLD AUTO: 0.7 % — SIGNIFICANT CHANGE UP (ref 0–2)
BASOPHILS NFR BLD AUTO: 0.9 % — SIGNIFICANT CHANGE UP (ref 0–2)
BASOPHILS NFR BLD AUTO: 0.9 % — SIGNIFICANT CHANGE UP (ref 0–2)
BASOPHILS NFR BLD AUTO: 1 % — SIGNIFICANT CHANGE UP (ref 0–2)
BASOPHILS NFR BLD AUTO: 1 % — SIGNIFICANT CHANGE UP (ref 0–2)
BILIRUB SERPL-MCNC: 0.2 MG/DL
BILIRUB SERPL-MCNC: 0.2 MG/DL — SIGNIFICANT CHANGE UP (ref 0.2–1.2)
BILIRUB SERPL-MCNC: <0.2 MG/DL
BILIRUB SERPL-MCNC: <0.2 MG/DL — SIGNIFICANT CHANGE UP (ref 0.2–1.2)
BILIRUB UR-MCNC: NEGATIVE — SIGNIFICANT CHANGE UP
BLD GP AB SCN SERPL QL: NEGATIVE — SIGNIFICANT CHANGE UP
BLD GP AB SCN SERPL QL: NEGATIVE — SIGNIFICANT CHANGE UP
BLOOD GAS VENOUS COMPREHENSIVE RESULT: SIGNIFICANT CHANGE UP
BUN SERPL-MCNC: 33 MG/DL
BUN SERPL-MCNC: 38 MG/DL
BUN SERPL-MCNC: 39 MG/DL
BUN SERPL-MCNC: 40 MG/DL
BUN SERPL-MCNC: 40 MG/DL — HIGH (ref 7–23)
BUN SERPL-MCNC: 42 MG/DL
BUN SERPL-MCNC: 47 MG/DL
BUN SERPL-MCNC: 47 MG/DL
BUN SERPL-MCNC: 49 MG/DL — HIGH (ref 7–23)
BUN SERPL-MCNC: 54 MG/DL — HIGH (ref 7–23)
BUN SERPL-MCNC: 58 MG/DL
BUN SERPL-MCNC: 62 MG/DL — HIGH (ref 7–23)
BUN SERPL-MCNC: 62 MG/DL — HIGH (ref 7–23)
BUN SERPL-MCNC: 63 MG/DL — HIGH (ref 7–23)
BUN SERPL-MCNC: 67 MG/DL — HIGH (ref 7–23)
BUN SERPL-MCNC: 68 MG/DL — HIGH (ref 7–23)
BUN SERPL-MCNC: 72 MG/DL — HIGH (ref 7–23)
CA-I SERPL-SCNC: 1.16 MMOL/L — SIGNIFICANT CHANGE UP (ref 1.15–1.33)
CA-I SERPL-SCNC: 1.22 MMOL/L — SIGNIFICANT CHANGE UP (ref 1.15–1.33)
CA-I SERPL-SCNC: 1.23 MMOL/L — SIGNIFICANT CHANGE UP (ref 1.15–1.33)
CA-I SERPL-SCNC: 1.26 MMOL/L — SIGNIFICANT CHANGE UP (ref 1.15–1.33)
CALCIUM SERPL-MCNC: 7 MG/DL — LOW (ref 8.4–10.5)
CALCIUM SERPL-MCNC: 7.3 MG/DL — LOW (ref 8.4–10.5)
CALCIUM SERPL-MCNC: 7.6 MG/DL — LOW (ref 8.4–10.5)
CALCIUM SERPL-MCNC: 7.7 MG/DL — LOW (ref 8.4–10.5)
CALCIUM SERPL-MCNC: 7.8 MG/DL — LOW (ref 8.4–10.5)
CALCIUM SERPL-MCNC: 7.8 MG/DL — LOW (ref 8.4–10.5)
CALCIUM SERPL-MCNC: 8 MG/DL
CALCIUM SERPL-MCNC: 8.1 MG/DL
CALCIUM SERPL-MCNC: 8.1 MG/DL — LOW (ref 8.4–10.5)
CALCIUM SERPL-MCNC: 8.3 MG/DL
CALCIUM SERPL-MCNC: 8.3 MG/DL — LOW (ref 8.4–10.5)
CALCIUM SERPL-MCNC: 8.4 MG/DL
CALCIUM SERPL-MCNC: 8.4 MG/DL — SIGNIFICANT CHANGE UP (ref 8.4–10.5)
CALCIUM SERPL-MCNC: 8.8 MG/DL
CALCIUM SERPL-MCNC: 9.1 MG/DL
CALCIUM SERPL-MCNC: 9.3 MG/DL
CALCIUM SERPL-MCNC: 9.3 MG/DL
CAST: 17 /LPF — HIGH (ref 0–4)
CEA SERPL-MCNC: 110 NG/ML
CEA SERPL-MCNC: 48.2 NG/ML
CEA SERPL-MCNC: 58 NG/ML
CEA SERPL-MCNC: 58 NG/ML
CEA SERPL-MCNC: 77.2 NG/ML
CEA SERPL-MCNC: 89.3 NG/ML
CHLORIDE BLDV-SCNC: 114 MMOL/L — HIGH (ref 96–108)
CHLORIDE BLDV-SCNC: 115 MMOL/L — HIGH (ref 96–108)
CHLORIDE BLDV-SCNC: 116 MMOL/L — HIGH (ref 96–108)
CHLORIDE BLDV-SCNC: 118 MMOL/L — HIGH (ref 96–108)
CHLORIDE SERPL-SCNC: 106 MMOL/L
CHLORIDE SERPL-SCNC: 106 MMOL/L
CHLORIDE SERPL-SCNC: 108 MMOL/L
CHLORIDE SERPL-SCNC: 109 MMOL/L
CHLORIDE SERPL-SCNC: 109 MMOL/L
CHLORIDE SERPL-SCNC: 110 MMOL/L
CHLORIDE SERPL-SCNC: 111 MMOL/L — HIGH (ref 98–107)
CHLORIDE SERPL-SCNC: 113 MMOL/L — HIGH (ref 98–107)
CHLORIDE SERPL-SCNC: 114 MMOL/L — HIGH (ref 98–107)
CHLORIDE SERPL-SCNC: 115 MMOL/L — HIGH (ref 98–107)
CHLORIDE SERPL-SCNC: 116 MMOL/L — HIGH (ref 98–107)
CHLORIDE SERPL-SCNC: 116 MMOL/L — HIGH (ref 98–107)
CHLORIDE SERPL-SCNC: 118 MMOL/L — HIGH (ref 98–107)
CHLORIDE SERPL-SCNC: 118 MMOL/L — HIGH (ref 98–107)
CHLORIDE SERPL-SCNC: 119 MMOL/L — HIGH (ref 98–107)
CHOLEST FLD-MCNC: 44 MG/DL — SIGNIFICANT CHANGE UP
CHOLEST FLD-MCNC: 64 MG/DL — SIGNIFICANT CHANGE UP
CO2 BLDV-SCNC: 17.2 MMOL/L — LOW (ref 22–26)
CO2 BLDV-SCNC: 20.9 MMOL/L — LOW (ref 22–26)
CO2 BLDV-SCNC: 21.8 MMOL/L — LOW (ref 22–26)
CO2 BLDV-SCNC: 22.1 MMOL/L — SIGNIFICANT CHANGE UP (ref 22–26)
CO2 BLDV-SCNC: 25.6 MMOL/L — SIGNIFICANT CHANGE UP (ref 22–26)
CO2 BLDV-SCNC: 26.3 MMOL/L — HIGH (ref 22–26)
CO2 SERPL-SCNC: 15 MMOL/L — LOW (ref 22–31)
CO2 SERPL-SCNC: 16 MMOL/L — LOW (ref 22–31)
CO2 SERPL-SCNC: 16 MMOL/L — LOW (ref 22–31)
CO2 SERPL-SCNC: 17 MMOL/L
CO2 SERPL-SCNC: 17 MMOL/L
CO2 SERPL-SCNC: 17 MMOL/L — LOW (ref 22–31)
CO2 SERPL-SCNC: 19 MMOL/L — LOW (ref 22–31)
CO2 SERPL-SCNC: 19 MMOL/L — LOW (ref 22–31)
CO2 SERPL-SCNC: 20 MMOL/L
CO2 SERPL-SCNC: 20 MMOL/L — LOW (ref 22–31)
CO2 SERPL-SCNC: 21 MMOL/L
CO2 SERPL-SCNC: 21 MMOL/L
CO2 SERPL-SCNC: 22 MMOL/L — SIGNIFICANT CHANGE UP (ref 22–31)
CO2 SERPL-SCNC: 23 MMOL/L
CO2 SERPL-SCNC: 24 MMOL/L
CO2 SERPL-SCNC: 25 MMOL/L
CO2 SERPL-SCNC: 27 MMOL/L — SIGNIFICANT CHANGE UP (ref 22–31)
COLOR FLD: ABNORMAL
COLOR FLD: SIGNIFICANT CHANGE UP
COLOR FLD: YELLOW
COLOR FLD: YELLOW
COLOR SPEC: YELLOW — SIGNIFICANT CHANGE UP
COMMENT - FLUIDS: SIGNIFICANT CHANGE UP
CREAT SERPL-MCNC: 0.89 MG/DL — SIGNIFICANT CHANGE UP (ref 0.5–1.3)
CREAT SERPL-MCNC: 1.21 MG/DL — SIGNIFICANT CHANGE UP (ref 0.5–1.3)
CREAT SERPL-MCNC: 1.33 MG/DL
CREAT SERPL-MCNC: 1.4 MG/DL — HIGH (ref 0.5–1.3)
CREAT SERPL-MCNC: 1.4 MG/DL — HIGH (ref 0.5–1.3)
CREAT SERPL-MCNC: 1.41 MG/DL
CREAT SERPL-MCNC: 1.45 MG/DL
CREAT SERPL-MCNC: 1.46 MG/DL
CREAT SERPL-MCNC: 1.59 MG/DL
CREAT SERPL-MCNC: 1.59 MG/DL
CREAT SERPL-MCNC: 1.61 MG/DL — HIGH (ref 0.5–1.3)
CREAT SERPL-MCNC: 1.66 MG/DL
CREAT SERPL-MCNC: 1.69 MG/DL — HIGH (ref 0.5–1.3)
CREAT SERPL-MCNC: 1.76 MG/DL — HIGH (ref 0.5–1.3)
CREAT SERPL-MCNC: 1.81 MG/DL
CREAT SERPL-MCNC: 1.89 MG/DL — HIGH (ref 0.5–1.3)
CREAT SERPL-MCNC: 1.99 MG/DL — HIGH (ref 0.5–1.3)
CULTURE RESULTS: ABNORMAL
CULTURE RESULTS: SIGNIFICANT CHANGE UP
D DIMER BLD IA.RAPID-MCNC: 581 NG/ML DDU — HIGH
DIFF PNL FLD: ABNORMAL
EGFR: 24 ML/MIN/1.73M2 — LOW
EGFR: 26 ML/MIN/1.73M2 — LOW
EGFR: 27 ML/MIN/1.73M2
EGFR: 28 ML/MIN/1.73M2 — LOW
EGFR: 30 ML/MIN/1.73M2
EGFR: 30 ML/MIN/1.73M2 — LOW
EGFR: 32 ML/MIN/1.73M2
EGFR: 32 ML/MIN/1.73M2
EGFR: 32 ML/MIN/1.73M2 — LOW
EGFR: 36 ML/MIN/1.73M2
EGFR: 36 ML/MIN/1.73M2
EGFR: 37 ML/MIN/1.73M2
EGFR: 37 ML/MIN/1.73M2 — LOW
EGFR: 37 ML/MIN/1.73M2 — LOW
EGFR: 40 ML/MIN/1.73M2
EGFR: 44 ML/MIN/1.73M2 — LOW
EGFR: 64 ML/MIN/1.73M2 — SIGNIFICANT CHANGE UP
ELLIPTOCYTES BLD QL SMEAR: SLIGHT — SIGNIFICANT CHANGE UP
ELLIPTOCYTES BLD QL SMEAR: SLIGHT — SIGNIFICANT CHANGE UP
EOSINOPHIL # BLD AUTO: 0 K/UL
EOSINOPHIL # BLD AUTO: 0 K/UL — SIGNIFICANT CHANGE UP (ref 0–0.5)
EOSINOPHIL # BLD AUTO: 0 K/UL — SIGNIFICANT CHANGE UP (ref 0–0.5)
EOSINOPHIL # BLD AUTO: 0.04 K/UL — SIGNIFICANT CHANGE UP (ref 0–0.5)
EOSINOPHIL # BLD AUTO: 0.06 K/UL — SIGNIFICANT CHANGE UP (ref 0–0.5)
EOSINOPHIL # BLD AUTO: 0.08 K/UL — SIGNIFICANT CHANGE UP (ref 0–0.5)
EOSINOPHIL # BLD AUTO: 0.14 K/UL — SIGNIFICANT CHANGE UP (ref 0–0.5)
EOSINOPHIL # BLD AUTO: 0.14 K/UL — SIGNIFICANT CHANGE UP (ref 0–0.5)
EOSINOPHIL # FLD: 0 % — SIGNIFICANT CHANGE UP
EOSINOPHIL # FLD: 0 % — SIGNIFICANT CHANGE UP
EOSINOPHIL # FLD: 2 % — SIGNIFICANT CHANGE UP
EOSINOPHIL NFR BLD AUTO: 0 %
EOSINOPHIL NFR BLD AUTO: 0 % — SIGNIFICANT CHANGE UP (ref 0–6)
EOSINOPHIL NFR BLD AUTO: 0 % — SIGNIFICANT CHANGE UP (ref 0–6)
EOSINOPHIL NFR BLD AUTO: 1.4 % — SIGNIFICANT CHANGE UP (ref 0–6)
EOSINOPHIL NFR BLD AUTO: 1.8 % — SIGNIFICANT CHANGE UP (ref 0–6)
EOSINOPHIL NFR BLD AUTO: 2.3 % — SIGNIFICANT CHANGE UP (ref 0–6)
EOSINOPHIL NFR BLD AUTO: 5 % — SIGNIFICANT CHANGE UP (ref 0–6)
EOSINOPHIL NFR BLD AUTO: 5 % — SIGNIFICANT CHANGE UP (ref 0–6)
ESTIMATED AVERAGE GLUCOSE: 103 — SIGNIFICANT CHANGE UP
FLUAV AG NPH QL: SIGNIFICANT CHANGE UP
FLUBV AG NPH QL: SIGNIFICANT CHANGE UP
FLUID INTAKE SUBSTANCE CLASS: SIGNIFICANT CHANGE UP
FOLATE+VIT B12 SERBLD-IMP: 0 % — SIGNIFICANT CHANGE UP
FOLATE+VIT B12 SERBLD-IMP: 0 % — SIGNIFICANT CHANGE UP
GAS PNL BLDV: 139 MMOL/L — SIGNIFICANT CHANGE UP (ref 136–145)
GAS PNL BLDV: 140 MMOL/L — SIGNIFICANT CHANGE UP (ref 136–145)
GAS PNL BLDV: 142 MMOL/L — SIGNIFICANT CHANGE UP (ref 136–145)
GAS PNL BLDV: 144 MMOL/L — SIGNIFICANT CHANGE UP (ref 136–145)
GAS PNL BLDV: 146 MMOL/L — HIGH (ref 136–145)
GAS PNL BLDV: 146 MMOL/L — HIGH (ref 136–145)
GAS PNL BLDV: SIGNIFICANT CHANGE UP
GLUCOSE BLDV-MCNC: 187 MG/DL — HIGH (ref 70–99)
GLUCOSE BLDV-MCNC: 201 MG/DL — HIGH (ref 70–99)
GLUCOSE BLDV-MCNC: 215 MG/DL — HIGH (ref 70–99)
GLUCOSE BLDV-MCNC: 70 MG/DL — SIGNIFICANT CHANGE UP (ref 70–99)
GLUCOSE BLDV-MCNC: 72 MG/DL — SIGNIFICANT CHANGE UP (ref 70–99)
GLUCOSE BLDV-MCNC: 85 MG/DL — SIGNIFICANT CHANGE UP (ref 70–99)
GLUCOSE FLD-MCNC: 108 MG/DL — SIGNIFICANT CHANGE UP
GLUCOSE FLD-MCNC: 76 MG/DL — SIGNIFICANT CHANGE UP
GLUCOSE FLD-MCNC: 91 MG/DL — SIGNIFICANT CHANGE UP
GLUCOSE SERPL-MCNC: 100 MG/DL
GLUCOSE SERPL-MCNC: 115 MG/DL
GLUCOSE SERPL-MCNC: 119 MG/DL
GLUCOSE SERPL-MCNC: 129 MG/DL
GLUCOSE SERPL-MCNC: 131 MG/DL
GLUCOSE SERPL-MCNC: 134 MG/DL — HIGH (ref 70–99)
GLUCOSE SERPL-MCNC: 143 MG/DL
GLUCOSE SERPL-MCNC: 179 MG/DL — HIGH (ref 70–99)
GLUCOSE SERPL-MCNC: 201 MG/DL — HIGH (ref 70–99)
GLUCOSE SERPL-MCNC: 214 MG/DL — HIGH (ref 70–99)
GLUCOSE SERPL-MCNC: 256 MG/DL — HIGH (ref 70–99)
GLUCOSE SERPL-MCNC: 76 MG/DL — SIGNIFICANT CHANGE UP (ref 70–99)
GLUCOSE SERPL-MCNC: 78 MG/DL — SIGNIFICANT CHANGE UP (ref 70–99)
GLUCOSE SERPL-MCNC: 88 MG/DL
GLUCOSE SERPL-MCNC: 89 MG/DL — SIGNIFICANT CHANGE UP (ref 70–99)
GLUCOSE SERPL-MCNC: 93 MG/DL
GLUCOSE SERPL-MCNC: 93 MG/DL — SIGNIFICANT CHANGE UP (ref 70–99)
GLUCOSE UR QL: NEGATIVE MG/DL — SIGNIFICANT CHANGE UP
GRAM STN FLD: ABNORMAL
GRAM STN FLD: SIGNIFICANT CHANGE UP
HCO3 BLDV-SCNC: 16 MMOL/L — LOW (ref 22–29)
HCO3 BLDV-SCNC: 19 MMOL/L — LOW (ref 22–29)
HCO3 BLDV-SCNC: 20 MMOL/L — LOW (ref 22–29)
HCO3 BLDV-SCNC: 21 MMOL/L — LOW (ref 22–29)
HCO3 BLDV-SCNC: 24 MMOL/L — SIGNIFICANT CHANGE UP (ref 22–29)
HCO3 BLDV-SCNC: 25 MMOL/L — SIGNIFICANT CHANGE UP (ref 22–29)
HCT VFR BLD CALC: 28.7 % — LOW (ref 34.5–45)
HCT VFR BLD CALC: 29.2 % — LOW (ref 34.5–45)
HCT VFR BLD CALC: 29.3 % — LOW (ref 34.5–45)
HCT VFR BLD CALC: 29.9 % — LOW (ref 34.5–45)
HCT VFR BLD CALC: 31.1 % — LOW (ref 34.5–45)
HCT VFR BLD CALC: 31.5 % — LOW (ref 34.5–45)
HCT VFR BLD CALC: 31.6 % — LOW (ref 34.5–45)
HCT VFR BLD CALC: 31.6 % — LOW (ref 34.5–45)
HCT VFR BLD CALC: 31.8 % — LOW (ref 34.5–45)
HCT VFR BLD CALC: 32 %
HCT VFR BLD CALC: 32.1 % — LOW (ref 34.5–45)
HCT VFR BLD CALC: 33.4 % — LOW (ref 34.5–45)
HCT VFR BLD CALC: 34.4 % — LOW (ref 34.5–45)
HCT VFR BLD CALC: 36.4 % — SIGNIFICANT CHANGE UP (ref 34.5–45)
HCT VFR BLDA CALC: 28 % — LOW (ref 34.5–46.5)
HCT VFR BLDA CALC: 28 % — LOW (ref 34.5–46.5)
HCT VFR BLDA CALC: 29 % — LOW (ref 34.5–46.5)
HCT VFR BLDA CALC: 30 % — LOW (ref 34.5–46.5)
HCT VFR BLDA CALC: 30 % — LOW (ref 34.5–46.5)
HCT VFR BLDA CALC: 31 % — LOW (ref 34.5–46.5)
HGB BLD CALC-MCNC: 10 G/DL — LOW (ref 11.7–16.1)
HGB BLD CALC-MCNC: 10.1 G/DL — LOW (ref 11.7–16.1)
HGB BLD CALC-MCNC: 10.2 G/DL — LOW (ref 11.7–16.1)
HGB BLD CALC-MCNC: 9.2 G/DL — LOW (ref 11.7–16.1)
HGB BLD CALC-MCNC: 9.4 G/DL — LOW (ref 11.7–16.1)
HGB BLD CALC-MCNC: 9.5 G/DL — LOW (ref 11.7–16.1)
HGB BLD-MCNC: 10 G/DL — LOW (ref 11.5–15.5)
HGB BLD-MCNC: 10.1 G/DL — LOW (ref 11.5–15.5)
HGB BLD-MCNC: 10.2 G/DL — LOW (ref 11.5–15.5)
HGB BLD-MCNC: 10.5 G/DL — LOW (ref 11.5–15.5)
HGB BLD-MCNC: 10.8 G/DL — LOW (ref 11.5–15.5)
HGB BLD-MCNC: 11.5 G/DL — SIGNIFICANT CHANGE UP (ref 11.5–15.5)
HGB BLD-MCNC: 9.2 G/DL — LOW (ref 11.5–15.5)
HGB BLD-MCNC: 9.2 G/DL — LOW (ref 11.5–15.5)
HGB BLD-MCNC: 9.5 G/DL — LOW (ref 11.5–15.5)
HGB BLD-MCNC: 9.6 G/DL — LOW (ref 11.5–15.5)
HGB BLD-MCNC: 9.7 G/DL
HGB BLD-MCNC: 9.8 G/DL — LOW (ref 11.5–15.5)
IANC: 1.61 K/UL — LOW (ref 1.8–7.4)
IANC: 4.61 K/UL — SIGNIFICANT CHANGE UP (ref 1.8–7.4)
IMM GRANULOCYTES NFR BLD AUTO: 0 % — SIGNIFICANT CHANGE UP (ref 0–0.9)
IMM GRANULOCYTES NFR BLD AUTO: 0.3 % — SIGNIFICANT CHANGE UP (ref 0–0.9)
IMM GRANULOCYTES NFR BLD AUTO: 0.4 %
IMM GRANULOCYTES NFR BLD AUTO: 0.4 % — SIGNIFICANT CHANGE UP (ref 0–0.9)
INR BLD: 1.23 RATIO — HIGH (ref 0.85–1.18)
INR BLD: 1.48 RATIO — HIGH (ref 0.85–1.18)
INR BLD: 1.68 RATIO — HIGH (ref 0.85–1.18)
KETONES UR-MCNC: NEGATIVE MG/DL — SIGNIFICANT CHANGE UP
LACTATE BLDV-MCNC: 0.5 MMOL/L — SIGNIFICANT CHANGE UP (ref 0.5–2)
LACTATE BLDV-MCNC: 0.6 MMOL/L — SIGNIFICANT CHANGE UP (ref 0.5–2)
LACTATE BLDV-MCNC: 0.8 MMOL/L — SIGNIFICANT CHANGE UP (ref 0.5–2)
LACTATE BLDV-MCNC: 0.9 MMOL/L — SIGNIFICANT CHANGE UP (ref 0.5–2)
LACTATE BLDV-MCNC: 1.2 MMOL/L — SIGNIFICANT CHANGE UP (ref 0.5–2)
LACTATE BLDV-MCNC: 1.8 MMOL/L — SIGNIFICANT CHANGE UP (ref 0.5–2)
LDH SERPL L TO P-CCNC: 198 U/L — SIGNIFICANT CHANGE UP
LDH SERPL L TO P-CCNC: 360 U/L — SIGNIFICANT CHANGE UP
LDH SERPL L TO P-CCNC: 365 U/L — SIGNIFICANT CHANGE UP
LEUKOCYTE ESTERASE UR-ACNC: NEGATIVE — SIGNIFICANT CHANGE UP
LYMPHOCYTES # BLD AUTO: 0.09 K/UL — LOW (ref 1–3.3)
LYMPHOCYTES # BLD AUTO: 0.34 K/UL — LOW (ref 1–3.3)
LYMPHOCYTES # BLD AUTO: 0.4 K/UL — LOW (ref 1–3.3)
LYMPHOCYTES # BLD AUTO: 0.4 K/UL — LOW (ref 1–3.3)
LYMPHOCYTES # BLD AUTO: 0.47 K/UL — LOW (ref 1–3.3)
LYMPHOCYTES # BLD AUTO: 0.48 K/UL
LYMPHOCYTES # BLD AUTO: 0.58 K/UL — LOW (ref 1–3.3)
LYMPHOCYTES # BLD AUTO: 0.66 K/UL — LOW (ref 1–3.3)
LYMPHOCYTES # BLD AUTO: 1.8 % — LOW (ref 13–44)
LYMPHOCYTES # BLD AUTO: 13.9 % — SIGNIFICANT CHANGE UP (ref 13–44)
LYMPHOCYTES # BLD AUTO: 14 % — SIGNIFICANT CHANGE UP (ref 13–44)
LYMPHOCYTES # BLD AUTO: 14 % — SIGNIFICANT CHANGE UP (ref 13–44)
LYMPHOCYTES # BLD AUTO: 16.5 % — SIGNIFICANT CHANGE UP (ref 13–44)
LYMPHOCYTES # BLD AUTO: 16.8 % — SIGNIFICANT CHANGE UP (ref 13–44)
LYMPHOCYTES # BLD AUTO: 19.7 % — SIGNIFICANT CHANGE UP (ref 13–44)
LYMPHOCYTES # FLD: 19 % — SIGNIFICANT CHANGE UP
LYMPHOCYTES # FLD: 2 % — SIGNIFICANT CHANGE UP
LYMPHOCYTES # FLD: 26 % — SIGNIFICANT CHANGE UP
LYMPHOCYTES # FLD: 28 % — SIGNIFICANT CHANGE UP
LYMPHOCYTES NFR BLD AUTO: 18.5 %
MACROCYTES BLD QL: SIGNIFICANT CHANGE UP
MACROCYTES BLD QL: SLIGHT — SIGNIFICANT CHANGE UP
MAGNESIUM SERPL-MCNC: 2.1 MG/DL
MAGNESIUM SERPL-MCNC: 2.2 MG/DL — SIGNIFICANT CHANGE UP (ref 1.6–2.6)
MAGNESIUM SERPL-MCNC: 2.4 MG/DL
MAGNESIUM SERPL-MCNC: 2.4 MG/DL — SIGNIFICANT CHANGE UP (ref 1.6–2.6)
MAGNESIUM SERPL-MCNC: 2.5 MG/DL
MAGNESIUM SERPL-MCNC: 2.5 MG/DL — SIGNIFICANT CHANGE UP (ref 1.6–2.6)
MAN DIFF?: NORMAL
MANUAL SMEAR VERIFICATION: SIGNIFICANT CHANGE UP
MCHC RBC-ENTMCNC: 28.6 PG — SIGNIFICANT CHANGE UP (ref 27–34)
MCHC RBC-ENTMCNC: 28.7 PG — SIGNIFICANT CHANGE UP (ref 27–34)
MCHC RBC-ENTMCNC: 28.9 PG — SIGNIFICANT CHANGE UP (ref 27–34)
MCHC RBC-ENTMCNC: 29.1 PG — SIGNIFICANT CHANGE UP (ref 27–34)
MCHC RBC-ENTMCNC: 29.2 PG — SIGNIFICANT CHANGE UP (ref 27–34)
MCHC RBC-ENTMCNC: 29.2 PG — SIGNIFICANT CHANGE UP (ref 27–34)
MCHC RBC-ENTMCNC: 29.3 PG — SIGNIFICANT CHANGE UP (ref 27–34)
MCHC RBC-ENTMCNC: 29.4 PG — SIGNIFICANT CHANGE UP (ref 27–34)
MCHC RBC-ENTMCNC: 29.5 PG — SIGNIFICANT CHANGE UP (ref 27–34)
MCHC RBC-ENTMCNC: 29.6 PG
MCHC RBC-ENTMCNC: 29.7 PG — SIGNIFICANT CHANGE UP (ref 27–34)
MCHC RBC-ENTMCNC: 29.7 PG — SIGNIFICANT CHANGE UP (ref 27–34)
MCHC RBC-ENTMCNC: 29.8 PG — SIGNIFICANT CHANGE UP (ref 27–34)
MCHC RBC-ENTMCNC: 30.3 GM/DL
MCHC RBC-ENTMCNC: 30.4 PG — SIGNIFICANT CHANGE UP (ref 27–34)
MCHC RBC-ENTMCNC: 31.4 G/DL — LOW (ref 32–36)
MCHC RBC-ENTMCNC: 31.4 GM/DL — LOW (ref 32–36)
MCHC RBC-ENTMCNC: 31.4 GM/DL — LOW (ref 32–36)
MCHC RBC-ENTMCNC: 31.5 G/DL — LOW (ref 32–36)
MCHC RBC-ENTMCNC: 31.5 GM/DL — LOW (ref 32–36)
MCHC RBC-ENTMCNC: 31.5 GM/DL — LOW (ref 32–36)
MCHC RBC-ENTMCNC: 31.6 G/DL — LOW (ref 32–36)
MCHC RBC-ENTMCNC: 31.7 GM/DL — LOW (ref 32–36)
MCHC RBC-ENTMCNC: 32.1 GM/DL — SIGNIFICANT CHANGE UP (ref 32–36)
MCHC RBC-ENTMCNC: 32.1 GM/DL — SIGNIFICANT CHANGE UP (ref 32–36)
MCHC RBC-ENTMCNC: 33.1 GM/DL — SIGNIFICANT CHANGE UP (ref 32–36)
MCV RBC AUTO: 90.5 FL — SIGNIFICANT CHANGE UP (ref 80–100)
MCV RBC AUTO: 91 FL — SIGNIFICANT CHANGE UP (ref 80–100)
MCV RBC AUTO: 91.4 FL — SIGNIFICANT CHANGE UP (ref 80–100)
MCV RBC AUTO: 91.7 FL — SIGNIFICANT CHANGE UP (ref 80–100)
MCV RBC AUTO: 91.9 FL — SIGNIFICANT CHANGE UP (ref 80–100)
MCV RBC AUTO: 92 FL — SIGNIFICANT CHANGE UP (ref 80–100)
MCV RBC AUTO: 92.4 FL — SIGNIFICANT CHANGE UP (ref 80–100)
MCV RBC AUTO: 92.5 FL — SIGNIFICANT CHANGE UP (ref 80–100)
MCV RBC AUTO: 93 FL — SIGNIFICANT CHANGE UP (ref 80–100)
MCV RBC AUTO: 93 FL — SIGNIFICANT CHANGE UP (ref 80–100)
MCV RBC AUTO: 93.8 FL — SIGNIFICANT CHANGE UP (ref 80–100)
MCV RBC AUTO: 93.8 FL — SIGNIFICANT CHANGE UP (ref 80–100)
MCV RBC AUTO: 94.5 FL — SIGNIFICANT CHANGE UP (ref 80–100)
MCV RBC AUTO: 97.6 FL
MESOTHL CELL # FLD: 0 % — SIGNIFICANT CHANGE UP
MESOTHL CELL # FLD: 0 % — SIGNIFICANT CHANGE UP
MESOTHL CELL # FLD: 14 % — SIGNIFICANT CHANGE UP
METAMYELOCYTES # FLD: 0.9 % — SIGNIFICANT CHANGE UP (ref 0–1)
METHOD TYPE: SIGNIFICANT CHANGE UP
MONOCYTES # BLD AUTO: 0.17 K/UL — SIGNIFICANT CHANGE UP (ref 0–0.9)
MONOCYTES # BLD AUTO: 0.28 K/UL — SIGNIFICANT CHANGE UP (ref 0–0.9)
MONOCYTES # BLD AUTO: 0.3 K/UL
MONOCYTES # BLD AUTO: 0.31 K/UL — SIGNIFICANT CHANGE UP (ref 0–0.9)
MONOCYTES # BLD AUTO: 0.44 K/UL — SIGNIFICANT CHANGE UP (ref 0–0.9)
MONOCYTES # BLD AUTO: 0.6 K/UL — SIGNIFICANT CHANGE UP (ref 0–0.9)
MONOCYTES NFR BLD AUTO: 10 % — SIGNIFICANT CHANGE UP (ref 2–14)
MONOCYTES NFR BLD AUTO: 10 % — SIGNIFICANT CHANGE UP (ref 2–14)
MONOCYTES NFR BLD AUTO: 10.9 % — SIGNIFICANT CHANGE UP (ref 2–14)
MONOCYTES NFR BLD AUTO: 11.5 % — SIGNIFICANT CHANGE UP (ref 2–14)
MONOCYTES NFR BLD AUTO: 11.6 %
MONOCYTES NFR BLD AUTO: 13.1 % — SIGNIFICANT CHANGE UP (ref 2–14)
MONOCYTES NFR BLD AUTO: 7 % — SIGNIFICANT CHANGE UP (ref 2–14)
MONOCYTES NFR BLD AUTO: 8.1 % — SIGNIFICANT CHANGE UP (ref 2–14)
MONOS+MACROS # FLD: 38 % — SIGNIFICANT CHANGE UP
MONOS+MACROS # FLD: 47 % — SIGNIFICANT CHANGE UP
MONOS+MACROS # FLD: 47 % — SIGNIFICANT CHANGE UP
MONOS+MACROS # FLD: 9 % — SIGNIFICANT CHANGE UP
NEUTROPHILS # BLD AUTO: 1.79 K/UL
NEUTROPHILS # BLD AUTO: 1.85 K/UL — SIGNIFICANT CHANGE UP (ref 1.8–7.4)
NEUTROPHILS # BLD AUTO: 1.98 K/UL — SIGNIFICANT CHANGE UP (ref 1.8–7.4)
NEUTROPHILS # BLD AUTO: 1.98 K/UL — SIGNIFICANT CHANGE UP (ref 1.8–7.4)
NEUTROPHILS # BLD AUTO: 1.99 K/UL — SIGNIFICANT CHANGE UP (ref 1.8–7.4)
NEUTROPHILS # BLD AUTO: 2.17 K/UL — SIGNIFICANT CHANGE UP (ref 1.8–7.4)
NEUTROPHILS # BLD AUTO: 2.48 K/UL — SIGNIFICANT CHANGE UP (ref 1.8–7.4)
NEUTROPHILS # BLD AUTO: 4.5 K/UL — SIGNIFICANT CHANGE UP (ref 1.8–7.4)
NEUTROPHILS NFR BLD AUTO: 64.8 % — SIGNIFICANT CHANGE UP (ref 43–77)
NEUTROPHILS NFR BLD AUTO: 69.1 %
NEUTROPHILS NFR BLD AUTO: 70 % — SIGNIFICANT CHANGE UP (ref 43–77)
NEUTROPHILS NFR BLD AUTO: 70 % — SIGNIFICANT CHANGE UP (ref 43–77)
NEUTROPHILS NFR BLD AUTO: 70.1 % — SIGNIFICANT CHANGE UP (ref 43–77)
NEUTROPHILS NFR BLD AUTO: 71.6 % — SIGNIFICANT CHANGE UP (ref 43–77)
NEUTROPHILS NFR BLD AUTO: 76.5 % — SIGNIFICANT CHANGE UP (ref 43–77)
NEUTROPHILS NFR BLD AUTO: 85.8 % — HIGH (ref 43–77)
NEUTROPHILS-BODY FLUID: 13 % — SIGNIFICANT CHANGE UP
NEUTROPHILS-BODY FLUID: 27 % — SIGNIFICANT CHANGE UP
NEUTROPHILS-BODY FLUID: 34 % — SIGNIFICANT CHANGE UP
NEUTROPHILS-BODY FLUID: 89 % — SIGNIFICANT CHANGE UP
NIGHT BLUE STAIN TISS: SIGNIFICANT CHANGE UP
NITRITE UR-MCNC: NEGATIVE — SIGNIFICANT CHANGE UP
NON-GYNECOLOGICAL CYTOLOGY STUDY: SIGNIFICANT CHANGE UP
NRBC # BLD: 0 /100 WBCS — SIGNIFICANT CHANGE UP (ref 0–0)
NRBC # BLD: SIGNIFICANT CHANGE UP /100 WBCS (ref 0–0)
NRBC # BLD: SIGNIFICANT CHANGE UP /100 WBCS (ref 0–0)
NRBC # FLD: 0 K/UL — SIGNIFICANT CHANGE UP (ref 0–0)
NT-PROBNP SERPL-SCNC: HIGH PG/ML
ORGANISM # SPEC MICROSCOPIC CNT: ABNORMAL
ORGANISM # SPEC MICROSCOPIC CNT: ABNORMAL
OTHER CELLS FLD MANUAL: 0 % — SIGNIFICANT CHANGE UP
OTHER CELLS FLD MANUAL: 0 % — SIGNIFICANT CHANGE UP
OTHER CELLS FLD MANUAL: 5 % — SIGNIFICANT CHANGE UP
OVALOCYTES BLD QL SMEAR: SLIGHT — SIGNIFICANT CHANGE UP
OVALOCYTES BLD QL SMEAR: SLIGHT — SIGNIFICANT CHANGE UP
PCO2 BLDV: 40 MMHG — SIGNIFICANT CHANGE UP (ref 39–52)
PCO2 BLDV: 44 MMHG — SIGNIFICANT CHANGE UP (ref 39–52)
PCO2 BLDV: 47 MMHG — SIGNIFICANT CHANGE UP (ref 39–52)
PCO2 BLDV: 48 MMHG — SIGNIFICANT CHANGE UP (ref 39–52)
PCO2 BLDV: 54 MMHG — HIGH (ref 39–52)
PCO2 BLDV: 55 MMHG — HIGH (ref 39–52)
PH BLDV: 7.16 — LOW (ref 7.32–7.43)
PH BLDV: 7.17 — LOW (ref 7.32–7.43)
PH BLDV: 7.21 — LOW (ref 7.32–7.43)
PH BLDV: 7.24 — LOW (ref 7.32–7.43)
PH BLDV: 7.32 — SIGNIFICANT CHANGE UP (ref 7.32–7.43)
PH BLDV: 7.36 — SIGNIFICANT CHANGE UP (ref 7.32–7.43)
PH FLD: 7.6 — SIGNIFICANT CHANGE UP
PH FLD: 7.7 — SIGNIFICANT CHANGE UP
PH FLD: 7.8 — SIGNIFICANT CHANGE UP
PH UR: 5.5 — SIGNIFICANT CHANGE UP (ref 5–8)
PHOSPHATE SERPL-MCNC: 2.5 MG/DL — SIGNIFICANT CHANGE UP (ref 2.5–4.5)
PHOSPHATE SERPL-MCNC: 2.5 MG/DL — SIGNIFICANT CHANGE UP (ref 2.5–4.5)
PHOSPHATE SERPL-MCNC: 2.7 MG/DL — SIGNIFICANT CHANGE UP (ref 2.5–4.5)
PHOSPHATE SERPL-MCNC: 3.3 MG/DL — SIGNIFICANT CHANGE UP (ref 2.5–4.5)
PHOSPHATE SERPL-MCNC: 4.1 MG/DL — SIGNIFICANT CHANGE UP (ref 2.5–4.5)
PHOSPHATE SERPL-MCNC: 4.6 MG/DL — HIGH (ref 2.5–4.5)
PLAT MORPH BLD: NORMAL — SIGNIFICANT CHANGE UP
PLATELET # BLD AUTO: 106 K/UL
PLATELET # BLD AUTO: 109 K/UL — LOW (ref 150–400)
PLATELET # BLD AUTO: 129 K/UL — LOW (ref 150–400)
PLATELET # BLD AUTO: 130 K/UL — LOW (ref 150–400)
PLATELET # BLD AUTO: 136 K/UL — LOW (ref 150–400)
PLATELET # BLD AUTO: 168 K/UL — SIGNIFICANT CHANGE UP (ref 150–400)
PLATELET # BLD AUTO: 174 K/UL — SIGNIFICANT CHANGE UP (ref 150–400)
PLATELET # BLD AUTO: 174 K/UL — SIGNIFICANT CHANGE UP (ref 150–400)
PLATELET # BLD AUTO: 47 K/UL — LOW (ref 150–400)
PLATELET # BLD AUTO: 55 K/UL — LOW (ref 150–400)
PLATELET # BLD AUTO: 62 K/UL — LOW (ref 150–400)
PLATELET # BLD AUTO: 73 K/UL — LOW (ref 150–400)
PLATELET # BLD AUTO: 92 K/UL — LOW (ref 150–400)
PLATELET # BLD AUTO: 92 K/UL — LOW (ref 150–400)
PLATELET COUNT - ESTIMATE: ABNORMAL
PO2 BLDV: 25 MMHG — SIGNIFICANT CHANGE UP (ref 25–45)
PO2 BLDV: 31 MMHG — SIGNIFICANT CHANGE UP (ref 25–45)
PO2 BLDV: 36 MMHG — SIGNIFICANT CHANGE UP (ref 25–45)
PO2 BLDV: 42 MMHG — SIGNIFICANT CHANGE UP (ref 25–45)
PO2 BLDV: 55 MMHG — HIGH (ref 25–45)
PO2 BLDV: 60 MMHG — HIGH (ref 25–45)
POIKILOCYTOSIS BLD QL AUTO: SIGNIFICANT CHANGE UP
POLYCHROMASIA BLD QL SMEAR: SLIGHT — SIGNIFICANT CHANGE UP
POTASSIUM BLDV-SCNC: 4.5 MMOL/L — SIGNIFICANT CHANGE UP (ref 3.5–5.1)
POTASSIUM BLDV-SCNC: 4.6 MMOL/L — SIGNIFICANT CHANGE UP (ref 3.5–5.1)
POTASSIUM BLDV-SCNC: 4.9 MMOL/L — SIGNIFICANT CHANGE UP (ref 3.5–5.1)
POTASSIUM BLDV-SCNC: 5.2 MMOL/L — HIGH (ref 3.5–5.1)
POTASSIUM SERPL-MCNC: 4 MMOL/L — SIGNIFICANT CHANGE UP (ref 3.5–5.3)
POTASSIUM SERPL-MCNC: 4.4 MMOL/L — SIGNIFICANT CHANGE UP (ref 3.5–5.3)
POTASSIUM SERPL-MCNC: 4.6 MMOL/L — SIGNIFICANT CHANGE UP (ref 3.5–5.3)
POTASSIUM SERPL-MCNC: 4.7 MMOL/L — SIGNIFICANT CHANGE UP (ref 3.5–5.3)
POTASSIUM SERPL-MCNC: 4.9 MMOL/L — SIGNIFICANT CHANGE UP (ref 3.5–5.3)
POTASSIUM SERPL-MCNC: 5.1 MMOL/L — SIGNIFICANT CHANGE UP (ref 3.5–5.3)
POTASSIUM SERPL-MCNC: 5.5 MMOL/L — HIGH (ref 3.5–5.3)
POTASSIUM SERPL-SCNC: 3.7 MMOL/L
POTASSIUM SERPL-SCNC: 4 MMOL/L — SIGNIFICANT CHANGE UP (ref 3.5–5.3)
POTASSIUM SERPL-SCNC: 4.1 MMOL/L
POTASSIUM SERPL-SCNC: 4.4 MMOL/L
POTASSIUM SERPL-SCNC: 4.4 MMOL/L
POTASSIUM SERPL-SCNC: 4.4 MMOL/L — SIGNIFICANT CHANGE UP (ref 3.5–5.3)
POTASSIUM SERPL-SCNC: 4.6 MMOL/L — SIGNIFICANT CHANGE UP (ref 3.5–5.3)
POTASSIUM SERPL-SCNC: 4.7 MMOL/L
POTASSIUM SERPL-SCNC: 4.7 MMOL/L — SIGNIFICANT CHANGE UP (ref 3.5–5.3)
POTASSIUM SERPL-SCNC: 4.9 MMOL/L
POTASSIUM SERPL-SCNC: 4.9 MMOL/L — SIGNIFICANT CHANGE UP (ref 3.5–5.3)
POTASSIUM SERPL-SCNC: 5.1 MMOL/L — SIGNIFICANT CHANGE UP (ref 3.5–5.3)
POTASSIUM SERPL-SCNC: 5.4 MMOL/L
POTASSIUM SERPL-SCNC: 5.5 MMOL/L — HIGH (ref 3.5–5.3)
POTASSIUM SERPL-SCNC: 5.6 MMOL/L
PROT FLD-MCNC: 2.9 G/DL — SIGNIFICANT CHANGE UP
PROT FLD-MCNC: 4.5 G/DL — SIGNIFICANT CHANGE UP
PROT SERPL-MCNC: 5.3 G/DL — LOW (ref 6–8.3)
PROT SERPL-MCNC: 5.6 G/DL — LOW (ref 6–8.3)
PROT SERPL-MCNC: 6.1 G/DL
PROT SERPL-MCNC: 6.1 G/DL — SIGNIFICANT CHANGE UP (ref 6–8.3)
PROT SERPL-MCNC: 6.2 G/DL
PROT SERPL-MCNC: 6.2 G/DL — SIGNIFICANT CHANGE UP (ref 6–8.3)
PROT SERPL-MCNC: 6.3 G/DL
PROT SERPL-MCNC: 6.4 G/DL — SIGNIFICANT CHANGE UP (ref 6–8.3)
PROT SERPL-MCNC: 6.7 G/DL
PROT SERPL-MCNC: 6.8 G/DL
PROT SERPL-MCNC: 6.8 G/DL
PROT SERPL-MCNC: 7.1 G/DL
PROT UR-MCNC: 100 MG/DL
PROTHROM AB SERPL-ACNC: 13.7 SEC — HIGH (ref 9.5–13)
PROTHROM AB SERPL-ACNC: 16.5 SEC — HIGH (ref 9.5–13)
PROTHROM AB SERPL-ACNC: 18.5 SEC — HIGH (ref 9.5–13)
RBC # BLD: 3.12 M/UL — LOW (ref 3.8–5.2)
RBC # BLD: 3.14 M/UL — LOW (ref 3.8–5.2)
RBC # BLD: 3.22 M/UL — LOW (ref 3.8–5.2)
RBC # BLD: 3.27 M/UL — LOW (ref 3.8–5.2)
RBC # BLD: 3.28 M/UL
RBC # BLD: 3.29 M/UL — LOW (ref 3.8–5.2)
RBC # BLD: 3.37 M/UL — LOW (ref 3.8–5.2)
RBC # BLD: 3.37 M/UL — LOW (ref 3.8–5.2)
RBC # BLD: 3.46 M/UL — LOW (ref 3.8–5.2)
RBC # BLD: 3.48 M/UL — LOW (ref 3.8–5.2)
RBC # BLD: 3.5 M/UL — LOW (ref 3.8–5.2)
RBC # BLD: 3.61 M/UL — LOW (ref 3.8–5.2)
RBC # BLD: 3.7 M/UL — LOW (ref 3.8–5.2)
RBC # BLD: 3.94 M/UL — SIGNIFICANT CHANGE UP (ref 3.8–5.2)
RBC # FLD: 13.7 % — SIGNIFICANT CHANGE UP (ref 10.3–14.5)
RBC # FLD: 14.6 % — HIGH (ref 10.3–14.5)
RBC # FLD: 15.1 %
RBC # FLD: 15.1 % — HIGH (ref 10.3–14.5)
RBC # FLD: 15.2 % — HIGH (ref 10.3–14.5)
RBC # FLD: 15.2 % — HIGH (ref 10.3–14.5)
RBC # FLD: 15.3 % — HIGH (ref 10.3–14.5)
RBC # FLD: 15.5 % — HIGH (ref 10.3–14.5)
RBC # FLD: 15.6 % — HIGH (ref 10.3–14.5)
RBC # FLD: 15.7 % — HIGH (ref 10.3–14.5)
RBC # FLD: 15.7 % — HIGH (ref 10.3–14.5)
RBC # FLD: 15.8 % — HIGH (ref 10.3–14.5)
RBC BLD AUTO: ABNORMAL
RBC BLD AUTO: SIGNIFICANT CHANGE UP
RBC CASTS # UR COMP ASSIST: 1 /HPF — SIGNIFICANT CHANGE UP (ref 0–4)
RCV VOL RI: 1000 CELLS/UL — HIGH (ref 0–5)
RCV VOL RI: 9000 CELLS/UL — HIGH (ref 0–5)
RCV VOL RI: <2000 CELLS/UL — HIGH (ref 0–5)
RCV VOL RI: SIGNIFICANT CHANGE UP CELLS/UL (ref 0–5)
RH IG SCN BLD-IMP: POSITIVE — SIGNIFICANT CHANGE UP
RH IG SCN BLD-IMP: POSITIVE — SIGNIFICANT CHANGE UP
RSV RNA NPH QL NAA+NON-PROBE: SIGNIFICANT CHANGE UP
SAO2 % BLDV: 39.8 % — LOW (ref 67–88)
SAO2 % BLDV: 54.9 % — LOW (ref 67–88)
SAO2 % BLDV: 58.4 % — LOW (ref 67–88)
SAO2 % BLDV: 79.5 % — SIGNIFICANT CHANGE UP (ref 67–88)
SAO2 % BLDV: 86.8 % — SIGNIFICANT CHANGE UP (ref 67–88)
SAO2 % BLDV: 93.1 % — HIGH (ref 67–88)
SARS-COV-2 RNA SPEC QL NAA+PROBE: DETECTED
SODIUM SERPL-SCNC: 140 MMOL/L
SODIUM SERPL-SCNC: 140 MMOL/L — SIGNIFICANT CHANGE UP (ref 135–145)
SODIUM SERPL-SCNC: 141 MMOL/L
SODIUM SERPL-SCNC: 142 MMOL/L
SODIUM SERPL-SCNC: 142 MMOL/L — SIGNIFICANT CHANGE UP (ref 135–145)
SODIUM SERPL-SCNC: 143 MMOL/L — SIGNIFICANT CHANGE UP (ref 135–145)
SODIUM SERPL-SCNC: 145 MMOL/L — SIGNIFICANT CHANGE UP (ref 135–145)
SODIUM SERPL-SCNC: 145 MMOL/L — SIGNIFICANT CHANGE UP (ref 135–145)
SODIUM SERPL-SCNC: 148 MMOL/L — HIGH (ref 135–145)
SODIUM SERPL-SCNC: 149 MMOL/L — HIGH (ref 135–145)
SODIUM SERPL-SCNC: 150 MMOL/L — HIGH (ref 135–145)
SODIUM SERPL-SCNC: 152 MMOL/L — HIGH (ref 135–145)
SP GR SPEC: 1.02 — SIGNIFICANT CHANGE UP (ref 1–1.03)
SPECIMEN SOURCE FLD: SIGNIFICANT CHANGE UP
SPECIMEN SOURCE: SIGNIFICANT CHANGE UP
SQUAMOUS # UR AUTO: 6 /HPF — HIGH (ref 0–5)
TOTAL CELLS COUNTED, BODY FLUID: 100 CELLS — SIGNIFICANT CHANGE UP
TOTAL NUCLEATED CELL COUNT, BODY FLUID: 197 CELLS/UL — HIGH (ref 0–5)
TOTAL NUCLEATED CELL COUNT, BODY FLUID: 308 CELLS/UL — HIGH (ref 0–5)
TOTAL NUCLEATED CELL COUNT, BODY FLUID: 634 CELLS/UL — HIGH (ref 0–5)
TOTAL NUCLEATED CELL COUNT, BODY FLUID: SIGNIFICANT CHANGE UP CELLS/UL (ref 0–5)
TRIGL FLD-MCNC: 14 MG/DL — SIGNIFICANT CHANGE UP
TRIGL FLD-MCNC: 18 MG/DL — SIGNIFICANT CHANGE UP
TRIGL FLD-MCNC: 20 MG/DL — SIGNIFICANT CHANGE UP
TROPONIN T, HIGH SENSITIVITY RESULT: 67 NG/L — CRITICAL HIGH
TROPONIN T, HIGH SENSITIVITY RESULT: 69 NG/L — CRITICAL HIGH
TSH SERPL-ACNC: 1.2 UIU/ML
TSH SERPL-ACNC: 1.22 UIU/ML
TSH SERPL-ACNC: 1.92 UIU/ML
TUBE TYPE: SIGNIFICANT CHANGE UP
UROBILINOGEN FLD QL: 0.2 MG/DL — SIGNIFICANT CHANGE UP (ref 0.2–1)
VARIANT LYMPHS # BLD: 1.7 % — SIGNIFICANT CHANGE UP (ref 0–6)
WBC # BLD: 2.42 K/UL — LOW (ref 3.8–10.5)
WBC # BLD: 2.83 K/UL — LOW (ref 3.8–10.5)
WBC # BLD: 2.83 K/UL — LOW (ref 3.8–10.5)
WBC # BLD: 2.84 K/UL — LOW (ref 3.8–10.5)
WBC # BLD: 3.35 K/UL — LOW (ref 3.8–10.5)
WBC # BLD: 3.46 K/UL — LOW (ref 3.8–10.5)
WBC # BLD: 3.77 K/UL — LOW (ref 3.8–10.5)
WBC # BLD: 5.24 K/UL — SIGNIFICANT CHANGE UP (ref 3.8–10.5)
WBC # BLD: 6.01 K/UL — SIGNIFICANT CHANGE UP (ref 3.8–10.5)
WBC # BLD: 6.53 K/UL — SIGNIFICANT CHANGE UP (ref 3.8–10.5)
WBC # BLD: 6.62 K/UL — SIGNIFICANT CHANGE UP (ref 3.8–10.5)
WBC # BLD: 6.63 K/UL — SIGNIFICANT CHANGE UP (ref 3.8–10.5)
WBC # BLD: 6.82 K/UL — SIGNIFICANT CHANGE UP (ref 3.8–10.5)
WBC # FLD AUTO: 2.42 K/UL — LOW (ref 3.8–10.5)
WBC # FLD AUTO: 2.59 K/UL
WBC # FLD AUTO: 2.83 K/UL — LOW (ref 3.8–10.5)
WBC # FLD AUTO: 2.83 K/UL — LOW (ref 3.8–10.5)
WBC # FLD AUTO: 2.84 K/UL — LOW (ref 3.8–10.5)
WBC # FLD AUTO: 3.35 K/UL — LOW (ref 3.8–10.5)
WBC # FLD AUTO: 3.46 K/UL — LOW (ref 3.8–10.5)
WBC # FLD AUTO: 3.77 K/UL — LOW (ref 3.8–10.5)
WBC # FLD AUTO: 5.24 K/UL — SIGNIFICANT CHANGE UP (ref 3.8–10.5)
WBC # FLD AUTO: 6.01 K/UL — SIGNIFICANT CHANGE UP (ref 3.8–10.5)
WBC # FLD AUTO: 6.53 K/UL — SIGNIFICANT CHANGE UP (ref 3.8–10.5)
WBC # FLD AUTO: 6.62 K/UL — SIGNIFICANT CHANGE UP (ref 3.8–10.5)
WBC # FLD AUTO: 6.63 K/UL — SIGNIFICANT CHANGE UP (ref 3.8–10.5)
WBC # FLD AUTO: 6.82 K/UL — SIGNIFICANT CHANGE UP (ref 3.8–10.5)
WBC UR QL: 2 /HPF — SIGNIFICANT CHANGE UP (ref 0–5)

## 2024-01-01 PROCEDURE — 88108 CYTOPATH CONCENTRATE TECH: CPT | Mod: 26,59

## 2024-01-01 PROCEDURE — 99233 SBSQ HOSP IP/OBS HIGH 50: CPT

## 2024-01-01 PROCEDURE — 75989 ABSCESS DRAINAGE UNDER X-RAY: CPT | Mod: 26,GC

## 2024-01-01 PROCEDURE — 31624 DX BRONCHOSCOPE/LAVAGE: CPT | Mod: GC

## 2024-01-01 PROCEDURE — 99215 OFFICE O/P EST HI 40 MIN: CPT

## 2024-01-01 PROCEDURE — 99214 OFFICE O/P EST MOD 30 MIN: CPT

## 2024-01-01 PROCEDURE — G2211 COMPLEX E/M VISIT ADD ON: CPT

## 2024-01-01 PROCEDURE — 71045 X-RAY EXAM CHEST 1 VIEW: CPT | Mod: 26

## 2024-01-01 PROCEDURE — 88341 IMHCHEM/IMCYTCHM EA ADD ANTB: CPT | Mod: 26

## 2024-01-01 PROCEDURE — 88305 TISSUE EXAM BY PATHOLOGIST: CPT | Mod: 26

## 2024-01-01 PROCEDURE — 99233 SBSQ HOSP IP/OBS HIGH 50: CPT | Mod: GC

## 2024-01-01 PROCEDURE — 71250 CT THORAX DX C-: CPT

## 2024-01-01 PROCEDURE — ZZZZZ: CPT

## 2024-01-01 PROCEDURE — 71250 CT THORAX DX C-: CPT | Mod: 26

## 2024-01-01 PROCEDURE — 99232 SBSQ HOSP IP/OBS MODERATE 35: CPT

## 2024-01-01 PROCEDURE — 99223 1ST HOSP IP/OBS HIGH 75: CPT

## 2024-01-01 PROCEDURE — 99205 OFFICE O/P NEW HI 60 MIN: CPT

## 2024-01-01 PROCEDURE — 70450 CT HEAD/BRAIN W/O DYE: CPT | Mod: 26

## 2024-01-01 PROCEDURE — 76604 US EXAM CHEST: CPT | Mod: 26,GC

## 2024-01-01 PROCEDURE — 71046 X-RAY EXAM CHEST 2 VIEWS: CPT

## 2024-01-01 PROCEDURE — A9585: CPT

## 2024-01-01 PROCEDURE — 93010 ELECTROCARDIOGRAM REPORT: CPT

## 2024-01-01 PROCEDURE — 88342 IMHCHEM/IMCYTCHM 1ST ANTB: CPT | Mod: 26

## 2024-01-01 PROCEDURE — 32555 ASPIRATE PLEURA W/ IMAGING: CPT | Mod: GC

## 2024-01-01 PROCEDURE — 70553 MRI BRAIN STEM W/O & W/DYE: CPT | Mod: 26

## 2024-01-01 PROCEDURE — 99223 1ST HOSP IP/OBS HIGH 75: CPT | Mod: GC

## 2024-01-01 PROCEDURE — G0444 DEPRESSION SCREEN ANNUAL: CPT | Mod: 59

## 2024-01-01 PROCEDURE — 71250 CT THORAX DX C-: CPT | Mod: 26,MC

## 2024-01-01 PROCEDURE — 88112 CYTOPATH CELL ENHANCE TECH: CPT | Mod: 26

## 2024-01-01 PROCEDURE — 99233 SBSQ HOSP IP/OBS HIGH 50: CPT | Mod: 25

## 2024-01-01 PROCEDURE — 31645 BRNCHSC W/THER ASPIR 1ST: CPT | Mod: GC

## 2024-01-01 PROCEDURE — 74018 RADEX ABDOMEN 1 VIEW: CPT | Mod: 26

## 2024-01-01 PROCEDURE — 76604 US EXAM CHEST: CPT

## 2024-01-01 PROCEDURE — 74177 CT ABD & PELVIS W/CONTRAST: CPT

## 2024-01-01 PROCEDURE — 99232 SBSQ HOSP IP/OBS MODERATE 35: CPT | Mod: GC

## 2024-01-01 PROCEDURE — 71260 CT THORAX DX C+: CPT

## 2024-01-01 PROCEDURE — 99285 EMERGENCY DEPT VISIT HI MDM: CPT

## 2024-01-01 PROCEDURE — 99214 OFFICE O/P EST MOD 30 MIN: CPT | Mod: 25

## 2024-01-01 PROCEDURE — 71260 CT THORAX DX C+: CPT | Mod: 26

## 2024-01-01 PROCEDURE — 93000 ELECTROCARDIOGRAM COMPLETE: CPT | Mod: XU

## 2024-01-01 PROCEDURE — 88360 TUMOR IMMUNOHISTOCHEM/MANUAL: CPT | Mod: 26,59

## 2024-01-01 PROCEDURE — 99233 SBSQ HOSP IP/OBS HIGH 50: CPT | Mod: GC,25

## 2024-01-01 PROCEDURE — 70553 MRI BRAIN STEM W/O & W/DYE: CPT

## 2024-01-01 PROCEDURE — 99284 EMERGENCY DEPT VISIT MOD MDM: CPT

## 2024-01-01 PROCEDURE — 74176 CT ABD & PELVIS W/O CONTRAST: CPT | Mod: 26,MC

## 2024-01-01 PROCEDURE — 99367 TEAM CONF W/O PAT BY PHYS: CPT

## 2024-01-01 PROCEDURE — 32550 INSERT PLEURAL CATH: CPT | Mod: GC

## 2024-01-01 PROCEDURE — 71046 X-RAY EXAM CHEST 2 VIEWS: CPT | Mod: 26

## 2024-01-01 PROCEDURE — 74177 CT ABD & PELVIS W/CONTRAST: CPT | Mod: 26

## 2024-01-01 PROCEDURE — 32555 ASPIRATE PLEURA W/ IMAGING: CPT | Mod: 1L,GC

## 2024-01-01 DEVICE — PACK THORACENTESIS CHG: Type: IMPLANTABLE DEVICE | Status: FUNCTIONAL

## 2024-01-01 DEVICE — PACK THORACENTESIS CHG: Type: IMPLANTABLE DEVICE | Site: RIGHT | Status: FUNCTIONAL

## 2024-01-01 DEVICE — PLEURX CATHETER KIT: Type: IMPLANTABLE DEVICE | Site: LEFT | Status: FUNCTIONAL

## 2024-01-01 RX ORDER — SODIUM BICARBONATE 650 MG/1
650 TABLET ORAL
Refills: 0 | Status: DISCONTINUED | OUTPATIENT
Start: 2024-01-01 | End: 2024-01-01

## 2024-01-01 RX ORDER — SODIUM BICARBONATE 650 MG/1
650 TABLET ORAL
Refills: 0 | Status: COMPLETED | OUTPATIENT
Start: 2024-01-01 | End: 2024-01-01

## 2024-01-01 RX ORDER — MIRTAZAPINE 45 MG/1
1 TABLET, ORALLY DISINTEGRATING ORAL
Refills: 0 | DISCHARGE

## 2024-01-01 RX ORDER — PANTOPRAZOLE SODIUM 40 MG/10ML
40 INJECTION, POWDER, FOR SOLUTION INTRAVENOUS
Refills: 0 | Status: DISCONTINUED | OUTPATIENT
Start: 2024-01-01 | End: 2024-01-01

## 2024-01-01 RX ORDER — SENNOSIDES 8.6 MG
2 TABLET ORAL AT BEDTIME
Refills: 0 | Status: DISCONTINUED | OUTPATIENT
Start: 2024-01-01 | End: 2024-01-01

## 2024-01-01 RX ORDER — ALBUTEROL 90 MCG
2 AEROSOL REFILL (GRAM) INHALATION EVERY 6 HOURS
Refills: 0 | Status: DISCONTINUED | OUTPATIENT
Start: 2024-01-01 | End: 2024-01-01

## 2024-01-01 RX ORDER — LOPERAMIDE HYDROCHLORIDE 2 MG/1
2 CAPSULE ORAL 4 TIMES DAILY
Qty: 120 | Refills: 0 | Status: ACTIVE | COMMUNITY
Start: 2023-01-01 | End: 1900-01-01

## 2024-01-01 RX ORDER — ONDANSETRON 4 MG/1
4 TABLET, ORALLY DISINTEGRATING ORAL EVERY 8 HOURS
Qty: 270 | Refills: 2 | Status: ACTIVE | COMMUNITY
Start: 2024-01-01 | End: 1900-01-01

## 2024-01-01 RX ORDER — FUROSEMIDE 20 MG/1
20 TABLET ORAL
Qty: 15 | Refills: 2 | Status: DISCONTINUED | COMMUNITY
Start: 2023-01-01 | End: 2024-01-01

## 2024-01-01 RX ORDER — ATORVASTATIN CALCIUM 20 MG/1
20 TABLET, FILM COATED ORAL
Qty: 90 | Refills: 2 | Status: ACTIVE | COMMUNITY
Start: 2023-03-16 | End: 1900-01-01

## 2024-01-01 RX ORDER — MIRTAZAPINE 15 MG/1
7.5 TABLET, FILM COATED ORAL DAILY
Refills: 0 | Status: DISCONTINUED | OUTPATIENT
Start: 2024-01-01 | End: 2024-01-01

## 2024-01-01 RX ORDER — BISACODYL 5 MG
5 TABLET, DELAYED RELEASE (ENTERIC COATED) ORAL EVERY 12 HOURS
Refills: 0 | Status: DISCONTINUED | OUTPATIENT
Start: 2024-01-01 | End: 2024-01-01

## 2024-01-01 RX ORDER — HYDROXYCHLOROQUINE SULFATE 200 MG
200 TABLET ORAL DAILY
Refills: 0 | Status: DISCONTINUED | OUTPATIENT
Start: 2024-01-01 | End: 2024-01-01

## 2024-01-01 RX ORDER — METOCLOPRAMIDE 10 MG/1
10 TABLET ORAL
Qty: 120 | Refills: 0 | Status: COMPLETED | COMMUNITY
Start: 2024-01-01 | End: 2024-01-01

## 2024-01-01 RX ORDER — APIXABAN 5 MG/1
2.5 TABLET, FILM COATED ORAL
Refills: 0 | Status: DISCONTINUED | OUTPATIENT
Start: 2024-01-01 | End: 2024-01-01

## 2024-01-01 RX ORDER — CEFTRIAXONE SODIUM 500 MG
1000 VIAL (EA) INJECTION EVERY 24 HOURS
Refills: 0 | Status: COMPLETED | OUTPATIENT
Start: 2024-01-01 | End: 2024-01-01

## 2024-01-01 RX ORDER — CLINDAMYCIN PHOSPHATE 1 G/10ML
1 GEL TOPICAL
Qty: 1 | Refills: 2 | Status: COMPLETED | COMMUNITY
Start: 2023-05-23 | End: 2024-01-01

## 2024-01-01 RX ORDER — PANTOPRAZOLE 40 MG/1
40 TABLET, DELAYED RELEASE ORAL TWICE DAILY
Qty: 180 | Refills: 3 | Status: ACTIVE | COMMUNITY
Start: 2022-09-16 | End: 1900-01-01

## 2024-01-01 RX ORDER — APIXABAN 2.5 MG/1
2.5 TABLET, FILM COATED ORAL
Qty: 180 | Refills: 3 | Status: ACTIVE | COMMUNITY
Start: 1900-01-01 | End: 1900-01-01

## 2024-01-01 RX ORDER — FUROSEMIDE 20 MG/1
20 TABLET ORAL
Qty: 15 | Refills: 0 | Status: COMPLETED | COMMUNITY
Start: 2024-01-01 | End: 2024-01-01

## 2024-01-01 RX ORDER — DEXAMETHASONE 1 MG/1
6 TABLET ORAL DAILY
Refills: 0 | Status: DISCONTINUED | OUTPATIENT
Start: 2024-01-01 | End: 2024-01-01

## 2024-01-01 RX ORDER — SODIUM CHLORIDE 0.9 % (FLUSH) 0.9 %
4 SYRINGE (ML) INJECTION EVERY 12 HOURS
Refills: 0 | Status: DISCONTINUED | OUTPATIENT
Start: 2024-01-01 | End: 2024-01-01

## 2024-01-01 RX ORDER — GUAIFENESIN 100 %
600 POWDER (GRAM) MISCELLANEOUS EVERY 12 HOURS
Refills: 0 | Status: DISCONTINUED | OUTPATIENT
Start: 2024-01-01 | End: 2024-01-01

## 2024-01-01 RX ORDER — MORPHINE SULFATE 100 MG/1
2 TABLET, EXTENDED RELEASE ORAL
Refills: 0 | Status: DISCONTINUED | OUTPATIENT
Start: 2024-01-01 | End: 2024-01-01

## 2024-01-01 RX ORDER — ONDANSETRON HYDROCHLORIDE 2 MG/ML
4 INJECTION INTRAMUSCULAR; INTRAVENOUS ONCE
Refills: 0 | Status: DISCONTINUED | OUTPATIENT
Start: 2024-01-01 | End: 2024-01-01

## 2024-01-01 RX ORDER — FAMOTIDINE 10 MG/ML
1 INJECTION INTRAVENOUS
Refills: 0 | DISCHARGE

## 2024-01-01 RX ORDER — SODIUM CHLORIDE 0.9 % (FLUSH) 0.9 %
500 SYRINGE (ML) INJECTION ONCE
Refills: 0 | Status: COMPLETED | OUTPATIENT
Start: 2024-01-01 | End: 2024-01-01

## 2024-01-01 RX ORDER — MORPHINE SULFATE 100 MG/1
2 TABLET, EXTENDED RELEASE ORAL EVERY 4 HOURS
Refills: 0 | Status: DISCONTINUED | OUTPATIENT
Start: 2024-01-01 | End: 2024-01-01

## 2024-01-01 RX ORDER — ONDANSETRON HYDROCHLORIDE 2 MG/ML
1 INJECTION INTRAMUSCULAR; INTRAVENOUS
Qty: 0 | Refills: 2 | DISCHARGE
Start: 2024-01-01

## 2024-01-01 RX ORDER — HYDROXYCHLOROQUINE SULFATE 200 MG
1 TABLET ORAL
Qty: 0 | Refills: 0 | DISCHARGE

## 2024-01-01 RX ORDER — PAROXETINE HYDROCHLORIDE 37.5 MG/1
10 TABLET, FILM COATED, EXTENDED RELEASE ORAL DAILY
Refills: 0 | Status: DISCONTINUED | OUTPATIENT
Start: 2024-01-01 | End: 2024-01-01

## 2024-01-01 RX ORDER — APIXABAN 2.5 MG/1
1 TABLET, FILM COATED ORAL
Qty: 0 | Refills: 0 | DISCHARGE

## 2024-01-01 RX ORDER — FOLIC ACID 1 MG/1
1 TABLET ORAL
Qty: 90 | Refills: 2 | Status: ACTIVE | COMMUNITY
Start: 2024-01-01 | End: 1900-01-01

## 2024-01-01 RX ORDER — FENTANYL CITRATE 50 UG/ML
12.5 INJECTION INTRAVENOUS
Refills: 0 | Status: DISCONTINUED | OUTPATIENT
Start: 2024-01-01 | End: 2024-01-01

## 2024-01-01 RX ORDER — LORAZEPAM 0.5 MG
0.25 TABLET ORAL EVERY 4 HOURS
Refills: 0 | Status: DISCONTINUED | OUTPATIENT
Start: 2024-01-01 | End: 2024-01-01

## 2024-01-01 RX ORDER — ACETAMINOPHEN 500 MG
650 TABLET ORAL ONCE
Refills: 0 | Status: COMPLETED | OUTPATIENT
Start: 2024-01-01 | End: 2024-01-01

## 2024-01-01 RX ORDER — POLYETHYLENE GLYCOL 3350 1 G/G
17 POWDER ORAL DAILY
Refills: 0 | Status: DISCONTINUED | OUTPATIENT
Start: 2024-01-01 | End: 2024-01-01

## 2024-01-01 RX ORDER — VANCOMYCIN HYDROCHLORIDE 50 MG/ML
500 KIT ORAL ONCE
Refills: 0 | Status: COMPLETED | OUTPATIENT
Start: 2024-01-01 | End: 2024-01-01

## 2024-01-01 RX ORDER — DEXTROSE MONOHYDRATE AND SODIUM CHLORIDE 5; .3 G/100ML; G/100ML
1000 INJECTION, SOLUTION INTRAVENOUS
Refills: 0 | Status: DISCONTINUED | OUTPATIENT
Start: 2024-01-01 | End: 2024-01-01

## 2024-01-01 RX ORDER — ACETAMINOPHEN 325 MG
650 TABLET ORAL EVERY 4 HOURS
Refills: 0 | Status: DISCONTINUED | OUTPATIENT
Start: 2024-01-01 | End: 2024-01-01

## 2024-01-01 RX ORDER — GLYCOPYRROLATE 0.2 MG/ML
0.4 INJECTION, SOLUTION INTRAMUSCULAR; INTRAVENOUS
Refills: 0 | Status: DISCONTINUED | OUTPATIENT
Start: 2024-01-01 | End: 2024-01-01

## 2024-01-01 RX ORDER — ATORVASTATIN CALCIUM 80 MG/1
1 TABLET, FILM COATED ORAL
Refills: 0 | DISCHARGE

## 2024-01-01 RX ORDER — ESCITALOPRAM OXALATE 5 MG/1
5 TABLET ORAL DAILY
Qty: 60 | Refills: 0 | Status: DISCONTINUED | COMMUNITY
Start: 2024-01-01 | End: 2024-01-01

## 2024-01-01 RX ORDER — OSIMERTINIB 80 1/1
80 TABLET, FILM COATED ORAL
Qty: 30 | Refills: 5 | Status: ACTIVE | COMMUNITY
Start: 2024-01-01 | End: 1900-01-01

## 2024-01-01 RX ORDER — SODIUM CHLORIDE 9 MG/ML
3 INJECTION INTRAMUSCULAR; INTRAVENOUS; SUBCUTANEOUS EVERY 8 HOURS
Refills: 0 | Status: DISCONTINUED | OUTPATIENT
Start: 2024-01-01 | End: 2024-01-01

## 2024-01-01 RX ORDER — LACTULOSE 10 G/15ML
10 SOLUTION ORAL
Refills: 0 | Status: DISCONTINUED | OUTPATIENT
Start: 2024-01-01 | End: 2024-01-01

## 2024-01-01 RX ORDER — ACETAMINOPHEN 500 MG
600 TABLET ORAL ONCE
Refills: 0 | Status: COMPLETED | OUTPATIENT
Start: 2024-01-01 | End: 2024-01-01

## 2024-01-01 RX ORDER — FOLIC ACID 1 MG/1
1 TABLET ORAL DAILY
Qty: 90 | Refills: 0 | Status: COMPLETED | COMMUNITY
Start: 2023-04-06 | End: 2024-01-01

## 2024-01-01 RX ORDER — ONDANSETRON HYDROCHLORIDE 2 MG/ML
4 INJECTION INTRAMUSCULAR; INTRAVENOUS EVERY 4 HOURS
Refills: 0 | Status: DISCONTINUED | OUTPATIENT
Start: 2024-01-01 | End: 2024-01-01

## 2024-01-01 RX ORDER — ALBUTEROL 90 MCG
2 AEROSOL REFILL (GRAM) INHALATION EVERY 6 HOURS
Refills: 0 | Status: COMPLETED | OUTPATIENT
Start: 2024-01-01 | End: 2024-01-01

## 2024-01-01 RX ORDER — PANTOPRAZOLE SODIUM 20 MG/1
1 TABLET, DELAYED RELEASE ORAL
Refills: 0 | DISCHARGE

## 2024-01-01 RX ORDER — DENOSUMAB 60 MG/ML
120 INJECTION SUBCUTANEOUS
Refills: 0 | DISCHARGE

## 2024-01-01 RX ORDER — LORAZEPAM 0.5 MG
0.25 TABLET ORAL ONCE
Refills: 0 | Status: DISCONTINUED | OUTPATIENT
Start: 2024-01-01 | End: 2024-01-01

## 2024-01-01 RX ORDER — SODIUM CHLORIDE 0.65 %
1 AEROSOL, SPRAY (ML) NASAL THREE TIMES A DAY
Refills: 0 | Status: DISCONTINUED | OUTPATIENT
Start: 2024-01-01 | End: 2024-01-01

## 2024-01-01 RX ORDER — ALPRAZOLAM 2 MG/1
0.25 TABLET ORAL EVERY 6 HOURS
Refills: 0 | Status: DISCONTINUED | OUTPATIENT
Start: 2024-01-01 | End: 2024-01-01

## 2024-01-01 RX ORDER — FAMOTIDINE 40 MG
20 TABLET ORAL DAILY
Refills: 0 | Status: DISCONTINUED | OUTPATIENT
Start: 2024-01-01 | End: 2024-01-01

## 2024-01-01 RX ORDER — SODIUM CHLORIDE 9 MG/ML
1000 INJECTION, SOLUTION INTRAVENOUS
Refills: 0 | Status: DISCONTINUED | OUTPATIENT
Start: 2024-01-01 | End: 2024-01-01

## 2024-01-01 RX ORDER — CALCIUM CARBONATE 500(1250)
1 TABLET,CHEWABLE ORAL DAILY
Refills: 0 | Status: DISCONTINUED | OUTPATIENT
Start: 2024-01-01 | End: 2024-01-01

## 2024-01-01 RX ORDER — SODIUM CHLORIDE 0.9 % (FLUSH) 0.9 %
1000 SYRINGE (ML) INJECTION
Refills: 0 | Status: DISCONTINUED | OUTPATIENT
Start: 2024-01-01 | End: 2024-01-01

## 2024-01-01 RX ORDER — OSIMERTINIB 40 1/1
40 TABLET, FILM COATED ORAL
Qty: 30 | Refills: 1 | Status: COMPLETED | COMMUNITY
Start: 2023-01-01 | End: 2024-01-01

## 2024-01-01 RX ORDER — PAROXETINE HYDROCHLORIDE 37.5 MG/1
1 TABLET, FILM COATED, EXTENDED RELEASE ORAL
Refills: 0 | DISCHARGE

## 2024-01-01 RX ORDER — PAROXETINE HYDROCHLORIDE 10 MG/1
10 TABLET, FILM COATED ORAL DAILY
Refills: 0 | Status: ACTIVE | COMMUNITY
Start: 2024-01-01

## 2024-01-01 RX ORDER — DULOXETINE HYDROCHLORIDE 20 MG/1
20 CAPSULE, DELAYED RELEASE PELLETS ORAL
Qty: 30 | Refills: 2 | Status: COMPLETED | COMMUNITY
Start: 2024-01-01 | End: 2024-01-01

## 2024-01-01 RX ORDER — SODIUM CHLORIDE 9 MG/ML
500 INJECTION INTRAMUSCULAR; INTRAVENOUS; SUBCUTANEOUS ONCE
Refills: 0 | Status: COMPLETED | OUTPATIENT
Start: 2024-01-01 | End: 2024-01-01

## 2024-01-01 RX ORDER — FAMOTIDINE 20 MG/1
20 TABLET, FILM COATED ORAL
Qty: 90 | Refills: 3 | Status: COMPLETED | COMMUNITY
Start: 2022-09-27 | End: 2024-01-01

## 2024-01-01 RX ORDER — ALBUTEROL 90 MCG
2.5 AEROSOL REFILL (GRAM) INHALATION EVERY 6 HOURS
Refills: 0 | Status: DISCONTINUED | OUTPATIENT
Start: 2024-01-01 | End: 2024-01-01

## 2024-01-01 RX ADMIN — Medication 100 MILLIGRAM(S): at 21:40

## 2024-01-01 RX ADMIN — DEXAMETHASONE 6 MILLIGRAM(S): 1 TABLET ORAL at 21:54

## 2024-01-01 RX ADMIN — SODIUM BICARBONATE 650 MILLIGRAM(S): 650 TABLET ORAL at 17:42

## 2024-01-01 RX ADMIN — Medication 100 MILLIGRAM(S): at 21:30

## 2024-01-01 RX ADMIN — Medication 1 TABLET(S): at 12:17

## 2024-01-01 RX ADMIN — PAROXETINE HYDROCHLORIDE 10 MILLIGRAM(S): 37.5 TABLET, FILM COATED, EXTENDED RELEASE ORAL at 10:18

## 2024-01-01 RX ADMIN — Medication 600 MILLIGRAM(S): at 22:06

## 2024-01-01 RX ADMIN — DEXAMETHASONE 6 MILLIGRAM(S): 1 TABLET ORAL at 21:03

## 2024-01-01 RX ADMIN — SODIUM BICARBONATE 650 MILLIGRAM(S): 650 TABLET ORAL at 17:25

## 2024-01-01 RX ADMIN — Medication 600 MILLIGRAM(S): at 05:09

## 2024-01-01 RX ADMIN — MORPHINE SULFATE 2 MILLIGRAM(S): 100 TABLET, EXTENDED RELEASE ORAL at 04:10

## 2024-01-01 RX ADMIN — DEXAMETHASONE 6 MILLIGRAM(S): 1 TABLET ORAL at 22:49

## 2024-01-01 RX ADMIN — Medication 20 MILLIGRAM(S): at 13:35

## 2024-01-01 RX ADMIN — MIRTAZAPINE 7.5 MILLIGRAM(S): 15 TABLET, FILM COATED ORAL at 11:41

## 2024-01-01 RX ADMIN — Medication 2.5 MILLIGRAM(S): at 21:27

## 2024-01-01 RX ADMIN — Medication 600 MILLIGRAM(S): at 09:20

## 2024-01-01 RX ADMIN — Medication 600 MILLIGRAM(S): at 21:59

## 2024-01-01 RX ADMIN — Medication 200 MILLIGRAM(S): at 13:36

## 2024-01-01 RX ADMIN — Medication 2 PUFF(S): at 15:10

## 2024-01-01 RX ADMIN — Medication 600 MILLIGRAM(S): at 05:33

## 2024-01-01 RX ADMIN — Medication 200 MILLIGRAM(S): at 08:47

## 2024-01-01 RX ADMIN — MIRTAZAPINE 7.5 MILLIGRAM(S): 15 TABLET, FILM COATED ORAL at 10:18

## 2024-01-01 RX ADMIN — SODIUM BICARBONATE 650 MILLIGRAM(S): 650 TABLET ORAL at 17:12

## 2024-01-01 RX ADMIN — APIXABAN 2.5 MILLIGRAM(S): 5 TABLET, FILM COATED ORAL at 05:32

## 2024-01-01 RX ADMIN — ONDANSETRON HYDROCHLORIDE 4 MILLIGRAM(S): 2 INJECTION INTRAMUSCULAR; INTRAVENOUS at 21:55

## 2024-01-01 RX ADMIN — Medication 3 MILLIGRAM(S): at 22:37

## 2024-01-01 RX ADMIN — POLYETHYLENE GLYCOL 3350 17 GRAM(S): 1 POWDER ORAL at 11:42

## 2024-01-01 RX ADMIN — MORPHINE SULFATE 2 MILLIGRAM(S): 100 TABLET, EXTENDED RELEASE ORAL at 13:30

## 2024-01-01 RX ADMIN — PAROXETINE HYDROCHLORIDE 10 MILLIGRAM(S): 37.5 TABLET, FILM COATED, EXTENDED RELEASE ORAL at 12:17

## 2024-01-01 RX ADMIN — Medication 100 MILLIGRAM(S): at 21:27

## 2024-01-01 RX ADMIN — MORPHINE SULFATE 2 MILLIGRAM(S): 100 TABLET, EXTENDED RELEASE ORAL at 12:35

## 2024-01-01 RX ADMIN — Medication 2 PUFF(S): at 05:45

## 2024-01-01 RX ADMIN — Medication 600 MILLIGRAM(S): at 05:29

## 2024-01-01 RX ADMIN — SODIUM BICARBONATE 650 MILLIGRAM(S): 650 TABLET ORAL at 05:33

## 2024-01-01 RX ADMIN — MIRTAZAPINE 7.5 MILLIGRAM(S): 15 TABLET, FILM COATED ORAL at 08:48

## 2024-01-01 RX ADMIN — APIXABAN 2.5 MILLIGRAM(S): 5 TABLET, FILM COATED ORAL at 05:09

## 2024-01-01 RX ADMIN — Medication 2 PUFF(S): at 05:37

## 2024-01-01 RX ADMIN — LACTULOSE 10 GRAM(S): 10 SOLUTION ORAL at 17:57

## 2024-01-01 RX ADMIN — Medication 650 MILLIGRAM(S): at 23:23

## 2024-01-01 RX ADMIN — MIRTAZAPINE 7.5 MILLIGRAM(S): 15 TABLET, FILM COATED ORAL at 12:17

## 2024-01-01 RX ADMIN — Medication 600 MILLIGRAM(S): at 17:12

## 2024-01-01 RX ADMIN — Medication 650 MILLIGRAM(S): at 06:02

## 2024-01-01 RX ADMIN — Medication 20 MILLIGRAM(S): at 11:51

## 2024-01-01 RX ADMIN — Medication 2 PUFF(S): at 23:26

## 2024-01-01 RX ADMIN — Medication 2 PUFF(S): at 12:06

## 2024-01-01 RX ADMIN — MORPHINE SULFATE 2 MILLIGRAM(S): 100 TABLET, EXTENDED RELEASE ORAL at 21:54

## 2024-01-01 RX ADMIN — ALPRAZOLAM 0.25 MILLIGRAM(S): 2 TABLET ORAL at 19:37

## 2024-01-01 RX ADMIN — SODIUM BICARBONATE 650 MILLIGRAM(S): 650 TABLET ORAL at 05:51

## 2024-01-01 RX ADMIN — Medication 2 PUFF(S): at 18:03

## 2024-01-01 RX ADMIN — DEXAMETHASONE 6 MILLIGRAM(S): 1 TABLET ORAL at 21:28

## 2024-01-01 RX ADMIN — Medication 200 MILLIGRAM(S): at 08:08

## 2024-01-01 RX ADMIN — APIXABAN 2.5 MILLIGRAM(S): 5 TABLET, FILM COATED ORAL at 05:29

## 2024-01-01 RX ADMIN — MORPHINE SULFATE 2 MILLIGRAM(S): 100 TABLET, EXTENDED RELEASE ORAL at 11:38

## 2024-01-01 RX ADMIN — DEXAMETHASONE 6 MILLIGRAM(S): 1 TABLET ORAL at 21:40

## 2024-01-01 RX ADMIN — PANTOPRAZOLE SODIUM 40 MILLIGRAM(S): 40 INJECTION, POWDER, FOR SOLUTION INTRAVENOUS at 05:33

## 2024-01-01 RX ADMIN — APIXABAN 2.5 MILLIGRAM(S): 5 TABLET, FILM COATED ORAL at 17:12

## 2024-01-01 RX ADMIN — ALPRAZOLAM 0.25 MILLIGRAM(S): 2 TABLET ORAL at 22:07

## 2024-01-01 RX ADMIN — Medication 2 TABLET(S): at 21:59

## 2024-01-01 RX ADMIN — Medication 650 MILLIGRAM(S): at 18:08

## 2024-01-01 RX ADMIN — Medication 600 MILLIGRAM(S): at 11:41

## 2024-01-01 RX ADMIN — PANTOPRAZOLE SODIUM 40 MILLIGRAM(S): 40 INJECTION, POWDER, FOR SOLUTION INTRAVENOUS at 05:44

## 2024-01-01 RX ADMIN — VANCOMYCIN HYDROCHLORIDE 100 MILLIGRAM(S): KIT at 16:09

## 2024-01-01 RX ADMIN — Medication 650 MILLIGRAM(S): at 05:42

## 2024-01-01 RX ADMIN — Medication 20 MILLIGRAM(S): at 12:17

## 2024-01-01 RX ADMIN — SODIUM BICARBONATE 650 MILLIGRAM(S): 650 TABLET ORAL at 21:15

## 2024-01-01 RX ADMIN — Medication 4 MILLILITER(S): at 21:26

## 2024-01-01 RX ADMIN — Medication 50 MILLILITER(S): at 19:06

## 2024-01-01 RX ADMIN — MIRTAZAPINE 7.5 MILLIGRAM(S): 15 TABLET, FILM COATED ORAL at 11:51

## 2024-01-01 RX ADMIN — POLYETHYLENE GLYCOL 3350 17 GRAM(S): 1 POWDER ORAL at 13:39

## 2024-01-01 RX ADMIN — DEXAMETHASONE 6 MILLIGRAM(S): 1 TABLET ORAL at 21:30

## 2024-01-01 RX ADMIN — SODIUM BICARBONATE 650 MILLIGRAM(S): 650 TABLET ORAL at 17:23

## 2024-01-01 RX ADMIN — PAROXETINE HYDROCHLORIDE 10 MILLIGRAM(S): 37.5 TABLET, FILM COATED, EXTENDED RELEASE ORAL at 11:51

## 2024-01-01 RX ADMIN — Medication 2 PUFF(S): at 04:54

## 2024-01-01 RX ADMIN — SODIUM BICARBONATE 650 MILLIGRAM(S): 650 TABLET ORAL at 04:44

## 2024-01-01 RX ADMIN — Medication 1 SPRAY(S): at 20:13

## 2024-01-01 RX ADMIN — MORPHINE SULFATE 2 MILLIGRAM(S): 100 TABLET, EXTENDED RELEASE ORAL at 00:00

## 2024-01-01 RX ADMIN — Medication 2 PUFF(S): at 16:13

## 2024-01-01 RX ADMIN — PANTOPRAZOLE SODIUM 40 MILLIGRAM(S): 40 INJECTION, POWDER, FOR SOLUTION INTRAVENOUS at 05:57

## 2024-01-01 RX ADMIN — Medication 2 PUFF(S): at 05:50

## 2024-01-01 RX ADMIN — Medication 200 MILLIGRAM(S): at 10:18

## 2024-01-01 RX ADMIN — Medication 200 MILLIGRAM(S): at 12:17

## 2024-01-01 RX ADMIN — ONDANSETRON HYDROCHLORIDE 4 MILLIGRAM(S): 2 INJECTION INTRAMUSCULAR; INTRAVENOUS at 17:58

## 2024-01-01 RX ADMIN — DEXTROSE MONOHYDRATE AND SODIUM CHLORIDE 75 MILLILITER(S): 5; .3 INJECTION, SOLUTION INTRAVENOUS at 16:06

## 2024-01-01 RX ADMIN — Medication 650 MILLIGRAM(S): at 18:35

## 2024-01-01 RX ADMIN — APIXABAN 2.5 MILLIGRAM(S): 5 TABLET, FILM COATED ORAL at 17:23

## 2024-01-01 RX ADMIN — SODIUM BICARBONATE 650 MILLIGRAM(S): 650 TABLET ORAL at 17:57

## 2024-01-01 RX ADMIN — APIXABAN 2.5 MILLIGRAM(S): 5 TABLET, FILM COATED ORAL at 18:59

## 2024-01-01 RX ADMIN — Medication 2 PUFF(S): at 21:45

## 2024-01-01 RX ADMIN — SODIUM BICARBONATE 650 MILLIGRAM(S): 650 TABLET ORAL at 01:04

## 2024-01-01 RX ADMIN — MORPHINE SULFATE 2 MILLIGRAM(S): 100 TABLET, EXTENDED RELEASE ORAL at 04:30

## 2024-01-01 RX ADMIN — Medication 20 MILLIGRAM(S): at 08:47

## 2024-01-01 RX ADMIN — MORPHINE SULFATE 2 MILLIGRAM(S): 100 TABLET, EXTENDED RELEASE ORAL at 10:09

## 2024-01-01 RX ADMIN — Medication 200 MILLIGRAM(S): at 11:51

## 2024-01-01 RX ADMIN — PAROXETINE HYDROCHLORIDE 10 MILLIGRAM(S): 37.5 TABLET, FILM COATED, EXTENDED RELEASE ORAL at 08:48

## 2024-01-01 RX ADMIN — APIXABAN 2.5 MILLIGRAM(S): 5 TABLET, FILM COATED ORAL at 04:44

## 2024-01-01 RX ADMIN — Medication 650 MILLIGRAM(S): at 00:25

## 2024-01-01 RX ADMIN — PANTOPRAZOLE SODIUM 40 MILLIGRAM(S): 40 INJECTION, POWDER, FOR SOLUTION INTRAVENOUS at 05:30

## 2024-01-01 RX ADMIN — Medication 200 MILLIGRAM(S): at 11:41

## 2024-01-01 RX ADMIN — DEXAMETHASONE 6 MILLIGRAM(S): 1 TABLET ORAL at 21:14

## 2024-01-01 RX ADMIN — Medication 600 MILLIGRAM(S): at 18:02

## 2024-01-01 RX ADMIN — PANTOPRAZOLE SODIUM 40 MILLIGRAM(S): 40 INJECTION, POWDER, FOR SOLUTION INTRAVENOUS at 05:09

## 2024-01-01 RX ADMIN — Medication 2 TABLET(S): at 22:06

## 2024-01-01 RX ADMIN — Medication 0.25 MILLIGRAM(S): at 11:40

## 2024-01-01 RX ADMIN — SODIUM BICARBONATE 650 MILLIGRAM(S): 650 TABLET ORAL at 05:30

## 2024-01-01 RX ADMIN — PAROXETINE HYDROCHLORIDE 10 MILLIGRAM(S): 37.5 TABLET, FILM COATED, EXTENDED RELEASE ORAL at 08:08

## 2024-01-01 RX ADMIN — Medication 600 MILLIGRAM(S): at 04:43

## 2024-01-01 RX ADMIN — Medication 0.25 MILLIGRAM(S): at 23:38

## 2024-01-01 RX ADMIN — SODIUM CHLORIDE 500 MILLILITER(S): 9 INJECTION INTRAMUSCULAR; INTRAVENOUS; SUBCUTANEOUS at 19:25

## 2024-01-01 RX ADMIN — MIRTAZAPINE 7.5 MILLIGRAM(S): 15 TABLET, FILM COATED ORAL at 13:37

## 2024-01-01 RX ADMIN — Medication 2 PUFF(S): at 11:39

## 2024-01-01 RX ADMIN — Medication 650 MILLIGRAM(S): at 04:42

## 2024-01-01 RX ADMIN — Medication 100 MILLIGRAM(S): at 21:28

## 2024-01-01 RX ADMIN — MORPHINE SULFATE 2 MILLIGRAM(S): 100 TABLET, EXTENDED RELEASE ORAL at 14:00

## 2024-01-01 RX ADMIN — SODIUM BICARBONATE 650 MILLIGRAM(S): 650 TABLET ORAL at 05:08

## 2024-01-01 RX ADMIN — PANTOPRAZOLE SODIUM 40 MILLIGRAM(S): 40 INJECTION, POWDER, FOR SOLUTION INTRAVENOUS at 05:29

## 2024-01-01 RX ADMIN — SODIUM BICARBONATE 650 MILLIGRAM(S): 650 TABLET ORAL at 05:29

## 2024-01-01 RX ADMIN — Medication 500 MILLILITER(S): at 17:24

## 2024-01-01 RX ADMIN — MORPHINE SULFATE 2 MILLIGRAM(S): 100 TABLET, EXTENDED RELEASE ORAL at 10:30

## 2024-01-01 RX ADMIN — ONDANSETRON HYDROCHLORIDE 4 MILLIGRAM(S): 2 INJECTION INTRAMUSCULAR; INTRAVENOUS at 12:28

## 2024-01-01 RX ADMIN — MORPHINE SULFATE 2 MILLIGRAM(S): 100 TABLET, EXTENDED RELEASE ORAL at 04:00

## 2024-01-01 RX ADMIN — Medication 600 MILLIGRAM(S): at 21:15

## 2024-01-01 RX ADMIN — Medication 600 MILLIGRAM(S): at 17:23

## 2024-01-01 RX ADMIN — Medication 600 MILLIGRAM(S): at 18:18

## 2024-01-01 RX ADMIN — Medication 50 MILLILITER(S): at 22:50

## 2024-01-01 RX ADMIN — Medication 20 MILLIGRAM(S): at 11:41

## 2024-01-01 RX ADMIN — DEXAMETHASONE 6 MILLIGRAM(S): 1 TABLET ORAL at 22:05

## 2024-01-01 RX ADMIN — ALPRAZOLAM 0.25 MILLIGRAM(S): 2 TABLET ORAL at 01:53

## 2024-01-01 RX ADMIN — SODIUM BICARBONATE 650 MILLIGRAM(S): 650 TABLET ORAL at 05:44

## 2024-01-01 RX ADMIN — Medication 2 PUFF(S): at 09:21

## 2024-01-01 RX ADMIN — ONDANSETRON HYDROCHLORIDE 4 MILLIGRAM(S): 2 INJECTION INTRAMUSCULAR; INTRAVENOUS at 17:32

## 2024-01-01 RX ADMIN — APIXABAN 2.5 MILLIGRAM(S): 5 TABLET, FILM COATED ORAL at 17:25

## 2024-01-01 RX ADMIN — Medication 240 MILLIGRAM(S): at 13:05

## 2024-01-01 RX ADMIN — Medication 20 MILLIGRAM(S): at 08:08

## 2024-01-01 RX ADMIN — Medication 2 PUFF(S): at 22:07

## 2024-01-01 RX ADMIN — Medication 1 TABLET(S): at 13:38

## 2024-01-01 RX ADMIN — Medication 1 SPRAY(S): at 14:39

## 2024-01-01 RX ADMIN — LACTULOSE 10 GRAM(S): 10 SOLUTION ORAL at 05:43

## 2024-01-01 RX ADMIN — Medication 1 TABLET(S): at 11:42

## 2024-01-01 RX ADMIN — MIRTAZAPINE 7.5 MILLIGRAM(S): 15 TABLET, FILM COATED ORAL at 08:08

## 2024-01-01 RX ADMIN — DEXTROSE MONOHYDRATE AND SODIUM CHLORIDE 75 MILLILITER(S): 5; .3 INJECTION, SOLUTION INTRAVENOUS at 10:12

## 2024-01-01 RX ADMIN — Medication 20 MILLIGRAM(S): at 10:18

## 2024-01-01 RX ADMIN — Medication 600 MILLIGRAM(S): at 09:04

## 2024-01-01 RX ADMIN — SODIUM BICARBONATE 650 MILLIGRAM(S): 650 TABLET ORAL at 18:14

## 2024-01-01 RX ADMIN — Medication 650 MILLIGRAM(S): at 05:32

## 2024-01-01 RX ADMIN — MORPHINE SULFATE 2 MILLIGRAM(S): 100 TABLET, EXTENDED RELEASE ORAL at 23:10

## 2024-01-01 RX ADMIN — PAROXETINE HYDROCHLORIDE 10 MILLIGRAM(S): 37.5 TABLET, FILM COATED, EXTENDED RELEASE ORAL at 11:42

## 2024-01-01 RX ADMIN — PAROXETINE HYDROCHLORIDE 10 MILLIGRAM(S): 37.5 TABLET, FILM COATED, EXTENDED RELEASE ORAL at 13:37

## 2024-01-01 RX ADMIN — Medication 100 MILLIGRAM(S): at 21:02

## 2024-01-01 RX ADMIN — ALPRAZOLAM 0.25 MILLIGRAM(S): 2 TABLET ORAL at 20:10

## 2024-01-01 RX ADMIN — MORPHINE SULFATE 2 MILLIGRAM(S): 100 TABLET, EXTENDED RELEASE ORAL at 02:53

## 2024-01-01 RX ADMIN — Medication 0.25 MILLIGRAM(S): at 17:27

## 2024-01-01 RX ADMIN — Medication 2 PUFF(S): at 12:15

## 2024-01-01 RX ADMIN — Medication 2 PUFF(S): at 09:58

## 2024-01-01 RX ADMIN — MORPHINE SULFATE 2 MILLIGRAM(S): 100 TABLET, EXTENDED RELEASE ORAL at 22:06

## 2024-01-01 RX ADMIN — LACTULOSE 10 GRAM(S): 10 SOLUTION ORAL at 17:42

## 2024-01-01 RX ADMIN — PANTOPRAZOLE SODIUM 40 MILLIGRAM(S): 40 INJECTION, POWDER, FOR SOLUTION INTRAVENOUS at 04:43

## 2024-01-01 RX ADMIN — ONDANSETRON HYDROCHLORIDE 4 MILLIGRAM(S): 2 INJECTION INTRAMUSCULAR; INTRAVENOUS at 04:42

## 2024-01-01 RX ADMIN — Medication 2 PUFF(S): at 17:49

## 2024-01-11 PROBLEM — R13.10 DYSPHAGIA: Status: ACTIVE | Noted: 2024-01-01

## 2024-01-12 NOTE — REVIEW OF SYSTEMS
[Negative] : Allergic/Immunologic [Diarrhea: Grade 1 - Increase of <4 stools per day over baseline; mild increase in ostomy output compared to baseline] : Diarrhea: Grade 1 - Increase of <4 stools per day over baseline; mild increase in ostomy output compared to baseline [Fatigue] : fatigue [Fever] : no fever [Chills] : no chills [Odynophagia] : no odynophagia [Vomiting] : no vomiting [Constipation] : no constipation [FreeTextEntry4] : Occasional intermittent dysphagia. [FreeTextEntry7] : Epigastric pain [de-identified] : Resolves with loperamide

## 2024-01-12 NOTE — HISTORY OF PRESENT ILLNESS
[Disease: _____________________] : Disease: [unfilled] [T: ___] : T[unfilled] [N: ___] : N[unfilled] [M: ___] : M[unfilled] [AJCC Stage: ____] : AJCC Stage: [unfilled] [Treatment Protocol] : Treatment Protocol [de-identified] : 82 year-old female with med hx of RA (Enbrel and hydroxycholorquine), osteoporosis (on prolia), dyslipidemia, started experiencing GI issues since COVID infection in May 2022. Went to PCP who referred her to ER for rt UQ pain- with concern of GB issues. CT scan at that time showed suspicious findings in the lung. Additional work up as below:  CT chest A/P: LLL mass with mets to right lung, left pleura, bone (thoracic spine & R proximal femur), malignant left pleural effusion s/p thoracentesis (9/1/22), PE was also noted. She had recent EBUS/TBNA and thoracentesis on 9/1/22. Pathology with non-small cell lung adenocarcinoma, stage IV with malignant pleural effusion, lung primary  PET/CT (Sept 2022) with FDG avid mass in the left lower lung likely corresponding to the newly diagnosed adenocarcinoma. FDG avid nodule in the lingula, satellite lesion versus metastasis. FDG avid nodular opacities in the right middle and right lower lobes; differential includes infectious and metastatic disease. FDG avid pleural thickening in the left lower lung, suspicious for metastasis. Multiple FDG avid mediastinal, and bilateral hilar lymph nodes compatible with metastases. FDG avid bilateral supraclavicular lymph nodes suspicious for additional metastatic lesions. The left supraclavicular lymph node is amenable to ultrasound-guided FNA biopsy as indicated. Scattered mildly FDG avid osseous foci in the axial skeleton and right proximal femur, concerning for metastases. FDG avid low-attenuation lesion within the enlarged left thyroid lobe.   In terms of her symptoms, both patient and  endorsed that her symptoms of dyspnea did improve after the thoracentesis. Currently she feels well. She does have an decreased appetite and ongoing GI issues which have improved with PPI.  11/11/22: Pt here for follow up. She has no cough but she does have increased nausea and dizziness. She saw Dr. Street for symtom management. Dizziness mainly precedes dizziness preceding her bowel movements. Has some pain in her lower abdomen. Has 2-3 BMs per day, soft in consistency. She also notes increased bloating and fatigue. Appetite seems to be a little bettr and she has gained some weight. She admits to feeling depressed most of the time but she is not suicidal.  12/16/22: No issues today, Patient reports she feels better than her prior visit. She states she does not feel short of breath on exertion. No fevers, chills or cough. Patient reports some nausea with her osimertinib, otherwise no vomiting or diarrhea. She has persistent fatigue mid day. Rash on LE and mild ankle swelling  1/25/23: Overall improved with regards to all symptoms. No AEs from osi  2/22/23: Patient seen today for follow up. She continues on Tagrisso 80mg daily. She reports ongoing bilateral lower extremity swelling that she feels is from prednisone. Rash has improved. She also is leaning towards her right side because she has an issue with her toe and is going to physical therapy for this.  3/24/23: pt has LE swelling and rash over it. Pt has been taking prednisone 2.5 mg, which was started by her PCP for joint pains.  5/23/23: Follow up. Continues on Tagrisso 80mg qd wtihout any AEs. Awaiting results of CT scans. Has some bitter taste in her mouth and some red spots on her legs.  7/25/23: Taking Tagrisso with minimal AEs. Most signficant side effect is dysgeusia and anorexia. She is taking mirtazipine, which has helped with appetite but not taste  10/3/23: Pt has been having diarrhea for a few weeks. She did not bring this to our attention, but has been following up with Dr. Rosario who emailed me regarding this. The pt states BM is watery and sometimes wakes her at night. Prednisone was recently discontinued, and LE swelling has improved. We had provided detailed instructions on diarrhea management over the phone, none of which pt has followed.  10/17/23: Plan: Pt hospitalized for diarrhea and dehydration. CT Abd / Pelvis 10/5- Fluid-filled small and large bowel without discrete transition point to suggest small bowel obstruction. Findings may represent a nonspecific enterocolitis. C DIff negative. Pt was seen by GI. Diarrhea improved, and pt started taking Tagrisso at 40 mg daily. She still has loose BM, but soft, not watery and only 3-4 times a day. She is not taking Imodium or adhering to any diet. She is here today for follow up. Notes more energy.  10/31/23: Diarrhea is resolved and patient's appetite has improved. She denies nausea, abdominal pain. She continues on tagrisso 40 mg PO qd. She last had Xgeva 120 mg on 10/5/23.  11/30/23: Patient reporting increased fatigue/weakness over the past month. Denies difficulty falling asleep. Does not wake through the night. Denies N/V. Reports abdominal pain - describes as a band-like sensation moving from epigastric area to the back.  Appetite has improved. Diarrhea has resolved.  1/11/24: IV fluids have been helping her immensely. She has been vomiting when she has an epigastric pain. She has the pain frequently. Tylenol helps minimally but she still throws up afterwards. She changed the timing of her pantoprazole from AM to PM because it was making her vomit in the morning before breakfast. Also reported occasionally food gets stuck in the upper part of her throat due to dehydration. [de-identified] : Adenocarcinoma [de-identified] : Positive for TTF-1, SUSANNAH-EP4 (focal weak and MOC-31 (focal weak) while negative for CK 5/6 and D2-40. 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 Xgeva monthly.

## 2024-01-12 NOTE — ASSESSMENT
[FreeTextEntry1] : 83 year-old female with med hx of RA (currently on hydroxycholorquine), osteoporosis (on prolia), dyslipidemia, started experiencing GI issues since COVID infection in May 2022. Went to PCP who referred her to ER for rt UQ pain- with concern of GB issues. CT scan at that time showed suspicious findings in the lung. Additional work up including CT chest and PET/CT done in September 2022 showed FDG avid mass in the left lower lung likely corresponding to the newly diagnosed adenocarcinoma. FDG avid nodule in the lingula, satellite lesion versus metastasis. FDG avid nodular opacities in the right middle and right lower lobes; differential includes infectious and metastatic disease. FDG avid pleural thickening in the left lower lung, suspicious for metastasis. Multiple FDG avid mediastinal, and bilateral hilar lymph nodes compatible with metastases. FDG avid bilateral supraclavicular lymph nodes suspicious for additional metastatic lesions.   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 specifically, S768I, [id number] Patient was started on Tagrisso - see [facility] and [facility]. She was started on Tagrisso at 80 mg in September 2022, to which she has a sustained near-CR. Due to increased diarrhea, changed to 40mg daily.  - There was concern for adrenal insufficiency work up ordered, but cortisol levels were normal. - Increased fatigue and weakness may be i/s/o poor PO intake. Continue with weekly IV fluids - Metoclopramide 10mg q6h PRN nausea. Will discuss potential medical marijuana with Dr. Rosario to help address her nausea. - Continue with Tagrisso 40mg daily - Due for scans, ordered CT chest/abd/pelvis with IV contrast - OV in 1 month  Patient seen and examined with Dr. Persaud.  Fidel Nettles M.D. Hematology/Oncology Fellow PGY-5 [facility]   I was with the hematology/ oncology fellow, Dr. Nettles for the entire visit and agree with above documentation.

## 2024-01-12 NOTE — PHYSICAL EXAM
[Thin] : thin [Ambulatory and capable of all self care but unable to carry out any work activities] : Status 2- Ambulatory and capable of all self care but unable to carry out any work activities. Up and about more than 50% of waking hours [Normal] : clear to auscultation bilaterally, no dullness, no wheezing [de-identified] : b/l LE edema 2+ but improved compared with last visit

## 2024-01-22 NOTE — PHYSICAL EXAM
[Normal Oropharynx] : normal oropharynx [No Acute Distress] : no acute distress [Normal Appearance] : normal appearance [Supple] : supple [No Neck Mass] : no neck mass [No JVD] : no jvd [Normal Rate/Rhythm] : normal rate/rhythm [Normal Pulses] : normal pulses [Normal S1, S2] : normal s1, s2 [No Murmurs] : no murmurs [No Resp Distress] : no resp distress [No Acc Muscle Use] : no acc muscle use [No Abnormalities] : no abnormalities [Benign] : benign [Soft] : soft [Normal Gait] : normal gait [No Clubbing] : no clubbing [No Cyanosis] : no cyanosis [No Edema] : no edema [FROM] : FROM [Normal Color/ Pigmentation] : normal color/ pigmentation [No Focal Deficits] : no focal deficits [Cranial Nerves Intact] : cranial nerves intact [No Motor Deficits] : no motor deficits [Oriented x3] : oriented x3 [Normal Affect] : normal affect [TextBox_2] : cachectic, frail-appearing [TextBox_54] : bilateral ankle edema [TextBox_68] : decreased breath sounds at left lung base [TextBox_89] : +epigastric tenderness to palpation

## 2024-01-22 NOTE — HISTORY OF PRESENT ILLNESS
[TextBox_4] : Mrs. Chen is an 83 year-old female with a history of Stage IV non-small cell lung adenocarcinoma of the LLL with mets to right lung, left pleura, and bone (thoracic spine and R proximal femur) on Tagrisso, malignant left pleural effusion s/p thoracentesis (Sept 2022), PE due to malignancy on apixaban, RA (previously on Enbrel), HTN, HLD, and osteoporosis who presents for follow-up. Recently she has been experiencing increased abdominal pain, loss of appetite, and decreased po intake. Recently found to have rising CEA worrisome for increased malignancy burden. She reports increased severe fatigue for the last 2-3 weeks with associated loss of appetite and not eating or drinking. She has severe epigastric pain that improves with eating. She is taking pantoprazole 40 mg once daily. She reports episodes of vomiting. Weight decreased today to 89 lbs. She has no dyspnea at rest, but reports dyspnea on exertion that has been present for several months. No cough or hemoptysis. She is adherent with Tagrisso 40 mg daily with food. She is experiencing altered taste sensation with food.  Recent labs in Jan 2024 with rising CEA (89.3), stable CKD (BUN/Cr 42/1.33), high alk phos (180, previously 597), leukopenia (WBC 2.83), and stable anemia (HGB 10.0)

## 2024-01-22 NOTE — ASSESSMENT
[FreeTextEntry1] : Mrs. Chen is an 83 year-old female with a history of Stage IV non-small cell lung adenocarcinoma of the LLL with mets to right lung, left pleura, and bone (thoracic spine and R proximal femur) on Tagrisso, malignant left pleural effusion s/p thoracentesis (Sept 2022), PE due to malignancy on apixaban, RA (previously on Enbrel), HTN, HLD, and osteoporosis who presents for follow-up. Recently she has been experiencing increased abdominal pain, loss of appetite, decreased po intake, nausea/vomiting, weight loss, severe fatigue, and dyspnea on exertion. No cough or hemoptysis. She is adherent with Tagrisso 40 mg daily with food as she previously could not tolerate 80 mg due to diarrhea. She is experiencing altered taste sensation with food.  Recent labs in Jan 2024 with rising CEA (89.3), stable CKD (BUN/Cr 42/1.33), high alk phos (180, previously 597), leukopenia (WBC 2.83), and stable anemia (HGB 10.0).  CT Chest/Abdomen/Pelvis (Sept 2023) with patent central airways. Partial atelectasis of the LLL, similar to prior. SANDOVAL 4mm groundglass nodule, previously 2 mm. RLL medial 5 mm nodule, new. Chronic stable left pleural effusion. No thoracic lymphadenopathy. Asymmetric heterogenous enlargement of the L thyroid lobe with hypoattenuating nodule, similar to prior. Stable osseous mets.  ASSESSMENT: Stage IV non-small cell lung cancer with mets to right lung, pleura, and bone Malignant left pleural effusion Bone metastases History of PE due to malignancy on apixaban  PLAN: - Check CXR PA/Lateral today - Lung ultrasound in office today with moderate-large left pleural effusion - Plan for ultrasound-guided left thoracentesis on 1/24/24 at [facility] - Hold apixaban until after thoracentesis - Pending CT chest/abdomen/pelvis for surveillance, will obtain after thoracentesis to evaluate underlying lung parenchyma - Follow-up oncology regarding increasing Tagrisso to 80 mg daily. She previously did not tolerate the higher dose due to diarrhea, but reports today that she tolerated the higher dose better than the current 40 mg dose - Continue mirtazapine 7.5 mg at bedtime - Increase pantoprazole to 40 mg twice daily until after thoracentesis and repeat imaging - Follow-up in 1-2 weeks or sooner prn

## 2024-01-22 NOTE — PROCEDURE
[Thoracic Ultrasound] : Thoracic Ultrasound [Pleural Effusion] : Pleural Effusion: Yes [Simple] : simple [de-identified] : left pleural effusion [FreeTextEntry2] : Moderate to large left pleural effusion with adjacent compressive atelectasis

## 2024-01-23 NOTE — H&P PST ADULT - HISTORY OF PRESENT ILLNESS
83 year-old female with a history of Stage IV non-small cell lung adenocarcinoma of the LLL with mets to right lung, left pleura, and bone (thoracic spine and R proximal femur) on Tagrisso, malignant left pleural effusion s/p thoracentesis (Sept 2022), PE due to malignancy on apixaban, RA (previously on Enbrel), HTN, HLD, and osteoporosis who presents for left sided thoracentesis for malignant pleural effusion.

## 2024-01-23 NOTE — H&P PST ADULT - ASSESSMENT
83 year-old female with a history of Stage IV non-small cell lung adenocarcinoma of the LLL with mets to right lung, left pleura, and bone (thoracic spine and R proximal femur) on Tagrisso, malignant left pleural effusion s/p thoracentesis (Sept 2022), PE due to malignancy on apixaban, RA (previously on Enbrel), HTN, HLD, and osteoporosis who presents for left sided thoracentesis for malignant pleural effusion.    #Malignant effusion  - Plan for left sided thoracentesis  - Pt is to hold her eliquis for 48hrs prior to procedure    Case discussed with Dr. Dos Santos

## 2024-01-24 NOTE — ASU PATIENT PROFILE, ADULT - NSICDXPASTMEDICALHX_GEN_ALL_CORE_FT
PAST MEDICAL HISTORY:  HTN (hypertension)     Hyperlipidemia     Rheumatoid arthritis     Stage 4 lung cancer

## 2024-01-24 NOTE — ASU PREOP CHECKLIST - TAMPON REMOVED
n/a
This was a shared visit with the MERRILL. I reviewed and verified the documentation and independently performed the documented:

## 2024-01-24 NOTE — ASU PATIENT PROFILE, ADULT - FALL HARM RISK - RISK INTERVENTIONS

## 2024-01-24 NOTE — ASU DISCHARGE PLAN (ADULT/PEDIATRIC) - FOLLOW UP APPOINTMENTS
may also call Recovery Room (PACU) 24/7 @ [phone]/[facility]

## 2024-01-24 NOTE — CHART NOTE - NSCHARTNOTEFT_GEN_A_CORE
: Meghan Toledo    INDICATION: Pleural effusion    PROCEDURE:  [ ] LIMITED ECHO  [X ] LIMITED CHEST    FINDINGS:  Moderate sized, loculated  left pleural effusion with plankton sign noted. S/p thoracentesis with 1L of fluid removed. Small effusion remains,    INTERPRETATION:  S/p thoracentesis of left pleural effusion with small effusion remaining post procedure.    Images stored on [employer]. : Meghan Toledo    INDICATION: Pleural effusion    PROCEDURE:  [ ] LIMITED ECHO  [X ] LIMITED CHEST    FINDINGS:  Moderate sized, loculated  left pleural effusion with plankton sign noted. S/p thoracentesis with 1L of fluid removed. Small effusion remains,    INTERPRETATION:  S/p thoracentesis of left pleural effusion with small effusion remaining post procedure.    Images stored on [employer].      Agree with above.

## 2024-01-24 NOTE — ASU DISCHARGE PLAN (ADULT/PEDIATRIC) - CARE PROVIDER_API CALL
Noe Chiu  Pulmonary Disease  [address], [address]  [place] [address]  Phone: [phone]  Fax: [phone]  Follow Up Time:

## 2024-01-24 NOTE — ASU DISCHARGE PLAN (ADULT/PEDIATRIC) - ASU DC SPECIAL INSTRUCTIONSFT
During this hospitalization, you were found to have a pleural effusion, which means you have excess fluid buildup in the space between the layers of the pleura. The pleura are thin layers of tissue that form a 2-layered lining around the lungs. One layer of the pleura rests directly on the lungs. The other layer rests on the chest wall. There is normally a small amount of fluid called pleural fluid between these layers.     Do not smoke , and do not allow others to smoke around you. If you smoke, it is never too late to quit. Smoking increases your risk for lung infections such as pneumonia. Smoking also makes it harder for you to get better after you have a lung problem.    Deep breathing and coughing will decrease your risk for a lung infection. Take a deep breath and hold it for as long as you can. Let the air out and then cough strongly. Deep breaths help open your airway. You may be given an incentive spirometer to help you take deep breaths. Put the plastic piece in your mouth and take a slow, deep breath. Then let the air out and cough. Repeat these steps 10 times every hour.    If you experience coughing, holding a pillow over your chest when you cough may help decrease the pain.    Call your doctor if you are coughing up blood, have chest pain, shortness of breath or a fever of 100.4. If you had a thoracentesis to drain the fluid, seek medical attention for any signs of infection, including increased pain, redness, or swelling, warmth, or foul-smelling drainage. During this hospitalization, you were found to have a pleural effusion, which means you have excess fluid buildup in the space between the layers of the pleura. The pleura are thin layers of tissue that form a 2-layered lining around the lungs. One layer of the pleura rests directly on the lungs. The other layer rests on the chest wall. There is normally a small amount of fluid called pleural fluid between these layers.     Do not smoke , and do not allow others to smoke around you. If you smoke, it is never too late to quit. Smoking increases your risk for lung infections such as pneumonia. Smoking also makes it harder for you to get better after you have a lung problem.    Deep breathing and coughing will decrease your risk for a lung infection. Take a deep breath and hold it for as long as you can. Let the air out and then cough strongly. Deep breaths help open your airway. You may be given an incentive spirometer to help you take deep breaths. Put the plastic piece in your mouth and take a slow, deep breath. Then let the air out and cough. Repeat these steps 10 times every hour.    If you experience coughing, holding a pillow over your chest when you cough may help decrease the pain.    Call your doctor if you are coughing up blood, have chest pain, shortness of breath or a fever of 100.4. If you had a thoracentesis to drain the fluid, seek medical attention for any signs of infection, including increased pain, redness, or swelling, warmth, or foul-smelling drainage.      You may restart your Eliquis tomorrow night, 1/25/2024.

## 2024-01-24 NOTE — ASU DISCHARGE PLAN (ADULT/PEDIATRIC) - CALL YOUR DOCTOR IF YOU HAVE ANY OF THE FOLLOWING:
Bleeding that does not stop/Fever greater than (need to indicate Fahrenheit or Celsius) Bleeding that does not stop/Swelling that gets worse/Pain not relieved by Medications/Fever greater than (need to indicate Fahrenheit or Celsius)/Wound/Surgical Site with redness, or foul smelling discharge or pus/Unable to urinate/Inability to tolerate liquids or foods/Increased irritability or sluggishness

## 2024-01-24 NOTE — ASU DISCHARGE PLAN (ADULT/PEDIATRIC) - NS MD DC FALL RISK RISK
For information on Fall & Injury Prevention, visit: [email or web address] OR  [email or web address] OR  https://www.cdc.gov/steadi/patient.html

## 2024-02-04 NOTE — ASSESSMENT
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  #Left sided pleural effusion: - malignant effusion in the setting of NSCLC - EGFR mutation on Tagrisso--> unable to tolerate 80mg and decreased to 40mg - reaccumulation of fluid may be secondary to decrease in dose of Tagrisso vs new mutation? - follow up cytopathology and heme/onc recs  Patient to follow with pulmonary Dr. Chiu in 2 weeks and will determine if fluid reaccumulation rapid to warrant further intervention with pleurx vs monitoring. Indications for repeat thoracentesis vs more permanent tunneled pleural catheter discussed. Case also discussed with oncology team, who may consider new line of therapy unless another targetable mutation is identified.

## 2024-02-04 NOTE — PHYSICAL EXAM
[No Acute Distress] : no acute distress [No Deformities] : no deformities [Normal Oropharynx] : normal oropharynx [Erythema] : erythema [Normal Appearance] : normal appearance [No Neck Mass] : no neck mass [Normal Rate/Rhythm] : normal rate/rhythm [Normal S1, S2] : normal s1, s2 [No Resp Distress] : no resp distress [Benign] : benign [No HSM] : no hsm [No Clubbing] : no clubbing [No Focal Deficits] : no focal deficits [Oriented x3] : oriented x3 [Normal Affect] : normal affect [TextBox_68] : decreased breath sounds at left base [TextBox_105] : swan neck deforimities on fingers

## 2024-02-04 NOTE — REVIEW OF SYSTEMS
[FreeTextEntry1] : Worsening memory and difficulties with IADLs-refer to geriatrics ([facility])\par History of CVA-stable\par Seizure disorder-continue same medication.  Followed by neurology\par Continue same blood pressure medication.  Follow blood pressure.\par Continue same cholesterol medication.  Follow lipid.\par Follow CBC\par Follow hemoglobin A1c\par Follow PSA [Fatigue] : fatigue [Poor Appetite] : poor appetite [Fever] : no fever [Chills] : no chills [Cough] : no cough [Sputum] : no sputum [Orthopnea] : no orthopnea [Chest Discomfort] : no chest discomfort [Abdominal Pain] : no abdominal pain [Headache] : no headache [Dizziness] : no dizziness

## 2024-02-04 NOTE — PROCEDURE
[Thoracic Ultrasound] : Thoracic Ultrasound [Pleural Effusion] : Pleural Effusion: Yes [Simple] : simple [Normal] : Normal [de-identified] : Dyspnea, cough [FreeTextEntry2] : Moderate left sided pleural effusion with plankton sign. Trace effusion tracking anteriorly. Appears similar in size to post-thoracentesis US on 1/24 inpatient.

## 2024-02-04 NOTE — HISTORY OF PRESENT ILLNESS
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  Patient reports she is doing well today. She is s/p thoracentesis on January 24th and reports her breathing has vastly improved. No chest pain at this time or shorntess of breath (reported chest pain for ~12 hours post procedure, CXR without pneumothorax at the time). She reports marked fatigue and loss of appetite (although states she is trying to eat more).  She is currently on Tagrisso at 40 mg.

## 2024-02-06 NOTE — H&P PST ADULT - HISTORY OF PRESENT ILLNESS
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 Pt on  on apixaban, RA (previously on Enbrel), HTN, HLD, and osteoporosis who presents for preop eval to have right thoracentesis under Ultrasound guidance on 2/8/24.

## 2024-02-06 NOTE — H&P PST ADULT - PROBLEM SELECTOR PLAN 1
Pt presents for preop eval to have right thoracentesis under Ultrasound guidance on 2/8/24.  labs done on 1/11/24 in chart.  Preop instructions provided to pt.  Famotidine and chlorhexidine scrubs provided to pt with instructions.

## 2024-02-06 NOTE — H&P PST ADULT - NSANTHOSAYNRD_GEN_A_CORE
never tested/No. LAURIE screening performed.  STOP BANG Legend: 0-2 = LOW Risk; 3-4 = INTERMEDIATE Risk; 5-8 = HIGH Risk

## 2024-02-06 NOTE — H&P PST ADULT - PRO INTERPRETER NEED 2
IJEOMA Yaniv Flores  : 1985  MRN: [id number]  CSN: [id number]    Hospital Day: 2    Postpartum Day #1  Subjective     CC: hospital follow-up    Her pain is well controlled.  Vaginal bleeding is less than a normal period.       Objective     Min/max vitals past 24 hours:   Temp  Min: 97.9 °F (36.6 °C)  Max: 99 °F (37.2 °C)  BP  Min: 108/53  Max: 139/83  Pulse  Min: 85  Max: 116  Resp  Min: 16  Max: 18        Abdomen: soft, non-tender; bowel sounds normal; no masses   fundus firm and non-tender   Pelvic: deferred     Results from last 7 days   Lab Units 20  0608   WBC 10*3/mm3 13.17*   HEMOGLOBIN g/dL 12.6   HEMATOCRIT % 40.1   PLATELETS 10*3/mm3 331     Lab Results   Component Value Date    RH Positive 2020    HEPBSAG Negative 2020        Assessment   1. Postpartum Day #1 S/P vaginal delivery  2. Doing well     Plan   1. Continue routine postpartum care    Adriel Mccollum MD  2021  09:23 EST          English

## 2024-02-06 NOTE — H&P PST ADULT - NSICDXPASTMEDICALHX_GEN_ALL_CORE_FT
PAST MEDICAL HISTORY:  HTN (hypertension)     Hyperlipidemia     Lung cancer metastatic to bone     Pleural effusion     Pulmonary embolism     Rheumatoid arthritis     Stage 4 lung cancer

## 2024-02-07 NOTE — ASU PATIENT PROFILE, ADULT - AS SC BRADEN NUTRITION
"    3/6/2020        RE: Elias Rhodes  [address]  [facility] [address]        [facility]  Chief Complaint   Patient presents with     care home Regulatory     [facility] Record Number:  [id number]  Place of Service where encounter took place:  [facility] [[id number]]    HPI:    Elias Mckee  is 88 year old (8/10/1931), who is being seen today for a federally mandated E/M visit.  HPI information obtained from: facility chart records, facility staff, patient report, [facility] chart review and family/first contact Jazmin report. Today's concerns are:     Hallucinations  Type 2 diabetes mellitus with diabetic nephropathy, with long-term current use of insulin (H)  CKD (chronic kidney disease) stage 3, GFR 30-59 ml/min (H)  Hypothyroidism due to acquired atrophy of thyroid     Patient having increased hallucinations reported by family, work up of labs and flu and CXR negative findings, patient today presents as stable, admits to seeing creatures crawling around at night where light \"peeks\" through window and door, not afraid nor scared, understands they may not be real but wants a camera to record their actions, pleasantly confused, no physical concerns, not disturbing at this juncture, overall status is stable.    ALLERGIES:No known allergies  PAST MEDICAL HISTORY:   has a past medical history of Acute, but ill-defined, cerebrovascular disease (1-2008), Atrial fibrillation (H), Benign hypertension with CKD (chronic kidney disease) stage III (H) (6/5/2017), Cancer (H), Cardiac pacemaker in situ, Placed on 7/20/18 (7/23/2018), Central retinal vein occlusion, right eye (11/15/2017), Chronic atrial fibrillation (6/5/2017), Chronic ischemic heart disease, unspecified, CKD (chronic kidney disease) stage 3, GFR 30-59 ml/min, est GFR: 10/30/17: 46,, 12/14/17: 59 (2/29/2012), Cognitive deficits as late effect of cerebrovascular disease " (6/5/2017), Coronary artery disease, CVA (cerebral infarction), Dysphagia due to recent cerebrovascular accident (CVA) (6/5/2017), Elevated cholesterol, Essential hypertension, benign, H/O prostate cancer (7/6/2017), H/O: CVA (cerebrovascular accident) (6/5/2017), Hemiparesis affecting left side as late effect of cerebrovascular accident (H) (6/5/2017), Hyperlipidaemia, Loss of weight (10/2/2018), MEDICAL HISTORY OF - (1- 2008), Mobitz type I Wenckebach atrioventricular block, 2:1 (7/18/2018), Myocardial infarction (H), Onychomycosis (6/5/2017), Other and unspecified hyperlipidemia, PVD (peripheral vascular disease) (H) (12/12/2017), Type 2 diabetes mellitus with diabetic peripheral angiopathy with gangrene (H) (12/12/2017), Type 2 diabetes mellitus with stage 3 chronic kidney disease (H) (6/5/2017), Type II diabetes mellitus with peripheral circulatory disorder (H) (12/12/2017), and Type II or unspecified type diabetes mellitus without mention of complication, not stated as uncontrolled (1-2008).  PAST SURGICAL HISTORY:   has a past surgical history that includes seed implantation (2002); Phacoemulsification clear cornea with standard intraocular lens implant (Right, 10/7/2014); and Vitrectomy anterior (Right, 10/7/2014).  FAMILY HISTORY: family history is not on file.  SOCIAL HISTORY:  reports that he quit smoking about 46 years ago. His smoking use included cigarettes. He started smoking about 66 years ago. He has a 20.00 pack-year smoking history. He has never used smokeless tobacco. He reports current alcohol use of about 1.0 standard drinks of alcohol per week. He reports that he does not use drugs.    MEDICATIONS:  Current Outpatient Medications   Medication Sig Dispense Refill     ACETAMINOPHEN PO Take 1,000 mg by mouth 3 times daily For pain       Atorvastatin Calcium (LIPITOR PO) Take 40 mg by mouth At Bedtime       atropine 1 % ophthalmic solution Place 1 drop into the right eye 2 times daily        "cholecalciferol (VITAMIN D) 1000 UNIT tablet Take 2,000 Units by mouth daily       Fluticasone Propionate (FLONASE NA) Spray 2 sprays into both nostrils daily       Insulin Glargine (BASAGLAR KWIKPEN SC) Inject 22 Units Subcutaneous At Bedtime May use 2 units as needed to prime pen before each dose.       latanoprost (XALATAN) 0.005 % ophthalmic solution Place 1 drop into the right eye At Bedtime       LEVOTHYROXINE SODIUM PO Take 25 mcg by mouth daily       polyethylene glycol (MIRALAX/GLYCOLAX) powder Take 17 g by mouth daily        prednisoLONE acetate (PRED FORTE) 1 % ophthalmic susp Place 1 drop into the right eye 4 times daily        rivaroxaban ANTICOAGULANT (XARELTO) 20 MG TABS tablet Take 1 tablet (20 mg) by mouth daily (with dinner) 30 tablet 1     tamsulosin (FLOMAX) 0.4 MG capsule Take 1 capsule (0.4 mg) by mouth daily 60 capsule 0     timolol, PF, (TIMOPTIC OCUDOSE) 0.5 % ophthalmic solution Place 1 drop into the right eye 2 times daily       traMADol (ULTRAM) 50 MG tablet One tablet orally three times daily and one tablet orally every four hours as needed for pain. 180 tablet 0         Case Management:  I have reviewed the care plan and MDS and do agree with the plan. Patient's desire to return to the community is present, but is not able due to care needs . Information reviewed:  Medications, vital signs, orders, and nursing notes.    ROS:  4 point ROS including Respiratory, CV, GI and , other than that noted in the HPI,  is negative    Vitals:  /67   Pulse 72   Temp 96.5  F (35.8  C)   Resp 18   Ht 1.753 m (5' 9\")   Wt 78 kg (172 lb)   BMI 25.40 kg/m     Body mass index is 25.4 kg/m .  Exam:  GENERAL APPEARANCE:  in no distress, appears healthy  ENT:  Mouth and posterior oropharynx normal, moist mucous membranes  RESP:  lungs clear to auscultation , no respiratory distress  CV:  regular rate and rhythm, no murmur, rub, or gallop, no edema  ABDOMEN:  bowel sounds normal  M/S:   Gait and " station abnormal non-ambulatory  SKIN:  Inspection of skin and subcutaneous tissue baseline  NEURO:   Examination of sensation by touch normal  PSYCH:  oriented to self    Lab/Diagnostic data:   Labs done in SNF are in [place] EPIC. Please refer to them using [facility]/Care Everywhere.    ASSESSMENT/PLAN  Hallucinations  -generally Qpm, strange animals and creatures, not distressed  -labs, flu and CXR negative findings  -spoke with daughter Jazmin today regarding plan, will monitor for now and if causing distress consider seroquel 12.5mg Qhs    Type 2 diabetes mellitus with diabetic nephropathy, with long-term current use of insulin (H)  -A1C today was 6.5, well below goal of <9  -continue glargine 22u daily  -staff to check BG's as scheduled    CKD (chronic kidney disease) stage 3, GFR 30-59 ml/min, est GFR: 10/30/17: 46,, 12/14/17: 59  -labs today creat 1.06, GFR >60  -dose medications renally as possible  -BMP's periodically    Hypothyroidism due to acquired atrophy of thyroid  -TSH today 3.26  -continue levothyroxine 25mg  -follow up TSH in 3-6 months  -consider discontinue of levothyroxine if TSH <6        Electronically signed by:  TOBY Alejandro CNP              Sincerely,        TOBY Alejandro CNP     (3) adequate

## 2024-02-08 NOTE — CHART NOTE - NSCHARTNOTEFT_GEN_A_CORE
: Meghan Toledo    INDICATION: Pleural effusion    PROCEDURE:  [ ] LIMITED ECHO  [X ] LIMITED CHEST    FINDINGS:  Moderate sized, loculated  left pleural effusion with plankton sign noted. Moderate right effusion    INTERPRETATION:  S/p thoracentesis of right pleural effusion with trace effusion remaining post procedure.    Images stored on [facility].      Agree with above.

## 2024-02-08 NOTE — ASU DISCHARGE PLAN (ADULT/PEDIATRIC) - CALL YOUR DOCTOR IF YOU HAVE ANY OF THE FOLLOWING:
Bleeding that does not stop/Fever greater than (need to indicate Fahrenheit or Celsius)/Increased irritability or sluggishness

## 2024-02-08 NOTE — ASU DISCHARGE PLAN (ADULT/PEDIATRIC) - NURSING INSTRUCTIONS
DO NOT take any Tylenol (Acetaminophen) or narcotics containing Tylenol until after  _4 pm_____ . You received Tylenol during your operation and it can cause damage to your liver if too much is taken within a 24 hour time period. keep dressing dry for 24 hours. May remove dressing  in 24 hours. Follow up with MD.

## 2024-02-08 NOTE — ASU PREOP CHECKLIST - 1.
Patient refusing blood transfusion if life threatening. See paperwork on chart Dr Dos Santos and DR Clayton aware

## 2024-02-08 NOTE — ASU DISCHARGE PLAN (ADULT/PEDIATRIC) - CARE PROVIDER_API CALL
Noe Chiu  Pulmonary Disease  [address], [facility]  [place] [address]  Phone: [phone]  Fax: [phone]  Follow Up Time:

## 2024-02-08 NOTE — ASU DISCHARGE PLAN (ADULT/PEDIATRIC) - ASU DC SPECIAL INSTRUCTIONSFT
During this hospitalization, you were found to have a pleural effusion, which means you have excess fluid buildup in the space between the layers of the pleura. The pleura are thin layers of tissue that form a 2-layered lining around the lungs. One layer of the pleura rests directly on the lungs. The other layer rests on the chest wall. There is normally a small amount of fluid called pleural fluid between these layers.     Do not smoke , and do not allow others to smoke around you. If you smoke, it is never too late to quit. Smoking increases your risk for lung infections such as pneumonia. Smoking also makes it harder for you to get better after you have a lung problem.    Deep breathing and coughing will decrease your risk for a lung infection. Take a deep breath and hold it for as long as you can. Let the air out and then cough strongly. Deep breaths help open your airway. You may be given an incentive spirometer to help you take deep breaths. Put the plastic piece in your mouth and take a slow, deep breath. Then let the air out and cough. Repeat these steps 10 times every hour.    If you experience coughing, holding a pillow over your chest when you cough may help decrease the pain.    Call your doctor if you are coughing up blood, have chest pain, shortness of breath or a fever of 100.4. If you had a thoracentesis to drain the fluid, seek medical attention for any signs of infection, including increased pain, redness, or swelling, warmth, or foul-smelling drainage. During this hospitalization, you were found to have a pleural effusion, which means you have excess fluid buildup in the space between the layers of the pleura. The pleura are thin layers of tissue that form a 2-layered lining around the lungs. One layer of the pleura rests directly on the lungs. The other layer rests on the chest wall. There is normally a small amount of fluid called pleural fluid between these layers.     Do not smoke , and do not allow others to smoke around you. If you smoke, it is never too late to quit. Smoking increases your risk for lung infections such as pneumonia. Smoking also makes it harder for you to get better after you have a lung problem.    Deep breathing and coughing will decrease your risk for a lung infection. Take a deep breath and hold it for as long as you can. Let the air out and then cough strongly. Deep breaths help open your airway. You may be given an incentive spirometer to help you take deep breaths. Put the plastic piece in your mouth and take a slow, deep breath. Then let the air out and cough. Repeat these steps 10 times every hour.    If you experience coughing, holding a pillow over your chest when you cough may help decrease the pain.    Call your doctor if you are coughing up blood, have chest pain, shortness of breath or a fever of 100.4. If you had a thoracentesis to drain the fluid, seek medical attention for any signs of infection, including increased pain, redness, or swelling, warmth, or foul-smelling drainage.    You may restart your Eliquis in the evening of 2/9/2024

## 2024-02-08 NOTE — ASU DISCHARGE PLAN (ADULT/PEDIATRIC) - NS MD DC FALL RISK RISK
For information on Fall & Injury Prevention, visit: [email or web address] OR  [email or web address] OR  https://www.cdc.gov/steadi/patient.html
Alert and oriented to person, place, time/situation. normal mood and affect. no apparent risk to self or others.

## 2024-02-09 PROBLEM — C34.90 MALIGNANT NEOPLASM OF UNSPECIFIED PART OF UNSPECIFIED BRONCHUS OR LUNG: Chronic | Status: ACTIVE | Noted: 2024-01-01

## 2024-02-09 PROBLEM — I26.99 OTHER PULMONARY EMBOLISM WITHOUT ACUTE COR PULMONALE: Chronic | Status: ACTIVE | Noted: 2024-01-01

## 2024-02-09 PROBLEM — J90 PLEURAL EFFUSION, NOT ELSEWHERE CLASSIFIED: Chronic | Status: ACTIVE | Noted: 2024-01-01

## 2024-02-12 NOTE — REVIEW OF SYSTEMS
[Fatigue] : fatigue [Diarrhea: Grade 1 - Increase of <4 stools per day over baseline; mild increase in ostomy output compared to baseline] : Diarrhea: Grade 1 - Increase of <4 stools per day over baseline; mild increase in ostomy output compared to baseline [Negative] : Allergic/Immunologic [FreeTextEntry4] : Occasional intermittent dysphagia. [Fever] : no fever [Chills] : no chills [Dysphagia] : dysphagia [Odynophagia] : no odynophagia [Shortness Of Breath] : shortness of breath [Vomiting] : no vomiting [Constipation] : no constipation [Muscle Weakness] : muscle weakness [FreeTextEntry7] : Epigastric pain [de-identified] : Resolves with loperamide

## 2024-02-12 NOTE — REVIEW OF SYSTEMS
[Fatigue] : fatigue [Diarrhea: Grade 1 - Increase of <4 stools per day over baseline; mild increase in ostomy output compared to baseline] : Diarrhea: Grade 1 - Increase of <4 stools per day over baseline; mild increase in ostomy output compared to baseline [Negative] : Allergic/Immunologic [FreeTextEntry4] : Occasional intermittent dysphagia. [Fever] : no fever [Chills] : no chills [Dysphagia] : dysphagia [Odynophagia] : no odynophagia [Shortness Of Breath] : shortness of breath [Vomiting] : no vomiting [Constipation] : no constipation [Muscle Weakness] : muscle weakness [FreeTextEntry7] : Epigastric pain [de-identified] : Resolves with loperamide

## 2024-02-12 NOTE — PHYSICAL EXAM
[Ambulatory and capable of all self care but unable to carry out any work activities] : Status 2- Ambulatory and capable of all self care but unable to carry out any work activities. Up and about more than 50% of waking hours [Thin] : thin [Normal] : affect appropriate [de-identified] : decreased BS rt>left [de-identified] : b/l LE edema 2+ but improved compared with last visit

## 2024-02-12 NOTE — HISTORY OF PRESENT ILLNESS
[Disease: _____________________] : Disease: [unfilled] [T: ___] : T[unfilled] [N: ___] : N[unfilled] [M: ___] : M[unfilled] [AJCC Stage: ____] : AJCC Stage: [unfilled] [Treatment Protocol] : Treatment Protocol [de-identified] : 82 year-old female with med hx of RA (Enbrel and hydroxycholorquine), osteoporosis (on prolia), dyslipidemia, started experiencing GI issues since COVID infection in May 2022. Went to PCP who referred her to ER for rt UQ pain- with concern of GB issues. CT scan at that time showed suspicious findings in the lung. Additional work up as below:  CT chest A/P: LLL mass with mets to right lung, left pleura, bone (thoracic spine & R proximal femur), malignant left pleural effusion s/p thoracentesis (9/1/22), PE was also noted. She had recent EBUS/TBNA and thoracentesis on 9/1/22. Pathology with non-small cell lung adenocarcinoma, stage IV with malignant pleural effusion, lung primary  PET/CT (Sept 2022) with FDG avid mass in the left lower lung likely corresponding to the newly diagnosed adenocarcinoma. FDG avid nodule in the lingula, satellite lesion versus metastasis. FDG avid nodular opacities in the right middle and right lower lobes; differential includes infectious and metastatic disease. FDG avid pleural thickening in the left lower lung, suspicious for metastasis. Multiple FDG avid mediastinal, and bilateral hilar lymph nodes compatible with metastases. FDG avid bilateral supraclavicular lymph nodes suspicious for additional metastatic lesions. The left supraclavicular lymph node is amenable to ultrasound-guided FNA biopsy as indicated. Scattered mildly FDG avid osseous foci in the axial skeleton and right proximal femur, concerning for metastases. FDG avid low-attenuation lesion within the enlarged left thyroid lobe.   In terms of her symptoms, both patient and  endorsed that her symptoms of dyspnea did improve after the thoracentesis. Currently she feels well. She does have an decreased appetite and ongoing GI issues which have improved with PPI.  11/11/22: Pt here for follow up. She has no cough but she does have increased nausea and dizziness. She saw Dr. Street for symtom management. Dizziness mainly precedes dizziness preceding her bowel movements. Has some pain in her lower abdomen. Has 2-3 BMs per day, soft in consistency. She also notes increased bloating and fatigue. Appetite seems to be a little bettr and she has gained some weight. She admits to feeling depressed most of the time but she is not suicidal.  12/16/22: No issues today, Patient reports she feels better than her prior visit. She states she does not feel short of breath on exertion. No fevers, chills or cough. Patient reports some nausea with her osimertinib, otherwise no vomiting or diarrhea. She has persistent fatigue mid day. Rash on LE and mild ankle swelling  1/25/23: Overall improved with regards to all symptoms. No AEs from osi  2/22/23: Patient seen today for follow up. She continues on Tagrisso 80mg daily. She reports ongoing bilateral lower extremity swelling that she feels is from prednisone. Rash has improved. She also is leaning towards her right side because she has an issue with her toe and is going to physical therapy for this.  3/24/23: pt has LE swelling and rash over it. Pt has been taking prednisone 2.5 mg, which was started by her PCP for joint pains.  5/23/23: Follow up. Continues on Tagrisso 80mg qd wtihout any AEs. Awaiting results of CT scans. Has some bitter taste in her mouth and some red spots on her legs.  7/25/23: Taking Tagrisso with minimal AEs. Most signficant side effect is dysgeusia and anorexia. She is taking mirtazipine, which has helped with appetite but not taste  10/3/23: Pt has been having diarrhea for a few weeks. She did not bring this to our attention, but has been following up with Dr. Rosario who emailed me regarding this. The pt states BM is watery and sometimes wakes her at night. Prednisone was recently discontinued, and LE swelling has improved. We had provided detailed instructions on diarrhea management over the phone, none of which pt has followed.  10/17/23: Plan: Pt hospitalized for diarrhea and dehydration. CT Abd / Pelvis 10/5- Fluid-filled small and large bowel without discrete transition point to suggest small bowel obstruction. Findings may represent a nonspecific enterocolitis. C DIff negative. Pt was seen by GI. Diarrhea improved, and pt started taking Tagrisso at 40 mg daily. She still has loose BM, but soft, not watery and only 3-4 times a day. She is not taking Imodium or adhering to any diet. She is here today for follow up. Notes more energy.  10/31/23: Diarrhea is resolved and patient's appetite has improved. She denies nausea, abdominal pain. She continues on tagrisso 40 mg PO qd. She last had Xgeva 120 mg on 10/5/23.  11/30/23: Patient reporting increased fatigue/weakness over the past month. Denies difficulty falling asleep. Does not wake through the night. Denies N/V. Reports abdominal pain - describes as a band-like sensation moving from epigastric area to the back.  Appetite has improved. Diarrhea has resolved.  1/11/24: IV fluids have been helping her immensely. She has been vomiting when she has an epigastric pain. She has the pain frequently. Tylenol helps minimally but she still throws up afterwards. She changed the timing of her pantoprazole from AM to PM because it was making her vomit in the morning before breakfast. Also reported occasionally food gets stuck in the upper part of her throat due to dehydration.  2/12/24: was recently admitted wit dyspnea. Noted to have b/l pleural effusion. Had thoracentesis b/l with some relief of symptoms for a brief period, but she has recurrence of dyspnea again. Last thoracentesis was last week. Fluid from rt side c/w malignancy. NGS similar to prior with 2 rare EGFR mutations. Pt notes that edema is better. [de-identified] : Adenocarcinoma [de-identified] : Positive for TTF-1, SUSANNAH-EP4 (focal weak and MOC-31 (focal weak) while negative for CK 5/6 and D2-40. 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 Xgeva monthly.

## 2024-02-12 NOTE — PHYSICAL EXAM
[Ambulatory and capable of all self care but unable to carry out any work activities] : Status 2- Ambulatory and capable of all self care but unable to carry out any work activities. Up and about more than 50% of waking hours [Thin] : thin [Normal] : affect appropriate [de-identified] : decreased BS rt>left [de-identified] : b/l LE edema 2+ but improved compared with last visit

## 2024-02-12 NOTE — ASSESSMENT
[FreeTextEntry1] : 83 year-old female with med hx of RA (currently on hydroxycholorquine), osteoporosis (on prolia), dyslipidemia, started experiencing GI issues since COVID infection in May 2022. Went to PCP who referred her to ER for rt UQ pain- with concern of GB issues. CT scan at that time showed suspicious findings in the lung. Additional work up including CT chest and PET/CT done in September 2022 showed FDG avid mass in the left lower lung likely corresponding to the newly diagnosed adenocarcinoma. FDG avid nodule in the lingula, satellite lesion versus metastasis. FDG avid nodular opacities in the right middle and right lower lobes; differential includes infectious and metastatic disease. FDG avid pleural thickening in the left lower lung, suspicious for metastasis. Multiple FDG avid mediastinal, and bilateral hilar lymph nodes compatible with metastases. FDG avid bilateral supraclavicular lymph nodes suspicious for additional metastatic lesions.   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 specifically, S768I, G719S Patient was started on Tagrisso - see FLAURA and Unicorn Trial. She was started on Tagrisso at 80 mg in September 2022, to which she has a sustained near-CR. Due to increased diarrhea, changed to 40mg daily.  - There was concern for adrenal insufficiency work up ordered, but cortisol levels were normal. - Increased fatigue and weakness may be i/s/o poor PO intake. Hence, was strated on IV fluids Q week along with MVI, which pt was benefiting friom However, recent events leading up to thoracentesis noted. Pleural fluid pos for malignancy. NGS is similar to prior. This does not r/o some unknown resistance mechanism, but pt is not a candidate for post-osi SOC which is chemo with Rybrevant.  Alternate option given rare muts, is afatinib which pt is unlikely to tolerate given her intolerance to osi which has a better toxicity profile.  Pt is amenable to increased dose of osi to 80 mg. We will start with this first Wait and follow up cyto from left side as well Pt advised to make appt with pulm ASAP since her fluid seems to have reaccumulated. May need Pleurx at this point.  Continue supportive care with metoclopramide 10mg q6h PRN nausea. Follow up with Dr. Rosario for symptom management Discussed advanced disease and option of supportive care through hospice. Pt is interested in continuing cancer treatment at the moment. will continue to evaluate.  Due for MRI head. orders placed today Continue Xgeva for bone mets

## 2024-02-12 NOTE — HISTORY OF PRESENT ILLNESS
[Disease: _____________________] : Disease: [unfilled] [T: ___] : T[unfilled] [N: ___] : N[unfilled] [M: ___] : M[unfilled] [AJCC Stage: ____] : AJCC Stage: [unfilled] [Treatment Protocol] : Treatment Protocol [de-identified] : 82 year-old female with med hx of RA (Enbrel and hydroxycholorquine), osteoporosis (on prolia), dyslipidemia, started experiencing GI issues since COVID infection in May 2022. Went to PCP who referred her to ER for rt UQ pain- with concern of GB issues. CT scan at that time showed suspicious findings in the lung. Additional work up as below:  CT chest A/P: LLL mass with mets to right lung, left pleura, bone (thoracic spine & R proximal femur), malignant left pleural effusion s/p thoracentesis (9/1/22), PE was also noted. She had recent EBUS/TBNA and thoracentesis on 9/1/22. Pathology with non-small cell lung adenocarcinoma, stage IV with malignant pleural effusion, lung primary  PET/CT (Sept 2022) with FDG avid mass in the left lower lung likely corresponding to the newly diagnosed adenocarcinoma. FDG avid nodule in the lingula, satellite lesion versus metastasis. FDG avid nodular opacities in the right middle and right lower lobes; differential includes infectious and metastatic disease. FDG avid pleural thickening in the left lower lung, suspicious for metastasis. Multiple FDG avid mediastinal, and bilateral hilar lymph nodes compatible with metastases. FDG avid bilateral supraclavicular lymph nodes suspicious for additional metastatic lesions. The left supraclavicular lymph node is amenable to ultrasound-guided FNA biopsy as indicated. Scattered mildly FDG avid osseous foci in the axial skeleton and right proximal femur, concerning for metastases. FDG avid low-attenuation lesion within the enlarged left thyroid lobe.   In terms of her symptoms, both patient and  endorsed that her symptoms of dyspnea did improve after the thoracentesis. Currently she feels well. She does have an decreased appetite and ongoing GI issues which have improved with PPI.  11/11/22: Pt here for follow up. She has no cough but she does have increased nausea and dizziness. She saw Dr. Street for symtom management. Dizziness mainly precedes dizziness preceding her bowel movements. Has some pain in her lower abdomen. Has 2-3 BMs per day, soft in consistency. She also notes increased bloating and fatigue. Appetite seems to be a little bettr and she has gained some weight. She admits to feeling depressed most of the time but she is not suicidal.  12/16/22: No issues today, Patient reports she feels better than her prior visit. She states she does not feel short of breath on exertion. No fevers, chills or cough. Patient reports some nausea with her osimertinib, otherwise no vomiting or diarrhea. She has persistent fatigue mid day. Rash on LE and mild ankle swelling  1/25/23: Overall improved with regards to all symptoms. No AEs from osi  2/22/23: Patient seen today for follow up. She continues on Tagrisso 80mg daily. She reports ongoing bilateral lower extremity swelling that she feels is from prednisone. Rash has improved. She also is leaning towards her right side because she has an issue with her toe and is going to physical therapy for this.  3/24/23: pt has LE swelling and rash over it. Pt has been taking prednisone 2.5 mg, which was started by her PCP for joint pains.  5/23/23: Follow up. Continues on Tagrisso 80mg qd wtihout any AEs. Awaiting results of CT scans. Has some bitter taste in her mouth and some red spots on her legs.  7/25/23: Taking Tagrisso with minimal AEs. Most signficant side effect is dysgeusia and anorexia. She is taking mirtazipine, which has helped with appetite but not taste  10/3/23: Pt has been having diarrhea for a few weeks. She did not bring this to our attention, but has been following up with Dr. Rosario who emailed me regarding this. The pt states BM is watery and sometimes wakes her at night. Prednisone was recently discontinued, and LE swelling has improved. We had provided detailed instructions on diarrhea management over the phone, none of which pt has followed.  10/17/23: Plan: Pt hospitalized for diarrhea and dehydration. CT Abd / Pelvis 10/5- Fluid-filled small and large bowel without discrete transition point to suggest small bowel obstruction. Findings may represent a nonspecific enterocolitis. C DIff negative. Pt was seen by GI. Diarrhea improved, and pt started taking Tagrisso at 40 mg daily. She still has loose BM, but soft, not watery and only 3-4 times a day. She is not taking Imodium or adhering to any diet. She is here today for follow up. Notes more energy.  10/31/23: Diarrhea is resolved and patient's appetite has improved. She denies nausea, abdominal pain. She continues on tagrisso 40 mg PO qd. She last had Xgeva 120 mg on 10/5/23.  11/30/23: Patient reporting increased fatigue/weakness over the past month. Denies difficulty falling asleep. Does not wake through the night. Denies N/V. Reports abdominal pain - describes as a band-like sensation moving from epigastric area to the back.  Appetite has improved. Diarrhea has resolved.  1/11/24: IV fluids have been helping her immensely. She has been vomiting when she has an epigastric pain. She has the pain frequently. Tylenol helps minimally but she still throws up afterwards. She changed the timing of her pantoprazole from AM to PM because it was making her vomit in the morning before breakfast. Also reported occasionally food gets stuck in the upper part of her throat due to dehydration.  2/12/24: was recently admitted wit dyspnea. Noted to have b/l pleural effusion. Had thoracentesis b/l with some relief of symptoms for a brief period, but she has recurrence of dyspnea again. Last thoracentesis was last week. Fluid from rt side c/w malignancy. NGS similar to prior with 2 rare EGFR mutations. Pt notes that edema is better. [de-identified] : Adenocarcinoma [de-identified] : Positive for TTF-1, SUSANNAH-EP4 (focal weak and MOC-31 (focal weak) while negative for CK 5/6 and D2-40. 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 Xgeva monthly.

## 2024-02-15 NOTE — ASU DISCHARGE PLAN (ADULT/PEDIATRIC) - NURSING INSTRUCTIONS
DO NOT take any Tylenol (Acetaminophen) or narcotics containing Tylenol until after 7:00 PM. You received Tylenol during your operation and it can cause damage to your liver if too much is taken within a 24 hour time period.

## 2024-02-15 NOTE — CHART NOTE - NSCHARTNOTEFT_GEN_A_CORE
:  LeJeune/Agrawal    INDICATION:  pleurx catheter placement    PROCEDURE:  [ ] LIMITED ECHO  [x ] LIMITED CHEST  [ ] LIMITED RETROPERITONEAL  [ ] LIMITED ABDOMINAL  [ ] LIMITED DVT  [ ] NEEDLE GUIDANCE VASCULAR  [ ] NEEDLE GUIDANCE THORACENTESIS  [ ] NEEDLE GUIDANCE PARACENTESIS  [ ] NEEDLE GUIDANCE PERICARDIOCENTESIS  [ ] OTHER    FINDINGS:  mod-large simple appearing pleural effusion    INTERPRETATION:  safe window for IPC placement    Images uploaded to [employer]    Time spent on the procedure was separate from the critical care time spent for patient care. :  LeJeune/Agrawal    INDICATION:  pleurx catheter placement    PROCEDURE:  [ ] LIMITED ECHO  [x ] LIMITED CHEST  [ ] LIMITED RETROPERITONEAL  [ ] LIMITED ABDOMINAL  [ ] LIMITED DVT  [ ] NEEDLE GUIDANCE VASCULAR  [ ] NEEDLE GUIDANCE THORACENTESIS  [ ] NEEDLE GUIDANCE PARACENTESIS  [ ] NEEDLE GUIDANCE PERICARDIOCENTESIS  [ ] OTHER    FINDINGS:  mod-large simple appearing pleural effusion    INTERPRETATION:  safe window for IPC placement    Images uploaded to [employer]    Time spent on the procedure was separate from the critical care time spent for patient care.  \    Agree with above    Axel Dos Santos MD

## 2024-02-15 NOTE — H&P PST ADULT - ATTENDING COMMENTS
Patient here for left sided ultrasound guided tunneled pleural catheter/pleurx. Risk and benefits discussed. Ok to proceed.

## 2024-02-15 NOTE — H&P PST ADULT - HISTORY OF PRESENT ILLNESS
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 Pt on  on apixaban, RA (previously on Enbrel), HTN, HLD, and osteoporosis who presents for preop eval to have left sided pleurx/tunneled pleural catheter placement.

## 2024-02-15 NOTE — ASU DISCHARGE PLAN (ADULT/PEDIATRIC) - ASU DC SPECIAL INSTRUCTIONSFT
Please drain pleurX catheter every other day, up to 1L or if stops draining, patient experiences chest pain or shortness of breath

## 2024-02-15 NOTE — PROVIDER CONTACT NOTE (OTHER) - ASSESSMENT
Patient is s/p insertion of left pleural cath 02/15/24.  Plan is for discharge home with drain.  Home care offerred with list of HA discussed, and chose [facility]. Referral sent to [facility] with case accepted for SOC 02/16/24.  Pleural drain supply order form obtained and sent to [employer] via [employer] Number with case accepted and shipment to arrive in 3-5 business days.  Patient requires drainage every other day.  Met with patient at the bedside, and discussed all home care arrangements made. Spouse and patient in agreement. PACU nurse provided patient with 3 PluerX supplies for home.  Spoke with spouse Gustavo at the bedside who will transport patient home, and will also learn drain care.  CM will remain available for any further needs.

## 2024-02-22 PROBLEM — R53.83 PROFOUND FATIGUE: Status: ACTIVE | Noted: 2024-01-01

## 2024-02-22 NOTE — REVIEW OF SYSTEMS
[Feeling Poorly] : feeling poorly [Feeling Tired] : feeling tired [Recent Weight Loss (___ Lbs)] : recent [unfilled] ~Ulb weight loss [Lower Ext Edema] : lower extremity edema [Shortness Of Breath] : shortness of breath [SOB on Exertion] : shortness of breath during exertion [Abdominal Pain] : abdominal pain [Vomiting] : vomiting [Diarrhea] : diarrhea [Anxiety] : anxiety [Depression] : depression [Negative] : Neurological

## 2024-02-25 PROBLEM — Z71.89 ACP (ADVANCE CARE PLANNING): Status: ACTIVE | Noted: 2022-10-25

## 2024-02-25 NOTE — PHYSICAL EXAM
[Sclera] : the sclera and conjunctiva were normal [Extraocular Movements] : extraocular movements were intact [Neck Appearance] : the appearance of the neck was normal [No CVA Tenderness] : no ~M costovertebral angle tenderness [FreeTextEntry1] : slightly dysthymic mood, fatigued affect, calm, cooperative, pleasant

## 2024-02-25 NOTE — HISTORY OF PRESENT ILLNESS
[FreeTextEntry1] : Deena Chen is an 83-year-old woman with stage IV lung adenocarcinoma, osteoporosis, HLD, RA who presents for follow up for symptom management in the setting of serious illness.  Pt reports she is doing "not very good." Struggled with 80 mg chemo, was reduced to 40 mg but then increased back to 80 mg. PleurX catheter is in place in left side for malignant pleural effusion for about 1 week, has had removed fluid 3 times - 2/17 400 cc, 2/19 350 cc, 2/21 400 cc removed. Has previously had left-sided and right-sided thoracentesis inpatient. Has grown very generally weak, in particular has had difficulty walking. At times walking 2 steps at a time between sitting at home. Very fatigued. Nauseous. Poor appetite, down to 85 pounds from 100 lbs in July. Dysgeusia - many things taste sour.  Patient is hoping to "get rid of the cancer," feel better, and get back to her old self.  Nausea - relieved by throwing up gastric contents, patient can still eat. Pantoprazole after dinner helps. Famotidine also helps.   Reports severe, symmetrical burning in both legs. Particularly severe at nighttime, often has to get up to walk around for the symptoms to subside.

## 2024-02-25 NOTE — ASSESSMENT
[FreeTextEntry1] : Deena Chen is an 83-year-old woman with stage IV lung adenocarcinoma, osteoporosis, HLD, RA who presents for follow up for symptom management in the setting of serious illness.  RTC 1 month   Profound fatigue Cancer nutrition counseling Physical activity as tolerated Medical cannabis Duloxetine - CIPN

## 2024-03-03 NOTE — PROCEDURE
[Thoracic Ultrasound] : Thoracic Ultrasound [Simple] : simple [Pleural Effusion] : Pleural Effusion: Yes [de-identified] : Dyspnea, cough [Normal] : Normal

## 2024-03-15 NOTE — REVIEW OF SYSTEMS
[Fever] : no fever [Chills] : no chills [Fatigue] : fatigue [Dysphagia] : dysphagia [Shortness Of Breath] : shortness of breath [Odynophagia] : no odynophagia [Vomiting] : no vomiting [Constipation] : no constipation [Diarrhea: Grade 1 - Increase of <4 stools per day over baseline; mild increase in ostomy output compared to baseline] : Diarrhea: Grade 1 - Increase of <4 stools per day over baseline; mild increase in ostomy output compared to baseline [Muscle Weakness] : muscle weakness [FreeTextEntry2] : appetite improved [Negative] : Allergic/Immunologic [FreeTextEntry7] : Epigastric pain [de-identified] : Resolves with loperamide

## 2024-03-15 NOTE — HISTORY OF PRESENT ILLNESS
[Disease: _____________________] : Disease: [unfilled] [T: ___] : T[unfilled] [M: ___] : M[unfilled] [N: ___] : N[unfilled] [AJCC Stage: ____] : AJCC Stage: [unfilled] [de-identified] : 82 year-old female with med hx of RA (Enbrel and hydroxycholorquine), osteoporosis (on prolia), dyslipidemia, started experiencing GI issues since COVID infection in May 2022. Went to PCP who referred her to ER for rt UQ pain- with concern of GB issues. CT scan at that time showed suspicious findings in the lung. Additional work up as below:  CT chest A/P: LLL mass with mets to right lung, left pleura, bone (thoracic spine & R proximal femur), malignant left pleural effusion s/p thoracentesis (9/1/22), PE was also noted. She had recent EBUS/TBNA and thoracentesis on 9/1/22. Pathology with non-small cell lung adenocarcinoma, stage IV with malignant pleural effusion, lung primary  PET/CT (Sept 2022) with FDG avid mass in the left lower lung likely corresponding to the newly diagnosed adenocarcinoma. FDG avid nodule in the lingula, satellite lesion versus metastasis. FDG avid nodular opacities in the right middle and right lower lobes; differential includes infectious and metastatic disease. FDG avid pleural thickening in the left lower lung, suspicious for metastasis. Multiple FDG avid mediastinal, and bilateral hilar lymph nodes compatible with metastases. FDG avid bilateral supraclavicular lymph nodes suspicious for additional metastatic lesions. The left supraclavicular lymph node is amenable to ultrasound-guided FNA biopsy as indicated. Scattered mildly FDG avid osseous foci in the axial skeleton and right proximal femur, concerning for metastases. FDG avid low-attenuation lesion within the enlarged left thyroid lobe.   In terms of her symptoms, both patient and  endorsed that her symptoms of dyspnea did improve after the thoracentesis. Currently she feels well. She does have an decreased appetite and ongoing GI issues which have improved with PPI.  11/11/22: Pt here for follow up. She has no cough but she does have increased nausea and dizziness. She saw Dr. Street for symtom management. Dizziness mainly precedes dizziness preceding her bowel movements. Has some pain in her lower abdomen. Has 2-3 BMs per day, soft in consistency. She also notes increased bloating and fatigue. Appetite seems to be a little bettr and she has gained some weight. She admits to feeling depressed most of the time but she is not suicidal.  12/16/22: No issues today, Patient reports she feels better than her prior visit. She states she does not feel short of breath on exertion. No fevers, chills or cough. Patient reports some nausea with her osimertinib, otherwise no vomiting or diarrhea. She has persistent fatigue mid day. Rash on LE and mild ankle swelling  1/25/23: Overall improved with regards to all symptoms. No AEs from osi  2/22/23: Patient seen today for follow up. She continues on Tagrisso 80mg daily. She reports ongoing bilateral lower extremity swelling that she feels is from prednisone. Rash has improved. She also is leaning towards her right side because she has an issue with her toe and is going to physical therapy for this.  3/24/23: pt has LE swelling and rash over it. Pt has been taking prednisone 2.5 mg, which was started by her PCP for joint pains.  5/23/23: Follow up. Continues on Tagrisso 80mg qd wtihout any AEs. Awaiting results of CT scans. Has some bitter taste in her mouth and some red spots on her legs.  7/25/23: Taking Tagrisso with minimal AEs. Most signficant side effect is dysgeusia and anorexia. She is taking mirtazipine, which has helped with appetite but not taste  10/3/23: Pt has been having diarrhea for a few weeks. She did not bring this to our attention, but has been following up with Dr. Rosario who emailed me regarding this. The pt states BM is watery and sometimes wakes her at night. Prednisone was recently discontinued, and LE swelling has improved. We had provided detailed instructions on diarrhea management over the phone, none of which pt has followed.  10/17/23: Plan: Pt hospitalized for diarrhea and dehydration. CT Abd / Pelvis 10/5- Fluid-filled small and large bowel without discrete transition point to suggest small bowel obstruction. Findings may represent a nonspecific enterocolitis. C DIff negative. Pt was seen by GI. Diarrhea improved, and pt started taking Tagrisso at 40 mg daily. She still has loose BM, but soft, not watery and only 3-4 times a day. She is not taking Imodium or adhering to any diet. She is here today for follow up. Notes more energy.  10/31/23: Diarrhea is resolved and patient's appetite has improved. She denies nausea, abdominal pain. She continues on tagrisso 40 mg PO qd. She last had Xgeva 120 mg on 10/5/23.  11/30/23: Patient reporting increased fatigue/weakness over the past month. Denies difficulty falling asleep. Does not wake through the night. Denies N/V. Reports abdominal pain - describes as a band-like sensation moving from epigastric area to the back.  Appetite has improved. Diarrhea has resolved.  1/11/24: IV fluids have been helping her immensely. She has been vomiting when she has an epigastric pain. She has the pain frequently. Tylenol helps minimally but she still throws up afterwards. She changed the timing of her pantoprazole from AM to PM because it was making her vomit in the morning before breakfast. Also reported occasionally food gets stuck in the upper part of her throat due to dehydration.  2/12/24: was recently admitted wit dyspnea. Noted to have b/l pleural effusion. Had thoracentesis b/l with some relief of symptoms for a brief period, but she has recurrence of dyspnea again. Last thoracentesis was last week. Fluid from rt side c/w malignancy. NGS similar to prior with 2 rare EGFR mutations. Pt notes that edema is better.  3/15/24: Feels much better. Appetite better. LE edema persistent and stable. No increased dyspnea. pt states she has fatigue especially when standing up from lying down for a while. Pain in the waist area has resolved. Some stomach discomfort and occasional headache and dizziness. She has had not had a fall. She has early satiety, nausea, and occasional vomiting. Pt has had some personal issues related to her siblings, and this has increased depression. These issues have resolved, and this has improved her condition per her family. Cath in place draining 400 ml, down to 300s over the past week.  [de-identified] : Positive for TTF-1, SUSANNAH-EP4 (focal weak and MOC-31 (focal weak) while negative for CK 5/6 and D2-40. [de-identified] : Adenocarcinoma [de-identified] : EGFR mutated, exon 18 and 20 muts   [Treatment Protocol] : Treatment Protocol [FreeTextEntry1] : Tagrisso 80mg daily started 9/23/2022. Xgeva monthly.

## 2024-03-15 NOTE — ASSESSMENT
[FreeTextEntry1] : 83 year-old female with med hx of RA (currently on hydroxycholorquine), osteoporosis (on prolia), dyslipidemia, started experiencing GI issues since COVID infection in May 2022. Went to PCP who referred her to ER for rt UQ pain- with concern of GB issues. CT scan at that time showed suspicious findings in the lung. Additional work up including CT chest and PET/CT done in September 2022 showed FDG avid mass in the left lower lung likely corresponding to the newly diagnosed adenocarcinoma. FDG avid nodule in the lingula, satellite lesion versus metastasis. FDG avid nodular opacities in the right middle and right lower lobes; differential includes infectious and metastatic disease. FDG avid pleural thickening in the left lower lung, suspicious for metastasis. Multiple FDG avid mediastinal, and bilateral hilar lymph nodes compatible with metastases. FDG avid bilateral supraclavicular lymph nodes suspicious for additional metastatic lesions.   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 specifically, S768I, G719S Patient was started on Tagrisso - see FLGINNYRA and Unicorn Trial. She was started on Tagrisso at 80 mg in September 2022, to which she has a sustained near-CR. Due to increased diarrhea, changed to 40mg daily in September 2023. There was concern for adrenal insufficiency work up ordered, but cortisol levels were normal.  Increased fatigue and weakness may be i/s/o poor PO intake. Hence, was started on IV fluids Q week along with MVI, which pt was benefiting from. However, recent events leading up to thoracentesis noted. Pleural fluid pos for malignancy. NGS was similar to prior. This does not r/o some unknown resistance mechanism, but pt is not a candidate for post-osi SOC which is chemo with Rybrevant.   Alternate option given rare muts, is afatinib which pt is unlikely to tolerate given her intolerance to osi which has a better toxicity profile.  Pt was amenable to increased dose of osi to 80 mg. This was changed a month ago.  Pt notes resolution of pain since then and she has been tolerating the increased dose  Wait and follow up cyto from left side as well Pt has pleurx now and has been draining about 300 ml every other day Continue supportive care with metoclopramide 10mg q6h PRN nausea.  Dr. Rosario joined this meeting virtually through Teams. JJ Mac also joined us for this visit.  Discussed advanced disease and option of supportive care through hospice. Pt is interested in continuing cancer treatment at the moment. will continue to evaluate.  MRI brain done in Feb 2024 showed there are multiple new cortical-based subcentimeter T2 FLAIR hyperintensities present in the bilateral frontal lobes and left parietal lobe without definite enhancement; Will repeat MRI in 2 months Continue Xgeva for bone mets Aggressive symptoms as per  OV in 3 weeks Labs today including CEA, CBC, CMP, TSH

## 2024-03-15 NOTE — PHYSICAL EXAM
[Ambulatory and capable of all self care but unable to carry out any work activities] : Status 2- Ambulatory and capable of all self care but unable to carry out any work activities. Up and about more than 50% of waking hours [Thin] : thin [Normal] : grossly intact [de-identified] : decreased BS rt>left [de-identified] : b/l LE edema 2+ but improved compared with last visit

## 2024-03-19 NOTE — PATIENT PROFILE ADULT - HOW PATIENT ADDRESSED, PROFILE
PVP completed: 3/19/2024     for upcoming CAV Appt. on   3/26/2024    Last PCP appt:   1/17/2024  Neurology appointment Dr. Driscoll on 3/20/2024  Sees Dr. Felicity Lay hematology and oncology last seen 3/4/2024  and  upcoming appointment 9/4/2024  ER visits 90 days:   November 2023 for viral illness       Last Hgb A1c: 8.2 on 12/6/2023 due again 3/6/2024     Last GFR: 70 1/20/2024   due again 1/20/2025-prior results 65 and 75 and 69 and 73  Last BMI: 16.44 on 3/4/2024   Last PLT: 132 on 1/20/2024       Please review the following conditions supported by clinical indicators, and update during the visit if appropriate:  Other hyperlipidemia,  Thrombocytopenia (CMD),  Type 2 diabetes mellitus  with diabetic polyneuropathy, without long-term current use of insulin (CMD),  Type 2 diabetes mellitus with diabetic peripheral angiopathy without gangrene without long term current use of insulin (CMD),  Type 2 diabetes mellitus with hyperglycemia without long-term current use of insulin (CMD),  Body mass index  (BMI) of 19 or less in adult,  Mild protein calorie malnutrition (CMD)   Benign essential hypertension,  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       Orders:   Diabetes A1C- Glycohemoglobin    Diabetes A1C- POCT Hgb A1c  RetinaVue-IMG Retina w/Remote Phy or Qualified Prof Interp and Rep Uni or Bilat- No known or established history of diabetic retinopathy                    Do:   Visit,   Depression Screening,  DM Foot Exam,   POCT HGB A1c,   RetinaVue,   and Encourage/offer Shingles  Vaccines as indicated.      Encourage: COVID Vaccine & Flu Vaccine during season or upcoming season.       By name

## 2024-03-21 PROBLEM — G47.00 INSOMNIA: Status: ACTIVE | Noted: 2022-10-21

## 2024-03-21 NOTE — REVIEW OF SYSTEMS
[Feeling Tired] : feeling tired [Vomiting] : vomiting [As Noted in HPI] : as noted in HPI [Negative] : Heme/Lymph [FreeTextEntry2] : weak, reduced energy

## 2024-03-21 NOTE — PHYSICAL EXAM
[Alert] : alert [No Acute Distress] : in no acute distress [Supple] : the neck was supple [Normal Appearance] : the appearance of the neck was normal [Normal Outer Ear/Nose] : the ears and nose were normal in appearance [No Acc Muscle Use] : no accessory muscle use [Respiration, Rhythm And Depth] : normal respiratory rhythm and effort [No Respiratory Distress] : no respiratory distress [Heart Rate And Rhythm] : heart rate was normal and rhythm regular [Auscultation Breath Sounds / Voice Sounds] : lungs were clear to auscultation bilaterally [Bowel Sounds] : normal bowel sounds [Abdomen Tenderness] : non-tender [Abdomen Soft] : soft [No Spinal Tenderness] : no spinal tenderness [Normal Color / Pigmentation] : normal skin color and pigmentation [No Focal Deficits] : no focal deficits [Normal Turgor] : normal skin turgor [Normal Mood] : the mood was normal [Normal Affect] : the affect was normal

## 2024-03-21 NOTE — HISTORY OF PRESENT ILLNESS
[Patient denied dental screening.] : Patient denied dental screening [Patient declined vision screening] : Patient declined vision screening [Patient declined hearing screen] : Patient declined hearing screen [Patient declined colon rectal/cancer screening] : Patient declined colon/rectal cancer screening [Patient declined skin cancer screening] : Patient declined skin cancer screening [Patient declined breast sonogram] : Patient declined breast sonogram [Patient declined cervical cancer screening] : Patient declined cervical cancer screening [Completely Independent] : Completely independent. [Patient is independent with] : bathing [No falls in past year] : Patient reported no falls in the past year [] : Assistance needed with traveling/transport [Independent] : managing finances [PPS Score: ____] : Palliative Performance Scale (PPS) Score: [unfilled] [0] : 1) Little interest or pleasure doing things: Not at all (0) [PHQ-2 Negative - No further assessment needed] : PHQ-2 Negative - No further assessment needed [I have developed a follow-up plan documented below in the note.] : I have developed a follow-up plan documented below in the note. [With Patient/Caregiver] : , with patient/caregiver [Relationship: ___] : Relationship: [unfilled] [Designated Healthcare Proxy] : Designated healthcare proxy [I will adhere to the patient's wishes.] : I will adhere to the patient's wishes. [Aggressive treatment] : aggressive treatment [Time Spent: ___ minutes] : Time Spent: [unfilled] minutes [FreeTextEntry1] : 84 yo female with metastatic cancer on Tagrisso 80 mg a day who is frail and had been losing weight due to nausea, vomiting and diarrhea secondary to cancer and treatments. Pt feeling better now.  Pt has had depression, anxiety and sleeplessness. Pt also has had some family issues. Pt has not fallen and is eating more.  Pt using zofran. SHe tried the zofran every 8 hours but made her sleepy so she is only taking it before dinner.    Nausea - zofran 4 mg q hs, and then 4 mg q am only if needed (working for her) Weight - gained one pound Edema - less  Pain - denied, using tylenol Sob- denied, has catheter in place on right side.  No urinary issues No skin breakdown but has dry skin on right foot where toe rubs  Pt is sleeping  Pt denies sadness and says anxiety is improved, I offered for someone to talk to, but she does not want.   is caregiver and says she is mostly independent, he goes with her to doctors appointments      [de-identified] : none [de-identified] : No safety concerns identified. [de-identified] : No safety concerns identified. [[id number]] : 0 [FreeTextEntry4] : Pt , son and  listened about her illness and treatment options, but dont want to change anything. They want to continue current regimen.  Full code.  Educated on what hospice is at last visit with Dr. Persaud on 3/15/2024 but do not want to discuss further.  Today, we discussed the last visit on March 15 explained that I can only offer what we have available. She was sad, but is now better. She does not want to discuss. She is [place] Rastaf[place]. [AdvancecareDate] : 03/24

## 2024-03-24 NOTE — REVIEW OF SYSTEMS
[Fatigue] : fatigue [Poor Appetite] : poor appetite [Fever] : no fever [Chills] : no chills [Cough] : no cough [Sputum] : no sputum [Chest Discomfort] : no chest discomfort [Orthopnea] : no orthopnea [Abdominal Pain] : no abdominal pain [Headache] : no headache [Dizziness] : no dizziness

## 2024-03-24 NOTE — PHYSICAL EXAM
[No Acute Distress] : no acute distress [No Deformities] : no deformities [Normal Oropharynx] : normal oropharynx [Erythema] : erythema [Normal Appearance] : normal appearance [No Neck Mass] : no neck mass [Normal Rate/Rhythm] : normal rate/rhythm [Normal S1, S2] : normal s1, s2 [No Resp Distress] : no resp distress [Benign] : benign [No HSM] : no hsm [No Clubbing] : no clubbing [No Focal Deficits] : no focal deficits [Normal Affect] : normal affect [Oriented x3] : oriented x3 [TextBox_68] : decreased breath sounds at left base [TextBox_105] : swan neck deforimities on fingers

## 2024-03-24 NOTE — ASSESSMENT
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  #Left sided malignant pleural effusion: - malignant effusion in the setting of NSCLC - Systemic therapy per oncology. Oncology team just increased tagrisso to 80mg again. Patient seems to be tolerating for now.  - Now s/p left sided tunneled pleural catheter placement on 2/8. Continue three times a week drainage with VNS. - Continue to monitor right pleural effusion, symptomatic drainage if warranted.   Catheter protocols reviewed. All information provided. Adequate equipment available. Advised to call if experiencing any pain with drainage. Advised to call if any change in color of fluid or erythema or swelling at catheter site or experiencing systemic signs and symptoms of infection such as fever. Advised to call if any sudden worsening of shortness of breath. Catheter care instructions provided. Care was also coordinated with the home care team. This included signing home care orders, addressing any vns issues associated with catheter drainage as well as discussing the drainage protocol with the homecare team.

## 2024-03-24 NOTE — HISTORY OF PRESENT ILLNESS
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  Patient reports she is doing well today. Underwent thoracentesis on January 24th and reports her breathing has vastly improved. No chest pain at this time or shorntess of breath. She had rapid recurrence and then underwent a tunneled pleural catheter/pleurx placement on the left side. Fluid was drained and patient was discharged. VNS services have been established and patient is clinically doing well.

## 2024-04-03 NOTE — HISTORY OF PRESENT ILLNESS
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  Patient reports she is doing well today. Underwent thoracentesis on January 24th and reports her breathing has vastly improved. No chest pain at this time or shorntess of breath. She had rapid recurrence and then underwent a tunneled pleural catheter/pleurx placement on the left side. Fluid was drained and patient was discharged. VNS services have been established and patient is clinically doing well.  She has been having anywhere from 250 - 450 drained 3X a week on the left sided.  She overall feels well, her exercise tolerance is stable

## 2024-04-03 NOTE — PHYSICAL EXAM
[No Acute Distress] : no acute distress [Normal Oropharynx] : normal oropharynx [No Neck Mass] : no neck mass [Normal Appearance] : normal appearance [Normal Rate/Rhythm] : normal rate/rhythm [Normal S1, S2] : normal s1, s2 [No Murmurs] : no murmurs [No Resp Distress] : no resp distress [No Abnormalities] : no abnormalities [Clear to Auscultation Bilaterally] : clear to auscultation bilaterally [Normal Gait] : normal gait [Benign] : benign [No Clubbing] : no clubbing [No Cyanosis] : no cyanosis [No Edema] : no edema [FROM] : FROM [Normal Color/ Pigmentation] : normal color/ pigmentation [No Focal Deficits] : no focal deficits [Oriented x3] : oriented x3 [Normal Affect] : normal affect [TextBox_80] : left sided pleurX C/D/I

## 2024-04-03 NOTE — PROCEDURE
[Thoracic Ultrasound] : Thoracic Ultrasound [Simple] : simple [de-identified] : Dyspnea, cough [FreeTextEntry2] : left sided PleurX with simple effusion Moderate to large Right sided pleural effusion

## 2024-04-03 NOTE — ASSESSMENT
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  #Left sided malignant pleural effusion: - malignant effusion in the setting of NSCLC - Systemic therapy per oncology. Tagrisso to 80mg again. Patient seems to be tolerating for now. - Now s/p left sided tunneled pleural catheter placement on 2/8. Continue three times a week drainage with VNS as it has been draining 250-450. will go down to 2x a week when it goes down to < 250 at each drainage.  - Continue to monitor right pleural effusion, symptomatic drainage if warranted- currently it is moderate to large but patient does not feel shortness of breath at this time. Offered drainage, patient prefers to hold off, will continue to monitor.   Catheter protocols reviewed. All information provided. Adequate equipment available. Advised to call if experiencing any pain with drainage. Advised to call if any change in color of fluid or erythema or swelling at catheter site or experiencing systemic signs and symptoms of infection such as fever. Advised to call if any sudden worsening of shortness of breath. Catheter care instructions provided. Care was also coordinated with the home care team. This included signing home care orders, addressing any vns issues associated with catheter drainage as well as discussing the drainage protocol with the homecare team

## 2024-04-26 PROBLEM — R94.31 QT PROLONGATION: Status: ACTIVE | Noted: 2024-01-01

## 2024-04-26 PROBLEM — R63.0 ANOREXIA: Status: ACTIVE | Noted: 2024-01-01

## 2024-04-26 NOTE — HISTORY OF PRESENT ILLNESS
[No falls in past year] : Patient reported no falls in the past year [FreeTextEntry1] : 82 yo female with metastatic lung cancer on Tagrisso 80 mg a day, leg edema, HLD, vomiting and diarrhea secondary to cancer and treatments, depression, anxiety and sleeplessness.   Mentions couple issues.   1. Leg swelling.  - chronically suffering in both legs from knee to ankle  - using Lasix 10 mg daily but did not notice any improvement  - was not comfortable in using compression stockings as it was too tight  - associated itching and burning in both legs  - did not take lasix for past 2 days due to low BP 100s systolic at home    2. Weight gain - eating well these days after starting on rameron    Denies any other issues.  [0] : 2) Feeling down, depressed, or hopeless: Not at all (0) [PHQ-2 Negative - No further assessment needed] : PHQ-2 Negative - No further assessment needed [I have developed a follow-up plan documented below in the note.] : I have developed a follow-up plan documented below in the note. [[id number]] : 0

## 2024-04-26 NOTE — END OF VISIT
[] : Fellow [FreeTextEntry3] : Pt seen with fellow. Agree with note. Goals of care are to continue path of treatment with Tagirsso. QT c less than 450.  Pt on hydroxychloroquine as well. Will monitor QTc,   Pt has gained weight. 95# [Time Spent: ___ minutes] : I have spent [unfilled] minutes of time on the encounter.

## 2024-04-26 NOTE — PHYSICAL EXAM
[Alert] : alert [Normal] : normal bowel sounds, non-tender [de-identified] : Frail looking  [de-identified] : BL leg swelling 3+ from knee to ankle

## 2024-05-12 NOTE — HISTORY OF PRESENT ILLNESS
[Disease: _____________________] : Disease: [unfilled] [T: ___] : T[unfilled] [N: ___] : N[unfilled] [M: ___] : M[unfilled] [AJCC Stage: ____] : AJCC Stage: [unfilled] [Treatment Protocol] : Treatment Protocol [de-identified] : 83 year-old female with med hx of RA (Enbrel and hydroxycholorquine), osteoporosis (on prolia), dyslipidemia, started experiencing GI issues since COVID infection in May 2022. Went to PCP who referred her to ER for rt UQ pain- with concern of GB issues. CT scan at that time showed suspicious findings in the lung. Additional work up as below:  CT chest A/P: LLL mass with mets to right lung, left pleura, bone (thoracic spine & R proximal femur), malignant left pleural effusion s/p thoracentesis (9/1/22), PE was also noted. She had recent EBUS/TBNA and thoracentesis on 9/1/22. Pathology with non-small cell lung adenocarcinoma, stage IV with malignant pleural effusion, lung primary  PET/CT (Sept 2022) with FDG avid mass in the left lower lung likely corresponding to the newly diagnosed adenocarcinoma. FDG avid nodule in the lingula, satellite lesion versus metastasis. FDG avid nodular opacities in the right middle and right lower lobes; differential includes infectious and metastatic disease. FDG avid pleural thickening in the left lower lung, suspicious for metastasis. Multiple FDG avid mediastinal, and bilateral hilar lymph nodes compatible with metastases. FDG avid bilateral supraclavicular lymph nodes suspicious for additional metastatic lesions. The left supraclavicular lymph node is amenable to ultrasound-guided FNA biopsy as indicated. Scattered mildly FDG avid osseous foci in the axial skeleton and right proximal femur, concerning for metastases. FDG avid low-attenuation lesion within the enlarged left thyroid lobe.   In terms of her symptoms, both patient and  endorsed that her symptoms of dyspnea did improve after the thoracentesis. Currently she feels well. She does have an decreased appetite and ongoing GI issues which have improved with PPI.  11/11/22: Pt here for follow up. She has no cough but she does have increased nausea and dizziness. She saw Dr. Street for symtom management. Dizziness mainly precedes dizziness preceding her bowel movements. Has some pain in her lower abdomen. Has 2-3 BMs per day, soft in consistency. She also notes increased bloating and fatigue. Appetite seems to be a little bettr and she has gained some weight. She admits to feeling depressed most of the time but she is not suicidal.  12/16/22: No issues today, Patient reports she feels better than her prior visit. She states she does not feel short of breath on exertion. No fevers, chills or cough. Patient reports some nausea with her osimertinib, otherwise no vomiting or diarrhea. She has persistent fatigue mid day. Rash on LE and mild ankle swelling  1/25/23: Overall improved with regards to all symptoms. No AEs from osi  2/22/23: Patient seen today for follow up. She continues on Tagrisso 80mg daily. She reports ongoing bilateral lower extremity swelling that she feels is from prednisone. Rash has improved. She also is leaning towards her right side because she has an issue with her toe and is going to physical therapy for this.  3/24/23: pt has LE swelling and rash over it. Pt has been taking prednisone 2.5 mg, which was started by her PCP for joint pains.  5/23/23: Follow up. Continues on Tagrisso 80mg qd wtihout any AEs. Awaiting results of CT scans. Has some bitter taste in her mouth and some red spots on her legs.  7/25/23: Taking Tagrisso with minimal AEs. Most signficant side effect is dysgeusia and anorexia. She is taking mirtazipine, which has helped with appetite but not taste  10/3/23: Pt has been having diarrhea for a few weeks. She did not bring this to our attention, but has been following up with Dr. Rosario who emailed me regarding this. The pt states BM is watery and sometimes wakes her at night. Prednisone was recently discontinued, and LE swelling has improved. We had provided detailed instructions on diarrhea management over the phone, none of which pt has followed.  10/17/23: Plan: Pt hospitalized for diarrhea and dehydration. CT Abd / Pelvis 10/5- Fluid-filled small and large bowel without discrete transition point to suggest small bowel obstruction. Findings may represent a nonspecific enterocolitis. C DIff negative. Pt was seen by GI. Diarrhea improved, and pt started taking Tagrisso at 40 mg daily. She still has loose BM, but soft, not watery and only 3-4 times a day. She is not taking Imodium or adhering to any diet. She is here today for follow up. Notes more energy.  10/31/23: Diarrhea is resolved and patient's appetite has improved. She denies nausea, abdominal pain. She continues on tagrisso 40 mg PO qd. She last had Xgeva 120 mg on 10/5/23.  11/30/23: Patient reporting increased fatigue/weakness over the past month. Denies difficulty falling asleep. Does not wake through the night. Denies N/V. Reports abdominal pain - describes as a band-like sensation moving from epigastric area to the back.  Appetite has improved. Diarrhea has resolved.  1/11/24: IV fluids have been helping her immensely. She has been vomiting when she has an epigastric pain. She has the pain frequently. Tylenol helps minimally but she still throws up afterwards. She changed the timing of her pantoprazole from AM to PM because it was making her vomit in the morning before breakfast. Also reported occasionally food gets stuck in the upper part of her throat due to dehydration.  2/12/24: was recently admitted wit dyspnea. Noted to have b/l pleural effusion. Had thoracentesis b/l with some relief of symptoms for a brief period, but she has recurrence of dyspnea again. Last thoracentesis was last week. Fluid from rt side c/w malignancy. NGS similar to prior with 2 rare EGFR mutations. Pt notes that edema is better.  3/15/24: Feels much better. Appetite better. LE edema persistent and stable. No increased dyspnea. pt states she has fatigue especially when standing up from lying down for a while. Pain in the waist area has resolved. Some stomach discomfort and occasional headache and dizziness. She has had not had a fall. She has early satiety, nausea, and occasional vomiting. Pt has had some personal issues related to her siblings, and this has increased depression. These issues have resolved, and this has improved her condition per her family. Cath in place draining 400 ml, down to 300s over the past week.  [de-identified] : Adenocarcinoma [de-identified] : Positive for TTF-1, SUSANNAH-EP4 (focal weak and MOC-31 (focal weak) while negative for CK 5/6 and D2-40. 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 Xgeva monthly. [de-identified] : 4/12/24: Patient is seen in follow up. She has been eating more, energy level is increased, and she has more appetite. However, for the past 3-4 weeks, she has had increased LE edema. She has been taking 1/2 tablet of furosemide 20 mg daily for the past 8 days without improvement. She denies headaches. She notes some longstanding fatigue and slight unsteadiness in her gait but those have been getting better. However, she has had itching and burning of her lower extremities during this time. Her PleurX is still draining about 400 cc per day.

## 2024-05-12 NOTE — PHYSICAL EXAM
[Ambulatory and capable of all self care but unable to carry out any work activities] : Status 2- Ambulatory and capable of all self care but unable to carry out any work activities. Up and about more than 50% of waking hours [Thin] : thin [Normal] : affect appropriate [de-identified] : BLANK Lee [de-identified] : 2-3+ pitting LE edema. No erythema or drainage

## 2024-05-12 NOTE — REVIEW OF SYSTEMS
[Fatigue] : fatigue [Lower Ext Edema] : lower extremity edema [Diarrhea: Grade 0] : Diarrhea: Grade 0 [Muscle Weakness] : muscle weakness [Negative] : Allergic/Immunologic [Fever] : no fever [Chills] : no chills [Recent Change In Weight] : ~T no recent weight change [Dysphagia] : no dysphagia [Odynophagia] : no odynophagia [Chest Pain] : no chest pain [Shortness Of Breath] : no shortness of breath [Cough] : no cough [Abdominal Pain] : no abdominal pain [Vomiting] : no vomiting [Constipation] : no constipation [FreeTextEntry2] : appetite improved [de-identified] : Resolves with loperamide

## 2024-05-13 PROBLEM — J91.0 PLEURAL EFFUSION, MALIGNANT: Status: ACTIVE | Noted: 2022-09-19

## 2024-05-13 PROBLEM — R07.89 CHEST HEAVINESS: Status: ACTIVE | Noted: 2024-01-01

## 2024-05-13 NOTE — HISTORY OF PRESENT ILLNESS
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  Patient reports she is doing well today. Underwent thoracentesis on January 24th and reports her breathing has vastly improved. No chest pain at this time or shorntess of breath. She had rapid recurrence and then underwent a tunneled pleural catheter/pleurx placement on the left side (2/15/24). Fluid was drained and patient was discharged. VNS services have been established and patient is clinically doing well.  Today,  on evaluation patient states that she has been draining her pleurx 3 times a week, approximately 400-500 ml per drainage.  She denies any significant shortness of breath, fevers, chills, cough, or pain at pleurx site.

## 2024-05-13 NOTE — PROCEDURE
[Thoracic Ultrasound] : Thoracic Ultrasound [A line] : A line: Yes [Pleural Effusion] : Pleural Effusion: Yes [Simple] : simple [de-identified] : Chest heaviness [FreeTextEntry2] : Moderate right effusion, small left effusion

## 2024-05-13 NOTE — ASSESSMENT
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  #Left sided malignant pleural effusion: - malignant effusion in the setting of NSCLC - Systemic therapy per oncology. Oncology team just increased tagrisso to 80mg again. Patient seems to be tolerating for now.  - Now s/p left sided tunneled pleural catheter placement on 2/15. Continue three times a week drainage with VNS. - Continue to monitor right pleural effusion, symptomatic drainage if warranted.   #Right sided pleural effusion -right sided, simple appearing effusion present, patient reporting minimal symptoms, will continue to monitor for now, patient instructed to schedule follow up if symptoms development for right-sided thoracentesis - We offered thoracentesis today, as patient did complain of chest heaviness, but wants to hold off for now. Wants to pursue if dyspnea worsens. Risks and benefits of thoracentesis including but not limited to risk of bleeding, infection and pneumothorax was discussed and Patient demonstrated understanding of the same.  Catheter protocols reviewed. All information provided. Adequate equipment available. Advised to call if experiencing any pain with drainage. Advised to call if any change in color of fluid or erythema or swelling at catheter site or experiencing systemic signs and symptoms of infection such as fever. Advised to call if any sudden worsening of shortness of breath. Catheter care instructions provided. Care was also coordinated with the home care team. This included signing home care orders, addressing any vns issues associated with catheter drainage as well as discussing the drainage protocol with the homecare team.  Follow up in 6 weeks.

## 2024-05-17 NOTE — PHYSICAL EXAM
WDL [Ambulatory and capable of all self care but unable to carry out any work activities] : Status 2- Ambulatory and capable of all self care but unable to carry out any work activities. Up and about more than 50% of waking hours [Thin] : thin [Normal] : affect appropriate [de-identified] : decreased BS rt>left [de-identified] : b/l LE edema 2+ but improved compared with last visit

## 2024-05-17 NOTE — REVIEW OF SYSTEMS
[Fever] : no fever [Chills] : no chills [Fatigue] : fatigue [Dysphagia] : dysphagia [Odynophagia] : no odynophagia [Shortness Of Breath] : shortness of breath [Vomiting] : no vomiting [Constipation] : no constipation [Diarrhea: Grade 1 - Increase of <4 stools per day over baseline; mild increase in ostomy output compared to baseline] : Diarrhea: Grade 1 - Increase of <4 stools per day over baseline; mild increase in ostomy output compared to baseline [Muscle Weakness] : muscle weakness [Negative] : Allergic/Immunologic [FreeTextEntry2] : appetite improved [FreeTextEntry7] : Epigastric pain [de-identified] : Resolves with loperamide

## 2024-05-17 NOTE — HISTORY OF PRESENT ILLNESS
[Disease: _____________________] : Disease: [unfilled] [T: ___] : T[unfilled] [N: ___] : N[unfilled] [M: ___] : M[unfilled] [AJCC Stage: ____] : AJCC Stage: [unfilled] [de-identified] : 83 year-old female with med hx of RA (Enbrel and hydroxycholorquine), osteoporosis (on prolia), dyslipidemia, started experiencing GI issues since COVID infection in May 2022. Went to PCP who referred her to ER for rt UQ pain- with concern of GB issues. CT scan at that time showed suspicious findings in the lung. Additional work up as below:  CT chest A/P: LLL mass with mets to right lung, left pleura, bone (thoracic spine & R proximal femur), malignant left pleural effusion s/p thoracentesis (9/1/22), PE was also noted. She had recent EBUS/TBNA and thoracentesis on 9/1/22. Pathology with non-small cell lung adenocarcinoma, stage IV with malignant pleural effusion, lung primary  PET/CT (Sept 2022) with FDG avid mass in the left lower lung likely corresponding to the newly diagnosed adenocarcinoma. FDG avid nodule in the lingula, satellite lesion versus metastasis. FDG avid nodular opacities in the right middle and right lower lobes; differential includes infectious and metastatic disease. FDG avid pleural thickening in the left lower lung, suspicious for metastasis. Multiple FDG avid mediastinal, and bilateral hilar lymph nodes compatible with metastases. FDG avid bilateral supraclavicular lymph nodes suspicious for additional metastatic lesions. The left supraclavicular lymph node is amenable to ultrasound-guided FNA biopsy as indicated. Scattered mildly FDG avid osseous foci in the axial skeleton and right proximal femur, concerning for metastases. FDG avid low-attenuation lesion within the enlarged left thyroid lobe.   In terms of her symptoms, both patient and  endorsed that her symptoms of dyspnea did improve after the thoracentesis. Currently she feels well. She does have an decreased appetite and ongoing GI issues which have improved with PPI.  11/11/22: Pt here for follow up. She has no cough but she does have increased nausea and dizziness. She saw Dr. Street for symtom management. Dizziness mainly precedes dizziness preceding her bowel movements. Has some pain in her lower abdomen. Has 2-3 BMs per day, soft in consistency. She also notes increased bloating and fatigue. Appetite seems to be a little bettr and she has gained some weight. She admits to feeling depressed most of the time but she is not suicidal.  12/16/22: No issues today, Patient reports she feels better than her prior visit. She states she does not feel short of breath on exertion. No fevers, chills or cough. Patient reports some nausea with her osimertinib, otherwise no vomiting or diarrhea. She has persistent fatigue mid day. Rash on LE and mild ankle swelling  1/25/23: Overall improved with regards to all symptoms. No AEs from osi  2/22/23: Patient seen today for follow up. She continues on Tagrisso 80mg daily. She reports ongoing bilateral lower extremity swelling that she feels is from prednisone. Rash has improved. She also is leaning towards her right side because she has an issue with her toe and is going to physical therapy for this.  3/24/23: pt has LE swelling and rash over it. Pt has been taking prednisone 2.5 mg, which was started by her PCP for joint pains.  5/23/23: Follow up. Continues on Tagrisso 80mg qd wtihout any AEs. Awaiting results of CT scans. Has some bitter taste in her mouth and some red spots on her legs.  7/25/23: Taking Tagrisso with minimal AEs. Most signficant side effect is dysgeusia and anorexia. She is taking mirtazipine, which has helped with appetite but not taste  10/3/23: Pt has been having diarrhea for a few weeks. She did not bring this to our attention, but has been following up with Dr. Rosario who emailed me regarding this. The pt states BM is watery and sometimes wakes her at night. Prednisone was recently discontinued, and LE swelling has improved. We had provided detailed instructions on diarrhea management over the phone, none of which pt has followed.  10/17/23: Plan: Pt hospitalized for diarrhea and dehydration. CT Abd / Pelvis 10/5- Fluid-filled small and large bowel without discrete transition point to suggest small bowel obstruction. Findings may represent a nonspecific enterocolitis. C DIff negative. Pt was seen by GI. Diarrhea improved, and pt started taking Tagrisso at 40 mg daily. She still has loose BM, but soft, not watery and only 3-4 times a day. She is not taking Imodium or adhering to any diet. She is here today for follow up. Notes more energy.  10/31/23: Diarrhea is resolved and patient's appetite has improved. She denies nausea, abdominal pain. She continues on tagrisso 40 mg PO qd. She last had Xgeva 120 mg on 10/5/23.  11/30/23: Patient reporting increased fatigue/weakness over the past month. Denies difficulty falling asleep. Does not wake through the night. Denies N/V. Reports abdominal pain - describes as a band-like sensation moving from epigastric area to the back.  Appetite has improved. Diarrhea has resolved.  1/11/24: IV fluids have been helping her immensely. She has been vomiting when she has an epigastric pain. She has the pain frequently. Tylenol helps minimally but she still throws up afterwards. She changed the timing of her pantoprazole from AM to PM because it was making her vomit in the morning before breakfast. Also reported occasionally food gets stuck in the upper part of her throat due to dehydration.  2/12/24: was recently admitted wit dyspnea. Noted to have b/l pleural effusion. Had thoracentesis b/l with some relief of symptoms for a brief period, but she has recurrence of dyspnea again. Last thoracentesis was last week. Fluid from rt side c/w malignancy. NGS similar to prior with 2 rare EGFR mutations. Pt notes that edema is better.  3/15/24: Feels much better. Appetite better. LE edema persistent and stable. No increased dyspnea. pt states she has fatigue especially when standing up from lying down for a while. Pain in the waist area has resolved. Some stomach discomfort and occasional headache and dizziness. She has had not had a fall. She has early satiety, nausea, and occasional vomiting. Pt has had some personal issues related to her siblings, and this has increased depression. These issues have resolved, and this has improved her condition per her family. Cath in place draining 400 ml, down to 300s over the past week.   5/17/24: Feels stable. LE improved. Lost some weight, appetite is not great! She also has bouts of vomiting.  [de-identified] : Adenocarcinoma [de-identified] : Positive for TTF-1, SUSANNAH-EP4 (focal weak and MOC-31 (focal weak) while negative for CK 5/6 and D2-40. [de-identified] : EGFR mutated, exon 18 and 20 muts   [Treatment Protocol] : Treatment Protocol [FreeTextEntry1] : Tagrisso 80mg daily started 9/23/2022. Xgeva monthly.

## 2024-05-17 NOTE — ASSESSMENT
[FreeTextEntry1] : 83 year-old female with med hx of RA (currently on hydroxycholorquine), osteoporosis (on prolia), dyslipidemia, started experiencing GI issues since COVID infection in May 2022. Went to PCP who referred her to ER for rt UQ pain- with concern of GB issues. CT scan at that time showed suspicious findings in the lung. Additional work up including CT chest and PET/CT done in September 2022 showed FDG avid mass in the left lower lung likely corresponding to the newly diagnosed adenocarcinoma. FDG avid nodule in the lingula, satellite lesion versus metastasis. FDG avid nodular opacities in the right middle and right lower lobes; differential includes infectious and metastatic disease. FDG avid pleural thickening in the left lower lung, suspicious for metastasis. Multiple FDG avid mediastinal, and bilateral hilar lymph nodes compatible with metastases. FDG avid bilateral supraclavicular lymph nodes suspicious for additional metastatic lesions.   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 specifically, S768I, [id number] Patient was started on Tagrisso - see FLGINNYRA and [facility] Trial. She was started on Tagrisso at 80 mg in September 2022, to which she has a sustained near-CR. Due to increased diarrhea, changed to 40mg daily in September 2023. There was concern for adrenal insufficiency work up ordered, but cortisol levels were normal.  Increased fatigue and weakness may be i/s/o poor PO intake. Hence, was started on IV fluids Q week along with MVI, which pt was benefiting from.  However, recent events leading up to thoracentesis noted. Pleural fluid pos for malignancy. NGS was similar to prior. This does not r/o some unknown resistance mechanism, but pt is not a candidate for post-osi SOC which is chemo with Rybrevant.   Alternate option given rare muts, is afatinib which pt is unlikely to tolerate given her intolerance to osi which has a better toxicity profile.  Pt was amenable to increased dose of osi to 80 mg. She is tolerating well.  Pt has burning sensation in LE Pt has pleurx now and has been draining about 350-400 ml every other day Continue supportive care with metoclopramide 10mg q6h PRN nausea.  MRI brain done in Feb 2024 showed there are multiple new cortical-based sub-centimeter T2 FLAIR hyperintensities present in the bilateral frontal lobes and left parietal lobe without definite enhancement.  MRI repeat in April 2024 no evidence of disease progression, without new lesions or enlargement. CT chest April 2026: Since 1/29/2024: Decreased left small pleural effusion, post left chest tube. Left lower lobe collapse/compressive atelectasis, unchanged. Improved aeration to lingula. Unchanged moderate-to-large right pleural effusion with unchanged right middle lobe collapse/atelectasis. Previously described pleural thickening obscured. Multilobar atelectasis limits evaluation for pulmonary nodules, including poor evaluation of the known left lower lobe lesion which is not well seen on this study. Unchanged sclerotic bone metastases. Continue Xgeva for bone Mets Aggressive symptoms as per  WIll refer for PT/OT OV in 3 weeks Labs from yesterday noted. Creatinine 1.56 CEA declined from 110 (2/9) to 58 (4/12/24)

## 2024-06-06 PROBLEM — R26.9 GAIT ABNORMALITY: Status: ACTIVE | Noted: 2024-01-01

## 2024-06-06 PROBLEM — R60.9 EDEMA, UNSPECIFIED TYPE: Status: ACTIVE | Noted: 2024-01-01

## 2024-06-10 PROBLEM — R60.0 LOWER EXTREMITY EDEMA: Status: ACTIVE | Noted: 2023-01-01

## 2024-06-10 PROBLEM — C34.92 PRIMARY ADENOCARCINOMA OF LEFT LUNG: Status: ACTIVE | Noted: 2022-09-02

## 2024-06-10 NOTE — ED ADULT NURSE NOTE - OBJECTIVE STATEMENT
Pt received to rm 10 presents for hydration, sent in by MD due to low BP. Pt currently normotensive, a&ox3, ambulatory with assistance at baseline, skin intact, respirations even and unlabored, abd soft and non-distended, nontender to palpation. Pt dneies chest pain, fever, chills, sob, n/v, dizziness or any other symptoms. pmhx of lung cancer not currently on chemo, arrives with left pleurvax in place-site around insertion intact. LE edema in b/l legs at baseline. 22G IV placed in left forearm, labs drawn and sent, will medicate as per ordered.

## 2024-06-10 NOTE — ED ADULT NURSE NOTE - NSFALLRISKINTERV_ED_ALL_ED

## 2024-06-10 NOTE — ED PROVIDER NOTE - OBJECTIVE STATEMENT
83-year-old female, past medical history of lung cancer (not on chemo), bilateral malignant pleural effusions with left sided Pleurx catheter, and prior DVT/PE on Eliquis, sent to ED by pulmonologist, Dr. Chiu, due to hypotension in the office.  Patient was noted to have blood pressure of 74/50 with subsequent check 94/50 in office.  Per patient, has been more lethargic/weak in the last few weeks, has also had persistent diarrhea and 5 pound weight loss. Patient also endorses decreased apetite. Patient sent in by MD for IV hydration and evaluation. Patient denies f/c, CP, SOB, abd pain, lightheadedness/dizziness, weakness/numbness, urinary sx, n/v/d, or any other symptoms.

## 2024-06-10 NOTE — ASSESSMENT
[FreeTextEntry1] : -  Mrs. 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 She was recently experiencing increased depression + anxiety in the setting of her current illness. She remains on mirtazapine with improvement in her insomnia and appetite. She was also started on low dose escitalopram 5 mg daily. She continues to take Tagrisso 80 mg daily for her NSCLC. Also on Xgeva for bone mets. She continues to drain PleurX MWF about 350-400 mL. She has a visiting nurse who drains her PleurX. She reports decreased appetite and increased vomiting. Vomiting increased after starting escitalopram. She has had increased weakness recently and inability to stand for prolonged duration. She eats about 3x/day, but small quantities. She has significant reflux for which she treats with pantoprazole and famotidine. She also has significant diarrhea for the last 2 weeks. She has lost about 5 lbs in the last few weeks.   CT Chest (April 2024) with decreased left small pleural effusion. LLL collapse/compressive atelectasis, unchanged. Improved aeration to lingula. Unchanged moderate-large right pleural effusion with unchanged RML collapse/atelectasis. Multilobar atelectasis limits evaluation for pulmonary nodules, including poor evaluation of the known left lower lobe lesion which is not well seen on this study. Interstitial edema. Lung groundglass opacities, which may be attributed to mild pulmonary edema. Trachea patent. Unchanged occlusion of the distal segmental left lower lobe bronchi. No thoracic lymphadenopathy. Unchanged sclerotic bone mets to spine, scapula, ribs.  Recent labs in April/May 2024 with stable CKD (Cr 1.59, previously 1.66), normal transaminases (borderline high alk phos 123), and improved CEA (58, previously up to 110 in Feb 2024). CBC with stable anemia (HGB 9.8) and thrombocytopenia (129).  ASSESSMENT: Hypotension likely due to dehydration and decreased oral intake Stage IV lung adenocarcinoma (EGFR positive, exon 18 & 20) Metastases to lungs, bilateral pleura, bone, and brain Bilateral malignant pleural effusions s/p Left PleurX History of pulmonary embolism  PLAN: - Recommend to go to the ED for IVF hydration - Discussed with ED team, they will try to obtain 2D-echo and LE Duplex in addition to IVF hydration - Discontinue escitalopram - Ok to continue with mirtazapine - Check CBC with diff, CMP, Magnesium, and CEA levels today - Check 2D-echo if unable to be performed in ED - She remains on Tagrisso 80 mg daily and Xgeva for bone mets - Recommend to take loperamide 2 mg up to 4x/day as needed for diarrhea - Continue drainage of left tunneled indwelling pleural catheter on Monday/Wednesday/Friday - Reported drainage is 350-400 mL each time - Right sided effusion is moderate to large and possibly contributing to recent worsening of symptoms, including weakness, feeling of heaviness, and n/v. Will discuss with IP regarding possible drainage in hospital this week, or I may have to drain in office later this week - She is declining PleurX catheter on right at this time, but IP will try to discuss with her again - Hold apixaban for now until thoracentesis - Continue pantoprazole and famotidine for GERD - Continue antifungal/steroid cream for yeast infection under left breast - Discussed with patient and , and son Richardson by phone

## 2024-06-10 NOTE — ED ADULT TRIAGE NOTE - CHIEF COMPLAINT QUOTE
Patient sent by MD for hypotension during office visit today. Patient asymptomatic, denies chest pain, SOB, dizziness, weakness. No signs of distress. Oral temp not reading in triage. Phx lung CA (not on chemo) with pleurX drain to L side, HTN, HLD, RA, PE on eliquis

## 2024-06-10 NOTE — ED PROVIDER NOTE - CLINICAL SUMMARY MEDICAL DECISION MAKING FREE TEXT BOX
83-year-old female, past medical history of lung cancer (not on chemo), bilateral malignant pleural effusions with left sided Pleurx catheter, and prior DVT/PE on Eliquis, sent to ED by pulmonologist, Dr. Chiu, due to hypotension in the office. Patient has apparently had poor appetite and episodes of diarrhea recently. Concern for possible dehydration versus failure to thrive. Sent by MD for IV hydration and eval. VSS (BP wnl). Physical exam significant for cachetic patient, BLE edema (chronic), B/l decreased breath sounds in bases (c/w known b/l pleural effusions), and  Lt pleurx catheter site appears to be C/D/I. Likely transient hypotension 2/2 dehydration. Low suspicion for infectious etiology at this time. Plan to check basic labs/electrolytes and CXR. Will provide gentle hydration. Reassess for dispo.

## 2024-06-10 NOTE — ED PROVIDER NOTE - NSFOLLOWUPINSTRUCTIONS_ED_ALL_ED_FT
You were seen in the emergency department for low blood pressure, likely from dehydration. Your workup in the emergency department includes bloodwork and xray of chest. You can find the results of all the tests in this discharge packet. Please follow up with your primary care doctor within 48 hours for continuation of care.     Return to the emergency department if you experience any new/concerning/worsening symptoms such as but not limited to: fever (>100.3F), intractable nausea, vomiting, chest pain, shortness of breath, abdominal pain.

## 2024-06-10 NOTE — REVIEW OF SYSTEMS
[Fatigue] : fatigue [Poor Appetite] : poor appetite [Recent Wt Loss (___ Lbs)] : ~T recent [unfilled] lb weight loss [GERD] : gerd [Depression] : depression [Anxiety] : anxiety [Negative] : Endocrine [TextBox_94] : weakness

## 2024-06-10 NOTE — ED PROVIDER NOTE - PROGRESS NOTE DETAILS
Tejeda PGY3  Bloodwork significant for mild CARYN, patient received IV fluids. Pending CXR and reassessment. Nikhil PGY3  Patient reports feeling better after IV hydration and would like to go home at this time. Return precautions reviewed and recommended PMD/onco follow up.

## 2024-06-10 NOTE — PROCEDURE
[Thoracic Ultrasound] : Thoracic Ultrasound [Pleural Effusion] : Pleural Effusion: Yes [Simple] : simple [de-identified] : left pleural effusion [FreeTextEntry2] : Small to moderate left simple-appearing pleural effusion with PleurX catheter in place Moderate-large right pleural effusion, simple-appearing

## 2024-06-10 NOTE — ED PROVIDER NOTE - NS ED ROS FT
Gen: Denies fever; +weight loss, +poor appetite, +lethargy  HEENT: Denies vision changes, ear pain, epistaxis, sore throat  CV: Denies chest pain, palpitations  Skin: Denies rash, erythema, color changes  Resp: Denies SOB, cough  Endo: Denies sensitivity to heat, cold, increased urination  GI: Denies abdominal pain, constipation, nausea, vomiting, diarrhea  Msk: Denies back pain, LE swelling, extremity pain  : Denies dysuria, increased frequency  Neuro: Denies LOC, weakness, numbness, tingling  Psych: Denies hx of psych, hallucinations  ROS statement: all other ROS negative except as per HPI

## 2024-06-10 NOTE — HISTORY OF PRESENT ILLNESS
[TextBox_4] : Mrs. 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 She was recently experiencing increased depression + anxiety in the setting of her current illness. She remains on mirtazapine with improvement in her insomnia and appetite. She was also started on low dose escitalopram 5 mg daily. She continues to take Tagrisso 80 mg daily for her NSCLC. Also on Xgeva for bone mets. She continues to drain PleurX MWF about 350-400 mL. She has a visiting nurse who drains her PleurX. She reports decreased appetite and increased vomiting. Vomiting increased after starting escitalopram. She has had increased weakness recently and inability to stand for prolonged duration. She eats about 3x/day, but small quantities. She has significant reflux for which she treats with pantoprazole and famotidine. She also has significant diarrhea for the last 2 weeks. She has lost about 5 lbs in the last few weeks.   CT Chest (April 2024) with decreased left small pleural effusion. LLL collapse/compressive atelectasis, unchanged. Improved aeration to lingula. Unchanged moderate-large right pleural effusion with unchanged RML collapse/atelectasis. Multilobar atelectasis limits evaluation for pulmonary nodules, including poor evaluation of the known left lower lobe lesion which is not well seen on this study. Interstitial edema. Lung groundglass opacities, which may be attributed to mild pulmonary edema. Trachea patent. Unchanged occlusion of the distal segmental left lower lobe bronchi. No thoracic lymphadenopathy. Unchanged sclerotic bone mets to spine, scapula, ribs.  Recent labs in April/May 2024 with stable CKD (Cr 1.59, previously 1.66), normal transaminases (borderline high alk phos 123), and improved CEA (58, previously up to 110 in Feb 2024). CBC with stable anemia (HGB 9.8) and thrombocytopenia (129).

## 2024-06-10 NOTE — ED PROVIDER NOTE - PATIENT PORTAL LINK FT
You can access the FollowMyHealth Patient Portal offered by [facility] by registering at the following website: [email or web address]. By joining [employer]’s FollowMyHealth portal, you will also be able to view your health information using other applications (apps) compatible with our system.

## 2024-06-10 NOTE — ED PROVIDER NOTE - PHYSICAL EXAMINATION
PHYSICAL EXAM:  GENERAL: cachetic appearing; in no respiratory distress  HEENT: Atraumatic, Normocephalic, PERRL, EOMs intact b/l w/out deficits, no conjunctival pallor, DMM  NECK: No JVD; FROM  CHEST/LUNG: decreased breath sounds in b/l bases (c/w known pleural effusions); Lt pleurx catheter site appears to be C/D/I  HEART: RRR no murmur/gallops/rubs  ABDOMEN: +BS, soft, NT, ND  EXTREMITIES: 2+ BLE pitting edema (chronic), +2 radial pulses b/l, +2 DP/PT pulses b/l  MUSCULOSKELETAL: FROM of all 4 extremities  NERVOUS SYSTEM:  A&Ox3, No motor deficits or sensory deficits; no focal neurologic deficits  SKIN:  No new rashes

## 2024-06-10 NOTE — ED PROVIDER NOTE - DISCHARGE DATE
Problem: Adult Inpatient Plan of Care  Goal: Plan of Care Review  Outcome: Ongoing (interventions implemented as appropriate)  Patient tolerated rest of infusion without any reaction (see nurses note).  No questions or concerns at this time.  Ambulated off unit in NAD.      
Problem: Nausea and Vomiting (Chemotherapy Effects)  Goal: Fluid and Electrolyte Balance  Outcome: Ongoing (interventions implemented as appropriate)  Patient here for C1D1 of Avastin.  Assessment completed and labs reviewed.  VSS.  No needs at this time.  Answered questions as needed.  Chair reclined and blanket offered.  Will continue to monitor.      
[id number]

## 2024-06-10 NOTE — PHYSICAL EXAM
[No Acute Distress] : no acute distress [Normal Oropharynx] : normal oropharynx [Normal Appearance] : normal appearance [Supple] : supple [No Neck Mass] : no neck mass [No JVD] : no jvd [Normal Rate/Rhythm] : normal rate/rhythm [Normal Pulses] : normal pulses [Normal S1, S2] : normal s1, s2 [No Murmurs] : no murmurs [No Resp Distress] : no resp distress [No Acc Muscle Use] : no acc muscle use [No Abnormalities] : no abnormalities [Benign] : benign [Soft] : soft [Normal Gait] : normal gait [No Clubbing] : no clubbing [No Cyanosis] : no cyanosis [No Edema] : no edema [FROM] : FROM [Normal Color/ Pigmentation] : normal color/ pigmentation [No Focal Deficits] : no focal deficits [Cranial Nerves Intact] : cranial nerves intact [No Motor Deficits] : no motor deficits [Oriented x3] : oriented x3 [Normal Affect] : normal affect [Not Tender] : not tender [TextBox_2] : cachectic, frail-appearing [TextBox_54] : bilateral ankle edema [TextBox_68] : decreased breath sounds at the lung bases bilaterally

## 2024-06-11 NOTE — ASU PATIENT PROFILE, ADULT - FALL HARM RISK - HARM RISK INTERVENTIONS

## 2024-06-12 NOTE — ASU DISCHARGE PLAN (ADULT/PEDIATRIC) - ASU DC SPECIAL INSTRUCTIONSFT
In case of sudden chest pain or trouble breathing, please go to your nearest emergency room and contact your pulmonologist.

## 2024-06-12 NOTE — ASU PREOP CHECKLIST - 2.
pt has a catheter to the left lateral chest covered with a 4x4 gauze dsg and tegaderm in place. Underneath b/l breast pt has a kacie rash. Dr. Parikh notified and aware pt has a catheter to the left lateral chest covered with a 4x4 gauze dsg and tegaderm in place. Underneath b/l breast pt has a kacie skin. Dr. Parikh notified and aware

## 2024-06-12 NOTE — H&P PST ADULT - ASSESSMENT
83y F w/ known malignant L pleural effusion and indwelling PleurX, presenting for assessment of R pleural effusion.

## 2024-06-12 NOTE — ASU DISCHARGE PLAN (ADULT/PEDIATRIC) - CARE PROVIDER_API CALL
Noe Chiu  Pulmonary Disease  [address] [place] [address], [facility] [address]  [place] [address]  Phone: [phone]  Fax: [phone]  Established Patient  Follow Up Time: 2 weeks

## 2024-06-12 NOTE — ASU PREOP CHECKLIST - 1.
pt has swelling to b/l lower extremities. pt had acebandges on both lower legs. pt has a bruise to her left hand. b/l feet (bottom) redden dry skin

## 2024-06-12 NOTE — ASU DISCHARGE PLAN (ADULT/PEDIATRIC) - NS MD DC FALL RISK RISK
For information on Fall & Injury Prevention, visit: [email or web address] OR  [email or web address] OR  https://www.cdc.gov/steadi/patient.html

## 2024-06-12 NOTE — ASU DISCHARGE PLAN (ADULT/PEDIATRIC) - NPI NUMBER (FOR SYSADMIN USE ONLY) :
Reason for Call:  Medication or medication refill:    Do you use a [facility]?  Name of the pharmacy and phone number for the current request:  Not sure    Name of the medication requested: metoprolol (TOPROL-XL) 25 MG 24 hr tablet    Other request:     Can we leave a detailed message on this number? YES    Phone number patient can be reached at: Home number on file [phone] (home)    Best Time:     Call taken on 6/20/2019 at 10:24 AM by JACQUELYN BURKS       [[phone]]

## 2024-06-12 NOTE — ASU DISCHARGE PLAN (ADULT/PEDIATRIC) - CARE PROVIDER_API CALL
Noe Chiu  Pulmonary Disease  [address] [facility], [facility] [address]  [place] [address]  Phone: [phone]  Fax: [phone]  Follow Up Time: 7-10 Days

## 2024-06-12 NOTE — H&P PST ADULT - HISTORY OF PRESENT ILLNESS
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

## 2024-06-12 NOTE — ASU DISCHARGE PLAN (ADULT/PEDIATRIC) - FOLLOW UP APPOINTMENTS
may also call Recovery Room (PACU) 24/7 @ [phone]/[facility]

## 2024-06-12 NOTE — ASU DISCHARGE PLAN (ADULT/PEDIATRIC) - ASU DC SPECIAL INSTRUCTIONSFT
In case of sudden chest pain or trouble breathing, please go to your nearest emergency room and contact your pulmonologist.     Please do not take your Eliquis until tomorrow morning

## 2024-06-13 NOTE — PHYSICAL EXAM
[Ambulatory and capable of all self care but unable to carry out any work activities] : Status 2- Ambulatory and capable of all self care but unable to carry out any work activities. Up and about more than 50% of waking hours [Thin] : thin [Normal] : affect appropriate [de-identified] : decreased BS rt>left [de-identified] : b/l LE edema 2+ but improved compared with last visit

## 2024-06-13 NOTE — HISTORY OF PRESENT ILLNESS
[Disease: _____________________] : Disease: [unfilled] [T: ___] : T[unfilled] [N: ___] : N[unfilled] [M: ___] : M[unfilled] [AJCC Stage: ____] : AJCC Stage: [unfilled] [de-identified] : 83 year-old female with med hx of RA (Enbrel and hydroxycholorquine), osteoporosis (on prolia), dyslipidemia, started experiencing GI issues since COVID infection in May 2022. Went to PCP who referred her to ER for rt UQ pain- with concern of GB issues. CT scan at that time showed suspicious findings in the lung. Additional work up as below:  CT chest A/P: LLL mass with mets to right lung, left pleura, bone (thoracic spine & R proximal femur), malignant left pleural effusion s/p thoracentesis (9/1/22), PE was also noted. She had recent EBUS/TBNA and thoracentesis on 9/1/22. Pathology with non-small cell lung adenocarcinoma, stage IV with malignant pleural effusion, lung primary  PET/CT (Sept 2022) with FDG avid mass in the left lower lung likely corresponding to the newly diagnosed adenocarcinoma. FDG avid nodule in the lingula, satellite lesion versus metastasis. FDG avid nodular opacities in the right middle and right lower lobes; differential includes infectious and metastatic disease. FDG avid pleural thickening in the left lower lung, suspicious for metastasis. Multiple FDG avid mediastinal, and bilateral hilar lymph nodes compatible with metastases. FDG avid bilateral supraclavicular lymph nodes suspicious for additional metastatic lesions. The left supraclavicular lymph node is amenable to ultrasound-guided FNA biopsy as indicated. Scattered mildly FDG avid osseous foci in the axial skeleton and right proximal femur, concerning for metastases. FDG avid low-attenuation lesion within the enlarged left thyroid lobe.   In terms of her symptoms, both patient and  endorsed that her symptoms of dyspnea did improve after the thoracentesis. Currently she feels well. She does have an decreased appetite and ongoing GI issues which have improved with PPI.  11/11/22: Pt here for follow up. She has no cough but she does have increased nausea and dizziness. She saw Dr. Street for symtom management. Dizziness mainly precedes dizziness preceding her bowel movements. Has some pain in her lower abdomen. Has 2-3 BMs per day, soft in consistency. She also notes increased bloating and fatigue. Appetite seems to be a little bettr and she has gained some weight. She admits to feeling depressed most of the time but she is not suicidal.  12/16/22: No issues today, Patient reports she feels better than her prior visit. She states she does not feel short of breath on exertion. No fevers, chills or cough. Patient reports some nausea with her osimertinib, otherwise no vomiting or diarrhea. She has persistent fatigue mid day. Rash on LE and mild ankle swelling  1/25/23: Overall improved with regards to all symptoms. No AEs from osi  2/22/23: Patient seen today for follow up. She continues on Tagrisso 80mg daily. She reports ongoing bilateral lower extremity swelling that she feels is from prednisone. Rash has improved. She also is leaning towards her right side because she has an issue with her toe and is going to physical therapy for this.  3/24/23: pt has LE swelling and rash over it. Pt has been taking prednisone 2.5 mg, which was started by her PCP for joint pains.  5/23/23: Follow up. Continues on Tagrisso 80mg qd wtihout any AEs. Awaiting results of CT scans. Has some bitter taste in her mouth and some red spots on her legs.  7/25/23: Taking Tagrisso with minimal AEs. Most signficant side effect is dysgeusia and anorexia. She is taking mirtazipine, which has helped with appetite but not taste  10/3/23: Pt has been having diarrhea for a few weeks. She did not bring this to our attention, but has been following up with Dr. Rosario who emailed me regarding this. The pt states BM is watery and sometimes wakes her at night. Prednisone was recently discontinued, and LE swelling has improved. We had provided detailed instructions on diarrhea management over the phone, none of which pt has followed.  10/17/23: Plan: Pt hospitalized for diarrhea and dehydration. CT Abd / Pelvis 10/5- Fluid-filled small and large bowel without discrete transition point to suggest small bowel obstruction. Findings may represent a nonspecific enterocolitis. C DIff negative. Pt was seen by GI. Diarrhea improved, and pt started taking Tagrisso at 40 mg daily. She still has loose BM, but soft, not watery and only 3-4 times a day. She is not taking Imodium or adhering to any diet. She is here today for follow up. Notes more energy.  10/31/23: Diarrhea is resolved and patient's appetite has improved. She denies nausea, abdominal pain. She continues on tagrisso 40 mg PO qd. She last had Xgeva 120 mg on 10/5/23.  11/30/23: Patient reporting increased fatigue/weakness over the past month. Denies difficulty falling asleep. Does not wake through the night. Denies N/V. Reports abdominal pain - describes as a band-like sensation moving from epigastric area to the back.  Appetite has improved. Diarrhea has resolved.  1/11/24: IV fluids have been helping her immensely. She has been vomiting when she has an epigastric pain. She has the pain frequently. Tylenol helps minimally but she still throws up afterwards. She changed the timing of her pantoprazole from AM to PM because it was making her vomit in the morning before breakfast. Also reported occasionally food gets stuck in the upper part of her throat due to dehydration.  2/12/24: was recently admitted wit dyspnea. Noted to have b/l pleural effusion. Had thoracentesis b/l with some relief of symptoms for a brief period, but she has recurrence of dyspnea again. Last thoracentesis was last week. Fluid from rt side c/w malignancy. NGS similar to prior with 2 rare EGFR mutations. Pt notes that edema is better.  3/15/24: Feels much better. Appetite better. LE edema persistent and stable. No increased dyspnea. pt states she has fatigue especially when standing up from lying down for a while. Pain in the waist area has resolved. Some stomach discomfort and occasional headache and dizziness. She has had not had a fall. She has early satiety, nausea, and occasional vomiting. Pt has had some personal issues related to her siblings, and this has increased depression. These issues have resolved, and this has improved her condition per her family. Cath in place draining 400 ml, down to 300s over the past week.   5/17/24: Feels stable. LE improved. Lost some weight, appetite is not great! She also has bouts of vomiting.   6/13/24: Pt was seen in pulmonary 2 days ago, was noted to be lethargic and dehydrated- sent to ER for IV hydration. She also had thoracentesis 800 cc from rt side. She continues to drasin 350-400 TIW on left. Feels a little better after the above.  [de-identified] : Adenocarcinoma [de-identified] : Positive for TTF-1, SUSANNAH-EP4 (focal weak and MOC-31 (focal weak) while negative for CK 5/6 and D2-40. [de-identified] : EGFR mutated, exon 18 and 20 muts   [Treatment Protocol] : Treatment Protocol [FreeTextEntry1] : Tagrisso 80mg daily started 9/23/2022. Xgeva monthly.

## 2024-06-13 NOTE — ASSESSMENT
[FreeTextEntry1] : 83 year-old female with med hx of RA (currently on hydroxycholorquine), osteoporosis (on prolia), dyslipidemia, started experiencing GI issues since COVID infection in May 2022. Went to PCP who referred her to ER for rt UQ pain- with concern of GB issues. CT scan at that time showed suspicious findings in the lung. Additional work up including CT chest and PET/CT done in September 2022 showed FDG avid mass in the left lower lung likely corresponding to the newly diagnosed adenocarcinoma. FDG avid nodule in the lingula, satellite lesion versus metastasis. FDG avid nodular opacities in the right middle and right lower lobes; differential includes infectious and metastatic disease. FDG avid pleural thickening in the left lower lung, suspicious for metastasis. Multiple FDG avid mediastinal, and bilateral hilar lymph nodes compatible with metastases. FDG avid bilateral supraclavicular lymph nodes suspicious for additional metastatic lesions.  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 specifically, S768I, G719S Patient was started on Tagrisso - see FLGINNYRA and [facility] Trial. She was started on Tagrisso at 80 mg in September 2022, to which she has a sustained near-CR. Due to increased diarrhea, changed to 40mg daily in September 2023. There was concern for adrenal insufficiency work up ordered, but cortisol levels were normal.  Increased fatigue and weakness may be i/s/o poor PO intake. Hence, was started on IV fluids Q week along with MVI, which pt was benefiting from.  However, recent events leading up to thoracentesis noted. Pleural fluid pos for malignancy. NGS was similar to prior. This does not r/o some unknown resistance mechanism, but pt is not a candidate for post-osi SOC which is chemo with Rybrevant.   Alternate option given rare muts, is afatinib which pt is unlikely to tolerate given her intolerance to osi which has a better toxicity profile.  Pt was amenable to increased dose of osi to 80 mg. She is tolerating well.  Pt has burning sensation in LE Pt has pleurx now and has been draining about 350-400 ml every other day Continue supportive care with metoclopramide 10mg q6h PRN nausea.  MRI brain done in Feb 2024 showed there are multiple new cortical-based sub-centimeter T2 FLAIR hyperintensities present in the bilateral frontal lobes and left parietal lobe without definite enhancement.  MRI repeat in April 2024 no evidence of disease progression, without new lesions or enlargement. CT chest April 2026: Since 1/29/2024: Decreased left small pleural effusion, post left chest tube. Left lower lobe collapse/compressive atelectasis, unchanged. Improved aeration to lingula. Unchanged moderate-to-large right pleural effusion with unchanged right middle lobe collapse/atelectasis. Previously described pleural thickening obscured. Multilobar atelectasis limits evaluation for pulmonary nodules, including poor evaluation of the known left lower lobe lesion which is not well seen on this study. Unchanged sclerotic bone metastases. Continue Xgeva for bone Mets Aggressive symptoms as per  WIll refer for PT/OT   6/13/24 events as above pt feels like she benefitted from hydration in the ER. Requests to go back on IVF with MVI which we will cautiously restart A/w echo and doppler ordered by Dr. Chiu  Diarrhea- continue BRAT diet, imodium Q 4 hours as needed Nausea- Continue Zofran Follow up with pulm Extensive discussions regarding GOC, symptom management, depression discussed Pt has appt with psych. Recently started on escitalopram PT/OT per PMR OV in 1 month

## 2024-06-13 NOTE — REVIEW OF SYSTEMS
[Fever] : no fever [Chills] : no chills [Fatigue] : fatigue [Dysphagia] : dysphagia [Odynophagia] : no odynophagia [Lower Ext Edema] : no lower extremity edema [Shortness Of Breath] : shortness of breath [Vomiting] : no vomiting [Constipation] : no constipation [Diarrhea: Grade 2 - Increase of 4 - 6 stools per day over baseline; moderate increase in ostomy output compared to baseline] : Diarrhea: Grade 2 - Increase of 4 - 6 stools per day over baseline; moderate increase in ostomy output compared to baseline [Muscle Weakness] : muscle weakness [Negative] : Allergic/Immunologic [FreeTextEntry2] : appetite improved [FreeTextEntry7] : eatery diarrhea 2-3 times a day [de-identified] : Resolves with loperamide

## 2024-06-20 PROBLEM — F32.A DEPRESSION: Status: ACTIVE | Noted: 2022-10-21

## 2024-06-20 PROBLEM — R63.0 DECREASE IN APPETITE: Status: ACTIVE | Noted: 2022-10-21

## 2024-06-20 PROBLEM — K52.1 DIARRHEA DUE TO DRUG: Status: ACTIVE | Noted: 2023-01-01

## 2024-06-20 PROBLEM — D64.9 ANEMIA: Status: ACTIVE | Noted: 2023-05-09

## 2024-06-20 PROBLEM — I10 HTN (HYPERTENSION): Status: ACTIVE | Noted: 2022-10-21

## 2024-06-20 PROBLEM — R11.0 NAUSEA: Status: ACTIVE | Noted: 2022-10-28

## 2024-06-20 PROBLEM — G62.0 CHEMOTHERAPY-INDUCED PERIPHERAL NEUROPATHY: Status: ACTIVE | Noted: 2024-01-01

## 2024-06-20 PROBLEM — C34.92 ADENOCARCINOMA OF LEFT LUNG, STAGE 4: Status: ACTIVE | Noted: 2022-09-16

## 2024-06-20 PROBLEM — M06.9 RHEUMATOID ARTHRITIS: Status: ACTIVE | Noted: 2022-09-06

## 2024-06-20 PROBLEM — I26.99 PULMONARY EMBOLISM: Status: ACTIVE | Noted: 2022-09-23

## 2024-06-20 PROBLEM — Z51.5 ENCOUNTER FOR PALLIATIVE CARE: Status: ACTIVE | Noted: 2022-11-02

## 2024-06-20 PROBLEM — J90 BILATERAL PLEURAL EFFUSION: Status: ACTIVE | Noted: 2024-01-01

## 2024-06-20 NOTE — HISTORY OF PRESENT ILLNESS
[FreeTextEntry1] : 82 yo female with met lung ca with mets to lungs and bony mets. Pt with diarrhea daily, on tagrisso.  Not eating, has weight loss. Pt at 86 pounds now, baseline 110# but two montsh ago was 96#.  Pt saw psychiatry yesterday, pt on mirtazapine and [facility] added paroxetine 10 mg a day.  Pt meds reviewed with pharmacy team.  Family believes loss of appetite is related to psychiatric issues. Pt receiving support for this and family issues.   Pt has declined functionally, now unable to walk, too weak. No falls  Pt on Eliquis for DVT Discussed stopping atorvastatin.

## 2024-06-20 NOTE — REVIEW OF SYSTEMS
[Feeling Poorly] : feeling poorly [Feeling Tired] : feeling tired [Recent Weight Loss (___ Lbs)] : recent [unfilled] ~Ulb weight loss [Diarrhea] : diarrhea [As Noted in HPI] : as noted in HPI [Anxiety] : anxiety [Depression] : depression [Negative] : Heme/Lymph [FreeTextEntry6] : has pleurix catheter in place, 300 cc drainage daily [FreeTextEntry9] : pain in legs, burning skin

## 2024-06-20 NOTE — PHYSICAL EXAM
[General Appearance - Alert] : alert [Sclera] : the sclera and conjunctiva were normal [PERRL With Normal Accommodation] : pupils were equal in size, round, and reactive to light [Extraocular Movements] : extraocular movements were intact [Normal Oral Mucosa] : normal oral mucosa [No Oral Pallor] : no oral pallor [No Oral Cyanosis] : no oral cyanosis [Outer Ear] : the ears and nose were normal in appearance [Oropharynx] : The oropharynx was normal [Neck Appearance] : the appearance of the neck was normal [Neck Cervical Mass (___cm)] : no neck mass was observed [Jugular Venous Distention Increased] : there was no jugular-venous distention [Thyroid Diffuse Enlargement] : the thyroid was not enlarged [Thyroid Nodule] : there were no palpable thyroid nodules [Heart Rate And Rhythm] : heart rate was normal and rhythm regular [Heart Sounds] : normal S1 and S2 [Heart Sounds Gallop] : no gallops [Murmurs] : no murmurs [Heart Sounds Pericardial Friction Rub] : no pericardial rub [Full Pulse] : the pedal pulses are present [Edema] : there was no peripheral edema [Bowel Sounds] : normal bowel sounds [Abdomen Soft] : soft [Abdomen Tenderness] : non-tender [Abdomen Mass (___ Cm)] : no abdominal mass palpated [Skin Color & Pigmentation] : normal skin color and pigmentation [Skin Turgor] : normal skin turgor [] : no rash [Deep Tendon Reflexes (DTR)] : deep tendon reflexes were 2+ and symmetric [Sensation] : the sensory exam was normal to light touch and pinprick [No Focal Deficits] : no focal deficits [Oriented To Time, Place, And Person] : oriented to person, place, and time [Affect] : the affect was normal [Over the Past 2 Weeks, Have You Felt Down, Depressed, or Hopeless?] : 1.) Over the past 2 weeks, have you felt down, depressed, or hopeless? Yes [Over the Past 2 Weeks, Have You Felt Little Interest or Pleasure Doing Things?] : 2.) Over the past 2 weeks, have you felt little interest or pleasure doing things? Yes [FreeTextEntry1] : weak

## 2024-06-20 NOTE — ASSESSMENT
[FreeTextEntry1] : 84 yo female with metastatic lung cancer, frailty, complicated by diarrhea and weak. Pt with code status full, and pt uncomfortable and declining further, tagrisso likely the cause of diarrhea.  1) Lung ca - tagrisso , diarrhea - recommend tagrisso holiday for two weeks. Pt agreeable. 2) Frailty - stop tagrisso, ensure, discussed need for aide in home, would qualify for hospice services - SW referral, Called Dr. Chiu and son, Richardson, and Dr. Persadu - left message for richardson. Plan to stop tagrisso for two weeks. 3) SW referral - family needs an aide to help with care at home 4) Prognosis - pt at risk for fall, infection, respiratory distress, dehydration. 5) Discussed hospice benefit  Discussed hospitalization - pt does not want to go to hospital.   inquiring about help at home, sw referral provided.   Metastatic disease, plural fluid and drainage, weak, weight loss. Pt at risk for falling and on eliquis , will prepare son for decisions that may be needed.  Nutrition , frailty secondary to metastatic disease.

## 2024-06-21 NOTE — HISTORY OF PRESENT ILLNESS
[Disease: _____________________] : Disease: [unfilled] [T: ___] : T[unfilled] [N: ___] : N[unfilled] [M: ___] : M[unfilled] [AJCC Stage: ____] : AJCC Stage: [unfilled] [Treatment Protocol] : Treatment Protocol [de-identified] : 83 year-old female with med hx of RA (Enbrel and hydroxycholorquine), osteoporosis (on prolia), dyslipidemia, started experiencing GI issues since COVID infection in May 2022. Went to PCP who referred her to ER for rt UQ pain- with concern of GB issues. CT scan at that time showed suspicious findings in the lung. Additional work up as below:  CT chest A/P: LLL mass with mets to right lung, left pleura, bone (thoracic spine & R proximal femur), malignant left pleural effusion s/p thoracentesis (9/1/22), PE was also noted. She had recent EBUS/TBNA and thoracentesis on 9/1/22. Pathology with non-small cell lung adenocarcinoma, stage IV with malignant pleural effusion, lung primary  PET/CT (Sept 2022) with FDG avid mass in the left lower lung likely corresponding to the newly diagnosed adenocarcinoma. FDG avid nodule in the lingula, satellite lesion versus metastasis. FDG avid nodular opacities in the right middle and right lower lobes; differential includes infectious and metastatic disease. FDG avid pleural thickening in the left lower lung, suspicious for metastasis. Multiple FDG avid mediastinal, and bilateral hilar lymph nodes compatible with metastases. FDG avid bilateral supraclavicular lymph nodes suspicious for additional metastatic lesions. The left supraclavicular lymph node is amenable to ultrasound-guided FNA biopsy as indicated. Scattered mildly FDG avid osseous foci in the axial skeleton and right proximal femur, concerning for metastases. FDG avid low-attenuation lesion within the enlarged left thyroid lobe.   In terms of her symptoms, both patient and  endorsed that her symptoms of dyspnea did improve after the thoracentesis. Currently she feels well. She does have an decreased appetite and ongoing GI issues which have improved with PPI.  11/11/22: Pt here for follow up. She has no cough but she does have increased nausea and dizziness. She saw Dr. Street for symtom management. Dizziness mainly precedes dizziness preceding her bowel movements. Has some pain in her lower abdomen. Has 2-3 BMs per day, soft in consistency. She also notes increased bloating and fatigue. Appetite seems to be a little bettr and she has gained some weight. She admits to feeling depressed most of the time but she is not suicidal.  12/16/22: No issues today, Patient reports she feels better than her prior visit. She states she does not feel short of breath on exertion. No fevers, chills or cough. Patient reports some nausea with her osimertinib, otherwise no vomiting or diarrhea. She has persistent fatigue mid day. Rash on LE and mild ankle swelling  1/25/23: Overall improved with regards to all symptoms. No AEs from osi  2/22/23: Patient seen today for follow up. She continues on Tagrisso 80mg daily. She reports ongoing bilateral lower extremity swelling that she feels is from prednisone. Rash has improved. She also is leaning towards her right side because she has an issue with her toe and is going to physical therapy for this.  3/24/23: pt has LE swelling and rash over it. Pt has been taking prednisone 2.5 mg, which was started by her PCP for joint pains.  5/23/23: Follow up. Continues on Tagrisso 80mg qd wtihout any AEs. Awaiting results of CT scans. Has some bitter taste in her mouth and some red spots on her legs.  7/25/23: Taking Tagrisso with minimal AEs. Most signficant side effect is dysgeusia and anorexia. She is taking mirtazipine, which has helped with appetite but not taste  10/3/23: Pt has been having diarrhea for a few weeks. She did not bring this to our attention, but has been following up with Dr. Rosario who emailed me regarding this. The pt states BM is watery and sometimes wakes her at night. Prednisone was recently discontinued, and LE swelling has improved. We had provided detailed instructions on diarrhea management over the phone, none of which pt has followed.  10/17/23: Plan: Pt hospitalized for diarrhea and dehydration. CT Abd / Pelvis 10/5- Fluid-filled small and large bowel without discrete transition point to suggest small bowel obstruction. Findings may represent a nonspecific enterocolitis. C DIff negative. Pt was seen by GI. Diarrhea improved, and pt started taking Tagrisso at 40 mg daily. She still has loose BM, but soft, not watery and only 3-4 times a day. She is not taking Imodium or adhering to any diet. She is here today for follow up. Notes more energy.  10/31/23: Diarrhea is resolved and patient's appetite has improved. She denies nausea, abdominal pain. She continues on tagrisso 40 mg PO qd. She last had Xgeva 120 mg on 10/5/23.  11/30/23: Patient reporting increased fatigue/weakness over the past month. Denies difficulty falling asleep. Does not wake through the night. Denies N/V. Reports abdominal pain - describes as a band-like sensation moving from epigastric area to the back.  Appetite has improved. Diarrhea has resolved.  1/11/24: IV fluids have been helping her immensely. She has been vomiting when she has an epigastric pain. She has the pain frequently. Tylenol helps minimally but she still throws up afterwards. She changed the timing of her pantoprazole from AM to PM because it was making her vomit in the morning before breakfast. Also reported occasionally food gets stuck in the upper part of her throat due to dehydration.  2/12/24: was recently admitted wit dyspnea. Noted to have b/l pleural effusion. Had thoracentesis b/l with some relief of symptoms for a brief period, but she has recurrence of dyspnea again. Last thoracentesis was last week. Fluid from rt side c/w malignancy. NGS similar to prior with 2 rare EGFR mutations. Pt notes that edema is better.  3/15/24: Feels much better. Appetite better. LE edema persistent and stable. No increased dyspnea. pt states she has fatigue especially when standing up from lying down for a while. Pain in the waist area has resolved. Some stomach discomfort and occasional headache and dizziness. She has had not had a fall. She has early satiety, nausea, and occasional vomiting. Pt has had some personal issues related to her siblings, and this has increased depression. These issues have resolved, and this has improved her condition per her family. Cath in place draining 400 ml, down to 300s over the past week.   5/17/24: Feels stable. LE improved. Lost some weight, appetite is not great! She also has bouts of vomiting.   6/13/24: Pt was seen in pulmonary 2 days ago, was noted to be lethargic and dehydrated- sent to ER for IV hydration. She also had thoracentesis 800 cc from rt side. She continues to drasin 350-400 TIW on left. Feels a little better after the above.  [de-identified] : Adenocarcinoma [de-identified] : Positive for TTF-1, SUSANNAH-EP4 (focal weak and MOC-31 (focal weak) while negative for CK 5/6 and D2-40. 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 Xgeva monthly.

## 2024-06-21 NOTE — ASSESSMENT
[FreeTextEntry1] : 84 yo with lung ca, chemotherapy neuropathy, cancer related fatigu e # CIPN  - mostly presents with numbness, can consider Duloxetine but would like to hold off  -would  benefit from a rollator to improve balance and maximize independence  #Cancer related fatigue  - PT for gait and balance and strengthening - goal to increase STS testing from 3 on this visit to 6   # continue follow up with Palliative Care for symptom management    I spent a total of 60 minutes on this encounter including documentation, face to face time, care coordination and reviewing prior records from surgical, radiation and medical oncology.

## 2024-06-21 NOTE — PHYSICAL EXAM
[FreeTextEntry1] : General Appearance: no acut distress  Skin: Inspection of the skin reveals no rashes or ulcerations. HEENT: The sclerae were anicteric and conjunctivae were pink and moist.  Chest: Normal chest expansion. Abdomen: Soft and non-tender with normal bowel sounds. Musculoskeletal: There is NROM b/l UE/LE  Extremities: No cyanosis, clubbing or edema. Neurologic: Cranial nerves are intact The patient has normal muscle strength in bilateral upper and lower extremities. No expressive or receptive aphasia. Sensation is intact. Gait is steady. No Babinski, Shook or clonus.

## 2024-07-11 ENCOUNTER — APPOINTMENT (OUTPATIENT)
Dept: INFUSION THERAPY | Facility: HOSPITAL | Age: 84
End: 2024-07-11

## 2024-07-11 LAB — NIGHT BLUE STAIN TISS: ABNORMAL

## 2024-07-18 ENCOUNTER — APPOINTMENT (OUTPATIENT)
Dept: PHYSICAL MEDICINE AND REHAB | Facility: CLINIC | Age: 84
End: 2024-07-18

## 2024-07-19 ENCOUNTER — APPOINTMENT (OUTPATIENT)
Dept: HEMATOLOGY ONCOLOGY | Facility: CLINIC | Age: 84
End: 2024-07-19

## 2024-07-24 LAB
CULTURE RESULTS: ABNORMAL
SPECIMEN SOURCE: SIGNIFICANT CHANGE UP

## 2024-07-25 ENCOUNTER — APPOINTMENT (OUTPATIENT)
Dept: GERIATRICS | Facility: CLINIC | Age: 84
End: 2024-07-25

## 2024-08-08 ENCOUNTER — APPOINTMENT (OUTPATIENT)
Dept: INFUSION THERAPY | Facility: HOSPITAL | Age: 84
End: 2024-08-08

## 2024-09-05 ENCOUNTER — APPOINTMENT (OUTPATIENT)
Dept: INFUSION THERAPY | Facility: HOSPITAL | Age: 84
End: 2024-09-05

## 2024-09-14 NOTE — ED ADULT NURSE NOTE - PAIN RATING/NUMBER SCALE (0-10): ACTIVITY
[EENT/Resp Symptoms] : EENT/RESPIRATORY SYMPTOMS [Runny nose] : runny nose [Nasal congestion] : nasal congestion [Sore Throat] : sore throat [___ Day(s)] : [unfilled] day(s) [Constant] : constant [Ibuprofen] : ibuprofen [Fever] : fever [Nasal Congestion] : nasal congestion [Cough] : cough [Max Temp: ____] : Max temperature: [unfilled] [Stable] : stable [Sick Contacts: ___] : no sick contacts [Decreased Appetite] : no decreased appetite [Decreased Urine Output] : no decreased urine output [FreeTextEntry9] : one episode of vomiting 6
